# Patient Record
Sex: FEMALE | Race: BLACK OR AFRICAN AMERICAN | Employment: FULL TIME | URBAN - METROPOLITAN AREA
[De-identification: names, ages, dates, MRNs, and addresses within clinical notes are randomized per-mention and may not be internally consistent; named-entity substitution may affect disease eponyms.]

---

## 2017-03-29 ENCOUNTER — TELEPHONE (OUTPATIENT)
Dept: FAMILY MEDICINE CLINIC | Age: 62
End: 2017-03-29

## 2023-03-27 ENCOUNTER — TELEPHONE (OUTPATIENT)
Dept: PRIMARY CARE | Facility: CLINIC | Age: 68
End: 2023-03-27

## 2023-11-28 ENCOUNTER — APPOINTMENT (OUTPATIENT)
Dept: PRIMARY CARE | Facility: CLINIC | Age: 68
End: 2023-11-28
Payer: COMMERCIAL

## 2023-12-30 ENCOUNTER — APPOINTMENT (OUTPATIENT)
Dept: CARDIOLOGY | Facility: HOSPITAL | Age: 68
DRG: 065 | End: 2023-12-30
Payer: MEDICARE

## 2023-12-30 ENCOUNTER — APPOINTMENT (OUTPATIENT)
Dept: RADIOLOGY | Facility: HOSPITAL | Age: 68
DRG: 065 | End: 2023-12-30
Payer: MEDICARE

## 2023-12-30 ENCOUNTER — HOSPITAL ENCOUNTER (INPATIENT)
Facility: HOSPITAL | Age: 68
LOS: 4 days | Discharge: NURSING, REHAB OR LONG-TERM CARE | DRG: 065 | End: 2024-01-03
Attending: STUDENT IN AN ORGANIZED HEALTH CARE EDUCATION/TRAINING PROGRAM | Admitting: INTERNAL MEDICINE
Payer: MEDICARE

## 2023-12-30 DIAGNOSIS — I48.11 LONGSTANDING PERSISTENT ATRIAL FIBRILLATION (MULTI): ICD-10-CM

## 2023-12-30 DIAGNOSIS — I63.9 CEREBROVASCULAR ACCIDENT (CVA), UNSPECIFIED MECHANISM (MULTI): Primary | ICD-10-CM

## 2023-12-30 DIAGNOSIS — I67.89 OTHER CEREBROVASCULAR DISEASE: ICD-10-CM

## 2023-12-30 DIAGNOSIS — G45.8 OTHER SPECIFIED TRANSIENT CEREBRAL ISCHEMIAS: ICD-10-CM

## 2023-12-30 PROBLEM — G62.9 PERIPHERAL NEUROPATHY: Status: ACTIVE | Noted: 2023-12-30

## 2023-12-30 PROBLEM — L89.159 PRESSURE INJURY OF SKIN OF SACRAL REGION: Status: ACTIVE | Noted: 2023-12-30

## 2023-12-30 PROBLEM — E03.9 HYPOTHYROIDISM: Status: ACTIVE | Noted: 2022-12-09

## 2023-12-30 PROBLEM — I10 ESSENTIAL HYPERTENSION: Status: ACTIVE | Noted: 2017-12-21

## 2023-12-30 PROBLEM — F41.9 ANXIETY: Status: ACTIVE | Noted: 2023-12-30

## 2023-12-30 PROBLEM — K21.9 GERD (GASTROESOPHAGEAL REFLUX DISEASE): Status: ACTIVE | Noted: 2023-03-24

## 2023-12-30 LAB
ABO GROUP (TYPE) IN BLOOD: NORMAL
ALBUMIN SERPL BCP-MCNC: 3.6 G/DL (ref 3.4–5)
ALP SERPL-CCNC: 98 U/L (ref 33–136)
ALT SERPL W P-5'-P-CCNC: 28 U/L (ref 7–45)
ANION GAP BLDV CALCULATED.4IONS-SCNC: 9 MMOL/L (ref 10–25)
ANION GAP SERPL CALC-SCNC: 10 MMOL/L (ref 10–20)
ANTIBODY SCREEN: NORMAL
AORTIC VALVE PEAK VELOCITY: 1.57
APTT PPP: 31 SECONDS (ref 27–38)
AST SERPL W P-5'-P-CCNC: 24 U/L (ref 9–39)
AV PEAK GRADIENT: 9.9
BASE EXCESS BLDV CALC-SCNC: 1.7 MMOL/L (ref -2–3)
BASOPHILS # BLD AUTO: 0.05 X10*3/UL (ref 0–0.1)
BASOPHILS NFR BLD AUTO: 0.5 %
BILIRUB DIRECT SERPL-MCNC: 0.2 MG/DL (ref 0–0.3)
BILIRUB SERPL-MCNC: 0.5 MG/DL (ref 0–1.2)
BNP SERPL-MCNC: 302 PG/ML (ref 0–99)
BODY TEMPERATURE: 37 DEGREES CELSIUS
BUN SERPL-MCNC: 26 MG/DL (ref 6–23)
CA-I BLDV-SCNC: 1.17 MMOL/L (ref 1.1–1.33)
CALCIUM SERPL-MCNC: 8.5 MG/DL (ref 8.6–10.3)
CARDIAC TROPONIN I PNL SERPL HS: 23 NG/L (ref 0–13)
CHLORIDE BLDV-SCNC: 103 MMOL/L (ref 98–107)
CHLORIDE SERPL-SCNC: 105 MMOL/L (ref 98–107)
CHOLEST SERPL-MCNC: 153 MG/DL (ref 0–199)
CHOLESTEROL/HDL RATIO: 1.9
CO2 SERPL-SCNC: 26 MMOL/L (ref 21–32)
CREAT SERPL-MCNC: 0.83 MG/DL (ref 0.5–1.05)
EJECTION FRACTION APICAL 4 CHAMBER: 58.2
EJECTION FRACTION: 58
EOSINOPHIL # BLD AUTO: 0.05 X10*3/UL (ref 0–0.7)
EOSINOPHIL NFR BLD AUTO: 0.5 %
ERYTHROCYTE [DISTWIDTH] IN BLOOD BY AUTOMATED COUNT: 16.2 % (ref 11.5–14.5)
EST. AVERAGE GLUCOSE BLD GHB EST-MCNC: 91 MG/DL
FLUAV RNA RESP QL NAA+PROBE: NOT DETECTED
FLUBV RNA RESP QL NAA+PROBE: NOT DETECTED
GFR SERPL CREATININE-BSD FRML MDRD: 76 ML/MIN/1.73M*2
GLUCOSE BLD MANUAL STRIP-MCNC: 139 MG/DL (ref 74–99)
GLUCOSE BLDV-MCNC: 129 MG/DL (ref 74–99)
GLUCOSE SERPL-MCNC: 121 MG/DL (ref 74–99)
HBA1C MFR BLD: 4.8 %
HCO3 BLDV-SCNC: 26.6 MMOL/L (ref 22–26)
HCT VFR BLD AUTO: 34.1 % (ref 36–46)
HCT VFR BLD EST: 36 % (ref 36–46)
HDLC SERPL-MCNC: 79.3 MG/DL
HGB BLD-MCNC: 11.1 G/DL (ref 12–16)
HGB BLDV-MCNC: 12 G/DL (ref 12–16)
IMM GRANULOCYTES # BLD AUTO: 0.03 X10*3/UL (ref 0–0.7)
IMM GRANULOCYTES NFR BLD AUTO: 0.3 % (ref 0–0.9)
INHALED O2 CONCENTRATION: 21 %
INR PPP: 1 (ref 0.9–1.1)
LACTATE BLDV-SCNC: 1.1 MMOL/L (ref 0.4–2)
LACTATE SERPL-SCNC: 1 MMOL/L (ref 0.4–2)
LDLC SERPL CALC-MCNC: 62 MG/DL
LEFT VENTRICLE INTERNAL DIMENSION DIASTOLE: 4.4 (ref 3.5–6)
LYMPHOCYTES # BLD AUTO: 1.38 X10*3/UL (ref 1.2–4.8)
LYMPHOCYTES NFR BLD AUTO: 15.1 %
MCH RBC QN AUTO: 29.1 PG (ref 26–34)
MCHC RBC AUTO-ENTMCNC: 32.6 G/DL (ref 32–36)
MCV RBC AUTO: 89 FL (ref 80–100)
MITRAL VALVE E/A RATIO: 1.25
MITRAL VALVE E/E' RATIO: 6.6
MONOCYTES # BLD AUTO: 0.7 X10*3/UL (ref 0.1–1)
MONOCYTES NFR BLD AUTO: 7.7 %
NEUTROPHILS # BLD AUTO: 6.94 X10*3/UL (ref 1.2–7.7)
NEUTROPHILS NFR BLD AUTO: 75.9 %
NON HDL CHOLESTEROL: 74 MG/DL (ref 0–149)
NRBC BLD-RTO: 0 /100 WBCS (ref 0–0)
OXYHGB MFR BLDV: 36.5 % (ref 45–75)
PCO2 BLDV: 42 MM HG (ref 41–51)
PH BLDV: 7.41 PH (ref 7.33–7.43)
PLATELET # BLD AUTO: 178 X10*3/UL (ref 150–450)
PO2 BLDV: 33 MM HG (ref 35–45)
POTASSIUM BLDV-SCNC: 3.4 MMOL/L (ref 3.5–5.3)
POTASSIUM SERPL-SCNC: 3.3 MMOL/L (ref 3.5–5.3)
PROT SERPL-MCNC: 6 G/DL (ref 6.4–8.2)
PROTHROMBIN TIME: 10.8 SECONDS (ref 9.8–12.8)
RBC # BLD AUTO: 3.82 X10*6/UL (ref 4–5.2)
RH FACTOR (ANTIGEN D): NORMAL
RIGHT VENTRICLE FREE WALL PEAK S': 19.1
RIGHT VENTRICLE PEAK SYSTOLIC PRESSURE: 17.6
SAO2 % BLDV: 38 % (ref 45–75)
SARS-COV-2 RNA RESP QL NAA+PROBE: NOT DETECTED
SODIUM BLDV-SCNC: 135 MMOL/L (ref 136–145)
SODIUM SERPL-SCNC: 138 MMOL/L (ref 136–145)
TRICUSPID ANNULAR PLANE SYSTOLIC EXCURSION: 2.4
TRIGL SERPL-MCNC: 59 MG/DL (ref 0–149)
VLDL: 12 MG/DL (ref 0–40)
WBC # BLD AUTO: 9.2 X10*3/UL (ref 4.4–11.3)

## 2023-12-30 PROCEDURE — 71045 X-RAY EXAM CHEST 1 VIEW: CPT | Mod: FR

## 2023-12-30 PROCEDURE — 84132 ASSAY OF SERUM POTASSIUM: CPT | Performed by: STUDENT IN AN ORGANIZED HEALTH CARE EDUCATION/TRAINING PROGRAM

## 2023-12-30 PROCEDURE — 96374 THER/PROPH/DIAG INJ IV PUSH: CPT | Mod: 59

## 2023-12-30 PROCEDURE — 85025 COMPLETE CBC W/AUTO DIFF WBC: CPT | Performed by: STUDENT IN AN ORGANIZED HEALTH CARE EDUCATION/TRAINING PROGRAM

## 2023-12-30 PROCEDURE — 99223 1ST HOSP IP/OBS HIGH 75: CPT | Performed by: INTERNAL MEDICINE

## 2023-12-30 PROCEDURE — 70450 CT HEAD/BRAIN W/O DYE: CPT

## 2023-12-30 PROCEDURE — 36415 COLL VENOUS BLD VENIPUNCTURE: CPT | Performed by: STUDENT IN AN ORGANIZED HEALTH CARE EDUCATION/TRAINING PROGRAM

## 2023-12-30 PROCEDURE — 93308 TTE F-UP OR LMTD: CPT

## 2023-12-30 PROCEDURE — 2500000004 HC RX 250 GENERAL PHARMACY W/ HCPCS (ALT 636 FOR OP/ED): Performed by: STUDENT IN AN ORGANIZED HEALTH CARE EDUCATION/TRAINING PROGRAM

## 2023-12-30 PROCEDURE — 71045 X-RAY EXAM CHEST 1 VIEW: CPT | Mod: FOREIGN READ | Performed by: RADIOLOGY

## 2023-12-30 PROCEDURE — 83036 HEMOGLOBIN GLYCOSYLATED A1C: CPT | Mod: AHULAB | Performed by: INTERNAL MEDICINE

## 2023-12-30 PROCEDURE — 92523 SPEECH SOUND LANG COMPREHEN: CPT | Mod: GN | Performed by: SPEECH-LANGUAGE PATHOLOGIST

## 2023-12-30 PROCEDURE — 93308 TTE F-UP OR LMTD: CPT | Performed by: INTERNAL MEDICINE

## 2023-12-30 PROCEDURE — 99222 1ST HOSP IP/OBS MODERATE 55: CPT | Performed by: STUDENT IN AN ORGANIZED HEALTH CARE EDUCATION/TRAINING PROGRAM

## 2023-12-30 PROCEDURE — 82248 BILIRUBIN DIRECT: CPT | Performed by: STUDENT IN AN ORGANIZED HEALTH CARE EDUCATION/TRAINING PROGRAM

## 2023-12-30 PROCEDURE — 82947 ASSAY GLUCOSE BLOOD QUANT: CPT

## 2023-12-30 PROCEDURE — 86901 BLOOD TYPING SEROLOGIC RH(D): CPT | Performed by: STUDENT IN AN ORGANIZED HEALTH CARE EDUCATION/TRAINING PROGRAM

## 2023-12-30 PROCEDURE — 85730 THROMBOPLASTIN TIME PARTIAL: CPT | Performed by: STUDENT IN AN ORGANIZED HEALTH CARE EDUCATION/TRAINING PROGRAM

## 2023-12-30 PROCEDURE — 70551 MRI BRAIN STEM W/O DYE: CPT

## 2023-12-30 PROCEDURE — 36415 COLL VENOUS BLD VENIPUNCTURE: CPT | Performed by: INTERNAL MEDICINE

## 2023-12-30 PROCEDURE — 70498 CT ANGIOGRAPHY NECK: CPT

## 2023-12-30 PROCEDURE — 83605 ASSAY OF LACTIC ACID: CPT | Performed by: STUDENT IN AN ORGANIZED HEALTH CARE EDUCATION/TRAINING PROGRAM

## 2023-12-30 PROCEDURE — 92610 EVALUATE SWALLOWING FUNCTION: CPT | Mod: GN | Performed by: SPEECH-LANGUAGE PATHOLOGIST

## 2023-12-30 PROCEDURE — 70551 MRI BRAIN STEM W/O DYE: CPT | Performed by: RADIOLOGY

## 2023-12-30 PROCEDURE — 87636 SARSCOV2 & INF A&B AMP PRB: CPT | Performed by: STUDENT IN AN ORGANIZED HEALTH CARE EDUCATION/TRAINING PROGRAM

## 2023-12-30 PROCEDURE — 96372 THER/PROPH/DIAG INJ SC/IM: CPT | Performed by: INTERNAL MEDICINE

## 2023-12-30 PROCEDURE — 70450 CT HEAD/BRAIN W/O DYE: CPT | Performed by: RADIOLOGY

## 2023-12-30 PROCEDURE — 80061 LIPID PANEL: CPT | Performed by: INTERNAL MEDICINE

## 2023-12-30 PROCEDURE — 2550000001 HC RX 255 CONTRASTS: Performed by: STUDENT IN AN ORGANIZED HEALTH CARE EDUCATION/TRAINING PROGRAM

## 2023-12-30 PROCEDURE — 1200000002 HC GENERAL ROOM WITH TELEMETRY DAILY

## 2023-12-30 PROCEDURE — 2500000004 HC RX 250 GENERAL PHARMACY W/ HCPCS (ALT 636 FOR OP/ED): Performed by: INTERNAL MEDICINE

## 2023-12-30 PROCEDURE — 85610 PROTHROMBIN TIME: CPT | Performed by: STUDENT IN AN ORGANIZED HEALTH CARE EDUCATION/TRAINING PROGRAM

## 2023-12-30 PROCEDURE — 83880 ASSAY OF NATRIURETIC PEPTIDE: CPT | Performed by: INTERNAL MEDICINE

## 2023-12-30 PROCEDURE — 84484 ASSAY OF TROPONIN QUANT: CPT | Performed by: STUDENT IN AN ORGANIZED HEALTH CARE EDUCATION/TRAINING PROGRAM

## 2023-12-30 PROCEDURE — 99291 CRITICAL CARE FIRST HOUR: CPT | Mod: 25 | Performed by: STUDENT IN AN ORGANIZED HEALTH CARE EDUCATION/TRAINING PROGRAM

## 2023-12-30 PROCEDURE — 93005 ELECTROCARDIOGRAM TRACING: CPT

## 2023-12-30 RX ORDER — CARVEDILOL 6.25 MG/1
6.25 TABLET ORAL 2 TIMES DAILY
Status: DISCONTINUED | OUTPATIENT
Start: 2023-12-30 | End: 2024-01-03 | Stop reason: HOSPADM

## 2023-12-30 RX ORDER — HYDRALAZINE HYDROCHLORIDE 20 MG/ML
10 INJECTION INTRAMUSCULAR; INTRAVENOUS ONCE
Status: COMPLETED | OUTPATIENT
Start: 2023-12-30 | End: 2023-12-30

## 2023-12-30 RX ORDER — LABETALOL HYDROCHLORIDE 5 MG/ML
20 INJECTION, SOLUTION INTRAVENOUS ONCE
Status: DISCONTINUED | OUTPATIENT
Start: 2023-12-30 | End: 2023-12-30

## 2023-12-30 RX ORDER — LABETALOL HYDROCHLORIDE 5 MG/ML
10 INJECTION, SOLUTION INTRAVENOUS EVERY 10 MIN PRN
Status: DISCONTINUED | OUTPATIENT
Start: 2023-12-30 | End: 2023-12-30

## 2023-12-30 RX ORDER — HYDRALAZINE HYDROCHLORIDE 25 MG/1
25 TABLET, FILM COATED ORAL EVERY 6 HOURS PRN
Status: DISCONTINUED | OUTPATIENT
Start: 2024-01-01 | End: 2024-01-03 | Stop reason: HOSPADM

## 2023-12-30 RX ORDER — LISINOPRIL 20 MG/1
40 TABLET ORAL DAILY
Status: DISCONTINUED | OUTPATIENT
Start: 2023-12-30 | End: 2024-01-03 | Stop reason: HOSPADM

## 2023-12-30 RX ORDER — PANTOPRAZOLE SODIUM 40 MG/1
40 TABLET, DELAYED RELEASE ORAL
Status: DISCONTINUED | OUTPATIENT
Start: 2023-12-31 | End: 2024-01-03 | Stop reason: HOSPADM

## 2023-12-30 RX ORDER — NAPROXEN SODIUM 220 MG/1
81 TABLET, FILM COATED ORAL DAILY
Status: DISCONTINUED | OUTPATIENT
Start: 2023-12-30 | End: 2024-01-03 | Stop reason: HOSPADM

## 2023-12-30 RX ORDER — ENOXAPARIN SODIUM 100 MG/ML
40 INJECTION SUBCUTANEOUS EVERY 24 HOURS
Status: DISCONTINUED | OUTPATIENT
Start: 2023-12-30 | End: 2024-01-03 | Stop reason: HOSPADM

## 2023-12-30 RX ORDER — POLYETHYLENE GLYCOL 3350 17 G/17G
17 POWDER, FOR SOLUTION ORAL DAILY
Status: DISCONTINUED | OUTPATIENT
Start: 2023-12-30 | End: 2024-01-03 | Stop reason: HOSPADM

## 2023-12-30 RX ORDER — ATORVASTATIN CALCIUM 80 MG/1
80 TABLET, FILM COATED ORAL NIGHTLY
Status: DISCONTINUED | OUTPATIENT
Start: 2023-12-30 | End: 2024-01-03 | Stop reason: HOSPADM

## 2023-12-30 RX ORDER — HYDRALAZINE HYDROCHLORIDE 20 MG/ML
10 INJECTION INTRAMUSCULAR; INTRAVENOUS
Status: DISPENSED | OUTPATIENT
Start: 2023-12-30 | End: 2024-01-01

## 2023-12-30 RX ORDER — LEVOTHYROXINE SODIUM 50 UG/1
25 TABLET ORAL DAILY
Status: DISCONTINUED | OUTPATIENT
Start: 2023-12-31 | End: 2024-01-03 | Stop reason: HOSPADM

## 2023-12-30 RX ORDER — METOPROLOL TARTRATE 1 MG/ML
5 INJECTION, SOLUTION INTRAVENOUS EVERY 6 HOURS PRN
Status: DISCONTINUED | OUTPATIENT
Start: 2023-12-30 | End: 2024-01-03 | Stop reason: HOSPADM

## 2023-12-30 RX ADMIN — HYDRALAZINE HYDROCHLORIDE 10 MG: 20 INJECTION INTRAMUSCULAR; INTRAVENOUS at 14:23

## 2023-12-30 RX ADMIN — HYDRALAZINE HYDROCHLORIDE 10 MG: 20 INJECTION INTRAMUSCULAR; INTRAVENOUS at 11:38

## 2023-12-30 RX ADMIN — ENOXAPARIN SODIUM 40 MG: 40 INJECTION SUBCUTANEOUS at 15:35

## 2023-12-30 RX ADMIN — LABETALOL HYDROCHLORIDE 10 MG: 5 INJECTION, SOLUTION INTRAVENOUS at 12:32

## 2023-12-30 RX ADMIN — IOHEXOL 90 ML: 350 INJECTION, SOLUTION INTRAVENOUS at 10:36

## 2023-12-30 SDOH — SOCIAL STABILITY: SOCIAL INSECURITY: WERE YOU ABLE TO COMPLETE ALL THE BEHAVIORAL HEALTH SCREENINGS?: NO

## 2023-12-30 ASSESSMENT — ACTIVITIES OF DAILY LIVING (ADL)
JUDGMENT_ADEQUATE_SAFELY_COMPLETE_DAILY_ACTIVITIES: UNABLE TO ASSESS
PATIENT'S MEMORY ADEQUATE TO SAFELY COMPLETE DAILY ACTIVITIES?: UNABLE TO ASSESS
HEARING - RIGHT EAR: UNABLE TO ASSESS
ADEQUATE_TO_COMPLETE_ADL: UNABLE TO ASSESS
FEEDING YOURSELF: DEPENDENT
HEARING - LEFT EAR: UNABLE TO ASSESS
BATHING: DEPENDENT
WALKS IN HOME: DEPENDENT
DRESSING YOURSELF: DEPENDENT
GROOMING: DEPENDENT
TOILETING: DEPENDENT

## 2023-12-30 ASSESSMENT — COGNITIVE AND FUNCTIONAL STATUS - GENERAL
MOVING TO AND FROM BED TO CHAIR: TOTAL
CLIMB 3 TO 5 STEPS WITH RAILING: TOTAL
TURNING FROM BACK TO SIDE WHILE IN FLAT BAD: TOTAL
DRESSING REGULAR UPPER BODY CLOTHING: TOTAL
STANDING UP FROM CHAIR USING ARMS: TOTAL
MOVING FROM LYING ON BACK TO SITTING ON SIDE OF FLAT BED WITH BEDRAILS: TOTAL
MOVING TO AND FROM BED TO CHAIR: TOTAL
PERSONAL GROOMING: TOTAL
HELP NEEDED FOR BATHING: TOTAL
TOILETING: TOTAL
EATING MEALS: TOTAL
DAILY ACTIVITIY SCORE: 6
DRESSING REGULAR LOWER BODY CLOTHING: TOTAL
DRESSING REGULAR LOWER BODY CLOTHING: TOTAL
WALKING IN HOSPITAL ROOM: TOTAL
TURNING FROM BACK TO SIDE WHILE IN FLAT BAD: A LOT
HELP NEEDED FOR BATHING: TOTAL
DAILY ACTIVITIY SCORE: 6
MOVING FROM LYING ON BACK TO SITTING ON SIDE OF FLAT BED WITH BEDRAILS: TOTAL
MOBILITY SCORE: 6
STANDING UP FROM CHAIR USING ARMS: TOTAL
TURNING FROM BACK TO SIDE WHILE IN FLAT BAD: TOTAL
DAILY ACTIVITIY SCORE: 6
PATIENT BASELINE BEDBOUND: UNABLE TO ASSESS AT THIS TIME
DRESSING REGULAR UPPER BODY CLOTHING: TOTAL
PERSONAL GROOMING: TOTAL
STANDING UP FROM CHAIR USING ARMS: TOTAL
PERSONAL GROOMING: TOTAL
PERSONAL GROOMING: TOTAL
TOILETING: TOTAL
EATING MEALS: TOTAL
MOBILITY SCORE: 6
DRESSING REGULAR LOWER BODY CLOTHING: TOTAL
STANDING UP FROM CHAIR USING ARMS: TOTAL
EATING MEALS: TOTAL
MOVING FROM LYING ON BACK TO SITTING ON SIDE OF FLAT BED WITH BEDRAILS: TOTAL
TOILETING: TOTAL
WALKING IN HOSPITAL ROOM: TOTAL
CLIMB 3 TO 5 STEPS WITH RAILING: TOTAL
MOVING TO AND FROM BED TO CHAIR: TOTAL
MOBILITY SCORE: 8
DRESSING REGULAR LOWER BODY CLOTHING: TOTAL
CLIMB 3 TO 5 STEPS WITH RAILING: TOTAL
DAILY ACTIVITIY SCORE: 6
MOBILITY SCORE: 6
MOVING FROM LYING ON BACK TO SITTING ON SIDE OF FLAT BED WITH BEDRAILS: A LOT
WALKING IN HOSPITAL ROOM: TOTAL
DRESSING REGULAR UPPER BODY CLOTHING: TOTAL
WALKING IN HOSPITAL ROOM: TOTAL
CLIMB 3 TO 5 STEPS WITH RAILING: TOTAL
HELP NEEDED FOR BATHING: TOTAL
EATING MEALS: TOTAL
TOILETING: TOTAL
HELP NEEDED FOR BATHING: TOTAL
TURNING FROM BACK TO SIDE WHILE IN FLAT BAD: TOTAL
MOVING TO AND FROM BED TO CHAIR: TOTAL
DRESSING REGULAR UPPER BODY CLOTHING: TOTAL

## 2023-12-30 ASSESSMENT — PAIN SCALES - GENERAL: PAINLEVEL_OUTOF10: 0 - NO PAIN

## 2023-12-30 ASSESSMENT — PAIN - FUNCTIONAL ASSESSMENT: PAIN_FUNCTIONAL_ASSESSMENT: 0-10

## 2023-12-30 NOTE — ED PROVIDER NOTES
History/Exam limitations: communication barrier aphasia .   Additional history was obtained from EMS personnel.    HPI:       68-year-old female past medical history of cerebrovascular accident presenting to the emergency department as a stroke alert.  History is provided by emergency medical services as the patient is aphasic and not able to provide any history.  Per patient's family, at 10 PM last night the patient went to bed normal.  She woke up this morning and was unresponsive, prompting EMS to be called.  When EMS arrived, they noted she had a normal blood glucose, but had left-sided weakness and was aphasic therefore brought in as a stroke alert.  Patient aphasic on arrival so unable to provide any history.  Per family, patient has been in her usual state of health prior to today.     Last Known Well Time: 12/29 22:00        ------------------------------------------------------------------------------------------------------------------------------------------     Physical Exam:    ED Triage Vitals   Temp Pulse Resp BP   -- -- -- --      SpO2 Temp src Heart Rate Source Patient Position   -- -- -- --      BP Location FiO2 (%)     -- --          Gen: Sitting in stretcher with mouth open and vomitus on shirt  Head/Neck: NCAT  Eyes: Anicteric sclerae, noninjected conjunctivae  Nose: No rhinorrhea  Mouth:  dry mm. No op lesions  Heart: Regular rate and irregular rhythm w/ no murmurs, rubs, gallops  Lungs: CTAB w/o wheezes, rales, rhonchi, no increased work of breathing  Abdomen: Soft, NT/ND  Musculoskeletal: No deformities  Extremities: No edema.  Neurologic: Please see below for NIHSS  Skin: No rashes noted  Psychological: Calm        Interval: Baseline  Time: 10:25 AM  Person Administering Scale: Boy Mccain MD     1a  Level of consciousness: 1=not alert but arousable by minor stimulation to obey, answer or respond   1b. LOC questions:  2=Performs neither task correctly   1c. LOC commands: 0=Performs both  tasks correctly   2.  Best Gaze: 2=forced deviation, or total gaze paresis not overcome by oculocephalic maneuver   3. Visual: 0=No visual loss   4. Facial Palsy: 1=Minor paralysis (flattened nasolabial fold, asymmetric on smiling)   5a. Motor left arm: 0=No drift, limb holds 90 (or 45) degrees for full 10 seconds   5b.  Motor right arm: 0=No drift, limb holds 90 (or 45) degrees for full 10 seconds   6a. motor left le=No drift, limb holds 90 (or 45) degrees for full 10 seconds   6b  Motor right le=No drift, limb holds 90 (or 45) degrees for full 10 seconds   7. Limb Ataxia: 0=Absent   8.  Sensory: 0=Normal; no sensory loss   9. Best Language:  3=Mute, global aphasia; no usable speech or auditory comprehension   10. Dysarthria: 2=Severe; patient speech is so slurred as to be unintelligible in the absence of or our of proportion to any dysphagia, or is mute/anarthric   11. Extinction and Inattention: 0=No abnormality     Total:   11         CARMELITA: CARMELITA: Positive     ------------------------------------------------------------------------------------------------------------------------------------------     Medical Decision Makin-year-old female with past medical history of CVA presents to the emergency department as a stroke alert.  Patient has been having decreased dexterity in her hands, and slight decline over the past few days.  Son found her today at home unresponsive.  She arrives as a stroke alert.  NIH initially 11 with aphasia, left gaze deviation that cannot be overcome.  However, she is protecting her airway, following commands.  Point-of-care glucose obtained on initial arrival which was normal.  IV access established, and patient taken to CT and CT angiogram due to being VAN positive.  CT head interpreted by me demonstrates no evidence of intracranial hemorrhage, and CT angiogram negative for evidence of large vessel occlusion therefore the patient was not a thrombectomy candidate.  In  discussion with radiologist, patient CT head demonstrates subacute frontoparietal infarct which is likely the cause of her symptoms at this time.  Metabolic workup otherwise unremarkable for infectious, or metabolic derangement precipitating her altered mental status.  MRI will be ordered, and patient will be admitted to the hospitalist service.  Systolic blood pressure did elevate above 220 once, so she received 1 dose of IV hydralazine.    Diagnoses as of 12/30/23 1212   Cerebrovascular accident (CVA), unspecified mechanism (CMS/HCC)        EKG interpreted by myself: Regular ventricular rate, regular rhythm, normal axis. NY, QRS, and QTC intervals within normal limits. No acute ST segment changes or T wave inversions.     Impression: Normal sinus rhythm with no acute ischemia or arrhythmia, no previous EKG seen for comparison.        Independent Interpretation of Studies: I independently interpreted the CT head and see No obvious evidence of intracranial hemorrhage    Chronic Medical Conditions Significantly Affecting Care: CVA hx    Social Determinants of Health Significantly Affecting Care: NA         IV Thrombolysis IV Thrombolysis Checklist        IV Thrombolysis Given: No; Thrombolysis contraindication reason: Time from Last Known Well (or stroke onset) is >4.5 hours         Procedure  Procedures          Boy Mccain MD  12/30/23 1214

## 2023-12-30 NOTE — PROGRESS NOTES
SLP ADULT Inpatient Speech-Language Evaluation    Patient Name: Zo Nunez  MRN: [id number]  : 1955  Today's Date: 23   Time Calculation  Start Time: 1415  Stop Time: 1440  Time Calculation (min): 25 min       Diagnosis/Recommendations: SEVERE mixed aphasia   -use model and max cues for simple 1 step commands, hand over hand assist as needed         General Information:  Patient Admitted: Stroke alert - MRI positive  Reason for Referral: Concern for aspiration, aphasic  Referred By: RN  Past Medical History Relevant to Rehab: AFib, HTN, alcoholism    Oral Peripheral Exam:    -OM: Right sided facial weakness with droop   -Dentition: Functional    -Voice/Phonation/Intensity: unable to assess, absent   -DDK: unable to assess, absent   -Intelligibility: unable to assess, non-verbal    Auditory Comprehension:   Pt  followed simple commands with hand over hand/tactile assist, otherwise unable to follow commands for OME at time of eval.    Verbal Expression:   Seemingly restless and frustated, no attempt at verbalizations or vocalizations at time of eval.  Unresponsive to simple yes/no questions, though appeared to possibly shake head minimally 'no' when asked if her name was Caroal.      Visual:  Pt with eye gaze deviation to left, unable to scan to or past midline at time of evaluation, despite SLP movement in room.  Inattention to right side at this time.  Multiple times, eyes rolled back and up to left.  Able to relax back, but consistently fixated to left.  Occasional some bilateral hand jerking, but unable to assess whether related to frustration/restlessness/neurological etiology.  MD aware    Plan:     SLP Frequency: 3x per week  Duration: Current admission  SLP Discharge Recommendations: Continue skilled speech therapy services post discharge  Discussed POC: Patient, Caregiver/family, Nursing, Physician  Discussed Risks/Benefits: Yes, Patient, Caregiver/Family, Nursing,  Physician  Patient/Caregiver Agreeable: Yes    Goals: 1.Pt will complete basic level expressive language tasks with max/model assist.  2.Pt will complete basic level receptive language tasks with max/total assist      Treatment Provided:        Inpatient Education:   Patient/family educated on role of SLP   Family education results gave verbal understanding

## 2023-12-30 NOTE — ED PROCEDURE NOTE
Procedure  Critical Care    Performed by: Boy Mccain MD  Authorized by: Boy Mccain MD    Critical care provider statement:     Critical care time (minutes):  40    Critical care time was exclusive of:  Separately billable procedures and treating other patients and teaching time    Critical care was necessary to treat or prevent imminent or life-threatening deterioration of the following conditions:  CNS failure or compromise    Critical care was time spent personally by me on the following activities:  Development of treatment plan with patient or surrogate, discussions with consultants, evaluation of patient's response to treatment, examination of patient, obtaining history from patient or surrogate, review of old charts, re-evaluation of patient's condition, ordering and review of radiographic studies, ordering and review of laboratory studies and ordering and performing treatments and interventions               Boy Mccain MD  12/30/23 5704

## 2023-12-30 NOTE — PROGRESS NOTES
Speech-Language Pathology    Inpatient Speech-Language Pathology Clinical Swallow Evaluation    Patient Name: Zo Nunez  MRN: [id number]  : 1955  Today's Date: 23  Time Calculation  Start Time: 1415  Stop Time: 1440  Time Calculation (min): 25 min       Recommendations:  NPO with consideration for alternative means nutrition/hydration.  - Frequent, aggressive oral care is strongly recommended to improve infection control to maintain dental health and reduce bacteria in oral/pharyngeal cavity which could increase the risk of pneumonia.  -continued SLP assessment for PO readiness    General Info:  Reason for Referral: Concern for aspiration, aphasic  Past Medical History Relevant to Rehab: AFib, HTN, alcoholism  Prior Level of Function: WFL  BaseLine Diet: Regular  Dysphagia Diagnosis: Severe oral stage dysphagia, Severe pharyngeal stage dysphagia    MRI:  Multiple areas acute ischemia are scattered through the left and to a  lesser degree right frontal lobe    Assessment:  Clinical swallow evaluation completed.  Significantly limited by pt sustained attention/arousal and mixed aphasia.  Pt able to follow minimal commands, though improved with tactile/max cues.  No verbalizations or vocalizations noted at time of assessment, though family did report some moaning.  rided sided facial weakness noted with droop, resulting in drooling and anterior loss of small bolus of mildly thick liquid with spoon.  DIL and son at bedside report frequent coughing/choking on own saliva in ED.   No further trials provided 2/2 lethargy and concern for high risk aspiration.  Once alert and participating, recommend reassessment of swallowing or need for MBSS.    Diet Recommendations: NPO with consideration for alternative means nutrition/hydration.             Plan:  Treatment/Interventions: Bolus trials, Assess diet tolerance, Diet recommendations, Complete MBSS  SLP Frequency: 3x per week  SLP Discharge Recommendations:  Continue skilled speech therapy services post discharge      GOALS:  Pt. to tolerate least restrictive diet without pulmonary compromise    Education:   Pt. Educated on purpose of assessment, recommended diet and recommended safe swallow strategies

## 2023-12-30 NOTE — CONSULTS
Subjective   History Of Present Illness  Zo Nunez is a 68 y.o. female admitted on 12/30/2023 and past medical history including atrial fibrillation not on anticoagulation admitted for aphasia and right-sided weakness with last known well at approximately 2200 on 12/29.  Prior to admission, she has noted a headache for the last 2 days in the morning.  Even prior to last night, she was having some concern of right-sided facial droop.    On the morning of 12/30, patient was found lying in feces and unable to communicate with left gaze deviation.    Patient's son notes poor medication adherence.    Review of systems as noted above, otherwise negative.    Past Medical History  Past Medical History:   Diagnosis Date    Acute upper respiratory infection, unspecified     Acute upper respiratory infection    Alcohol dependence, in remission (CMS/[facility]) 12/26/2019    History of alcoholism    Alcohol dependence, in remission (CMS/[facility]) 12/26/2019    History of alcohol dependence    Constipation, unspecified 12/26/2019    Constipation in female    Essential (primary) hypertension 12/26/2019    HTN (hypertension), benign    Other malaise 12/26/2019    Physical deconditioning    Personal history of other diseases of the circulatory system 12/26/2019    History of atrial fibrillation    Personal history of other diseases of the musculoskeletal system and connective tissue 12/26/2019    History of rhabdomyolysis    Personal history of other specified conditions 12/26/2019    History of weakness    Pressure ulcer of sacral region, stage 2 (CMS/[facility]) 12/26/2019    Pressure injury of sacral region, stage 2    Trichomonal vulvovaginitis     Trichomonal vulvovaginitis     Surgical History  Past Surgical History:   Procedure Laterality Date    OTHER SURGICAL HISTORY  12/26/2019    Decubitus ulcer debridement     Social History  Social History     Tobacco Use    Smoking status: Unknown   Substance Use Topics    Alcohol use: Defer    Drug  use: Defer     Allergies  Reviewed in EMR       Objective   Last Recorded Vitals  Blood pressure (!) 221/104, pulse 90, temperature 37 °C (98.6 °F), temperature source Temporal, resp. rate 20, weight 76.2 kg (167 lb 15.9 oz), SpO2 97 %.    Scheduled medications  aspirin, 81 mg, oral, Daily  atorvastatin, 80 mg, oral, Nightly  carvedilol, 6.25 mg, oral, BID  enoxaparin, 40 mg, subcutaneous, q24h  levothyroxine, 25 mcg, oral, Daily  lisinopril, 40 mg, oral, Daily  [START ON 12/31/2023] pantoprazole, 40 mg, oral, Daily before breakfast  perflutren lipid microspheres, 0.5-10 mL of dilution, intravenous, Once in imaging  perflutren protein A microsphere, 0.5 mL, intravenous, Once in imaging  polyethylene glycol, 17 g, oral, Daily  sulfur hexafluoride microsphr, 2 mL, intravenous, Once in imaging      Continuous medications     PRN medications  PRN medications: hydrALAZINE **FOLLOWED BY** [START ON 1/1/2024] hydrALAZINE, metoprolol, oxygen, sodium chloride         Exam   Neurological Exam  Mental Status  Awake and alert. Attention and concentration are normal.  Patient is otherwise mute, does not attempt to verbalize.  There is some head-nodding to questions and moderate ability to follow commands; this is felt to represent a mixed aphasia with expressive greater than receptive deficits..    Cranial Nerves  CN II: Patient blinks to threat more from the right than the left.  CN III, IV, VI: Gaze is conjugate with left deviation that does not cross midline.  Patient will briefly track to midline and then turn her head.  Pupils are equal, round, and react.  CN V: Difficult to assess based on level of cooperation, reacts bilaterally.  CN VII:  Right: There is central facial weakness.  Left: There is no facial weakness.  CN VIII: Hearing is normal.  CN IX, X: Does not cooperate.  CN XI: Shoulder shrug strength is normal.  CN XII: Tongue midline without atrophy or fasciculations.    Motor  Normal muscle bulk throughout. Normal  muscle tone. Right pronator drift and left pronator drift.  There is some drift of the bilateral upper extremities, more prominent on the right than the left, though both remain antigravity for duration of NIH testing.  Handgrip shows slight asymmetry, decreased on the right when compared to the left.  Bilateral lower extremities display good resistance..    Sensory  Patient reacts to tactile stimuli bilaterally, felt to be symmetric.    Reflexes                                            Right                      Left  Brachioradialis                    2+                         2+  Biceps                                 2+                         2+  Patellar                                1+                         1+    Coordination    Does not cooperate.    Physical Exam  Constitutional:       General: She is awake.   Neurological:      Mental Status: She is alert.      Deep Tendon Reflexes:      Reflex Scores:       Bicep reflexes are 2+ on the right side and 2+ on the left side.       Brachioradialis reflexes are 2+ on the right side and 2+ on the left side.       Patellar reflexes are 1+ on the right side and 1+ on the left side.        1a. LOC  1b. Month and Age  1c. Blink and Make fist  2 Best Gaze  3. Visual  4. Facial Palsy  5A. Motor - L Arm  5B. Motor - R Arm  6A. Motor - L Leg  6B. Motor - R Leg  7. Limb Ataxia  8. Sensory Loss  9. Best Language  10. Dysarthria  11. Extinction and Inattention   Total NIHSS 0  2  1  2  2  2  1  1  0  0  0  0  3  2  0  16             Relevant Results    Lab Results  Results from last 72 hours   Lab Units 12/30/23  1034   GLUCOSE mg/dL 121*   SODIUM mmol/L 138   POTASSIUM mmol/L 3.3*   CHLORIDE mmol/L 105   CO2 mmol/L 26   ANION GAP mmol/L 10   BUN mg/dL 26*   CREATININE mg/dL 0.83   EGFR mL/min/1.73m*2 76   CALCIUM mg/dL 8.5*   ALBUMIN g/dL 3.6      Results from last 72 hours   Lab Units 12/30/23  1034   WBC AUTO x10*3/uL 9.2   NRBC AUTO /100 WBCs 0.0   RBC AUTO  x10*6/uL 3.82*   HEMOGLOBIN g/dL 11.1*   HEMATOCRIT % 34.1*   MCV fL 89   MCH pg 29.1   MCHC g/dL 32.6   RDW % 16.2*   PLATELETS AUTO x10*3/uL 178        MR brain wo IV contrast   Final Result   Multiple areas acute ischemia are scattered through the left and to a   lesser degree right frontal lobe as noted above.        The head of the right caudate nucleus in the posterior aspect of the   right corona radiata have punctate old hemosiderin/mineral   depositions. No acute hemorrhage is noted.        The parenchymal hyperintensities elsewhere are nonspecific and may be   secondary to chronic microvascular ischemic change.        MACRO:   None        Signed by: Vishnu Husain 12/30/2023 2:04 PM   Dictation workstation:   [id number]      XR chest 1 view   Final Result   No acute cardiopulmonary disease.  No significant interval change from   the prior exam..   Signed by Larry James MD      CT angio brain attack neck w IV contrast and post procedure   Final Result   * Minor atherosclerotic changes in the cavernous and supraclinoid   portion of the internal carotid arteries. Without measurable luminal   narrowing. No evidence of intracranial aneurysm, vascular   malformation or branch occlusion.        MACRO:   none        Signed by: Sudeep Livingston 12/30/2023 10:44 AM   Dictation workstation:   [id number]      CT brain attack head wo IV contrast   Final Result   1. Subacute nonhemorrhagic infarct left posterior frontal parietal   region not seen on 03/22/2023.   2. Remote left thalamic infarct.        MACRO:   Arlin Washington discussed the significance and urgency of this   critical finding by telephone with  Dr. Velasquez on 12/30/2023 at   10:13 am.  (**-RCF-**) Findings:  See findings.        Signed by: Arlin Washington 12/30/2023 10:18 AM   Dictation workstation:   [id number]      Transthoracic Echo (TTE) Limited    (Results Pending)         Assessment/Plan     #Acute ischemic stroke, multifocal, embolic    Type:  Ischemic stroke  Subtype/etiology: Suspected cardioembolic  Vessels involved: Bilateral MCA branches  Neurological manifestations:  NIHSS (worst at presentation): 16   Diagnostic evaluation: CTA, MRI  Antiplatelet/antithrombotic plan for stroke prevention: Patient will need anticoagulation given A-fib.  From stroke perspective, okay to initiate 1/6/2024.  Continue daily aspirin until then, unless indicated for other systemic etiology  VTE prophylaxis: No contraindications from stroke perspective, per primary  Vascular Risk Factor modification goals:  Blood pressure goals: avoid hypotension SBP <100 that could worsen cerebral perfusion, Ischemic stroke- early permissive hypertension SBP < 220 mmHg with cautious inpatient lowering  Lipid Goals: education on healthy diet and statin therapy to maintain or achieve goal LDL-cholesterol < 70mg  Glucose Goals: early treatment of hyperglycemia to goal glucose 140-180 mg/dl with long-term goal A1c < 7%   Smoking Cessation and Education  Assessment for Rehabilitation needs   Patient and family education on signs and symptoms of stroke, calling 911, healthy strategies for stroke prevention.               I spent 60 minutes in the professional and overall care of this patient.      Lexx Lee MD

## 2023-12-30 NOTE — H&P
History Of Present Illness  Zo Nunez is a 68 y.o. female presenting with aphasia and right-sided weakness.  History is obtained from son at the bedside.  According to son Bhavesh patient noted some head hurting for roughly 2 days prior to this morning.  He also noted a right-sided facial droop.  He states when he saw her last night she was still communicating but continued to have right-sided facial droop and headache.  He went to check on her this morning and found her lying in her feces unable to communicate.  At that point he called EMS and had her brought into the hospital.  Here in the hospital she does have a right-sided facial droop as well as right-sided upper and lower extremity weakness.  She also has aphasia and is unable to communicate.  She does follow commands fairly well.  She is also found to be severely hypertensive.  According to patient's son he does not believe she has been taking any of her medications.     Past Medical History  Past Medical History:   Diagnosis Date    Acute upper respiratory infection, unspecified     Acute upper respiratory infection    Alcohol dependence, in remission (CMS/[facility]) 12/26/2019    History of alcoholism    Alcohol dependence, in remission (CMS/[facility]) 12/26/2019    History of alcohol dependence    Constipation, unspecified 12/26/2019    Constipation in female    Essential (primary) hypertension 12/26/2019    HTN (hypertension), benign    Other malaise 12/26/2019    Physical deconditioning    Personal history of other diseases of the circulatory system 12/26/2019    History of atrial fibrillation    Personal history of other diseases of the musculoskeletal system and connective tissue 12/26/2019    History of rhabdomyolysis    Personal history of other specified conditions 12/26/2019    History of weakness    Pressure ulcer of sacral region, stage 2 (CMS/[facility]) 12/26/2019    Pressure injury of sacral region, stage 2    Trichomonal vulvovaginitis     Trichomonal  vulvovaginitis       Surgical History  Past Surgical History:   Procedure Laterality Date    OTHER SURGICAL HISTORY  12/26/2019    Decubitus ulcer debridement        Social History  Social History     Socioeconomic History    Marital status: Single     Spouse name: None    Number of children: None    Years of education: None    Highest education level: None   Occupational History    None   Tobacco Use    Smoking status: Unknown    Smokeless tobacco: None   Substance and Sexual Activity    Alcohol use: Defer    Drug use: Defer    Sexual activity: None   Other Topics Concern    None   Social History Narrative    None     Social Determinants of Health     Financial Resource Strain: Not on file   Food Insecurity: Not on file   Transportation Needs: Not on file   Physical Activity: Not on file   Stress: Not on file   Social Connections: Not on file   Intimate Partner Violence: Not on file   Housing Stability: Not on file       Family History  No family history on file.     Allergies  Patient has no allergy information on record.    Medications prior to admission  No current facility-administered medications on file prior to encounter.     Current Outpatient Medications on File Prior to Encounter   Medication Sig Dispense Refill    albuterol 90 mcg/actuation inhaler Inhale every 6 hours if needed.      amLODIPine (Norvasc) 10 mg tablet Take 1 tablet (10 mg) by mouth once daily.      aspirin 81 mg chewable tablet Chew.      carvedilol (Coreg) 25 mg tablet Take 1 tablet (25 mg) by mouth 2 times a day.      carvedilol (Coreg) 6.25 mg tablet Take 1 tablet (6.25 mg) by mouth 2 times a day.      cyanocobalamin (Vitamin B-12) 1,000 mcg/mL injection Inject 1 mL (1,000 mcg) into the shoulder, thigh, or buttocks every 30 (thirty) days.      cyclobenzaprine (Flexeril) 5 mg tablet TAKE 1 TABLET BY MOUTH ONE TO TWO TIMES DAILY AS NEEDED      diphenhydrAMINE (Benadryl Allergy) 25 mg tablet Take by mouth every 4 hours.      doxazosin  (Cardura) 2 mg tablet Take 1 tablet (2 mg) by mouth once daily.      enalapril (Vasotec) 20 mg tablet Take 1 tablet (20 mg) by mouth 2 times a day.      ergocalciferol (Vitamin D-2) 1.25 MG (22966 UT) capsule Take by mouth.      fluticasone (Flonase) 50 mcg/actuation nasal spray Administer into affected nostril(s).      guaiFENesin (Humibid 3) 400 mg tablet Take by mouth every 4 hours if needed.      levothyroxine (Synthroid, Levoxyl) 25 mcg tablet Take 1 tablet (25 mcg) by mouth once daily.      lisinopril 40 mg tablet Take 1 tablet (40 mg) by mouth once daily.      metoprolol succinate XL (Toprol-XL) 50 mg 24 hr tablet Take 1 tablet (50 mg) by mouth once daily.      montelukast (Singulair) 10 mg tablet Take 1 tablet (10 mg) by mouth once daily at bedtime.      NIFEdipine CC 30 mg 24 hr tablet Take 1 tablet (30 mg) by mouth once daily.      omega-3 fatty acids-fish oil (Fish OiL) 340-1,000 mg capsule Take by mouth.      omeprazole (PriLOSEC) 40 mg DR capsule Take 1 capsule (40 mg) by mouth once daily in the morning. Take before meals.      sodium chloride (Ocean) 0.65 % nasal spray Administer into affected nostril(s) 4 times a day as needed.      spironolacton-hydrochlorothiaz (Aldactazide) 25-25 mg tablet Take 1 tablet (25 mg) by mouth once daily.         Review of Systems   Unable to perform ROS: Patient nonverbal        Vital Signs  BP (!) 179/123   Pulse 92   Resp 18   Wt 76.2 kg (167 lb 15.9 oz)   SpO2 97%     Physical Exam  Vitals and nursing note reviewed.   Constitutional:       General: She is not in acute distress.     Appearance: She is normal weight.   HENT:      Head: Normocephalic and atraumatic.   Eyes:      Extraocular Movements:      Right eye: Abnormal extraocular motion present.   Cardiovascular:      Rate and Rhythm: Tachycardia present. Rhythm irregular.      Heart sounds: Normal heart sounds.   Pulmonary:      Effort: Pulmonary effort is normal. No respiratory distress.      Breath sounds:  Normal breath sounds. No wheezing.   Abdominal:      General: Abdomen is flat. Bowel sounds are normal. There is no distension.      Palpations: Abdomen is soft.   Musculoskeletal:         General: Normal range of motion.   Skin:     General: Skin is warm and dry.      Capillary Refill: Capillary refill takes less than 2 seconds.   Neurological:      Mental Status: She is alert.      Cranial Nerves: Facial asymmetry present.      Motor: Weakness (R sided) present.                Relevant Results  Results for orders placed or performed during the hospital encounter of 12/30/23 (from the past 24 hour(s))   POCT GLUCOSE   Result Value Ref Range    POCT Glucose 139 (H) 74 - 99 mg/dL   CBC and Auto Differential   Result Value Ref Range    WBC 9.2 4.4 - 11.3 x10*3/uL    nRBC 0.0 0.0 - 0.0 /100 WBCs    RBC 3.82 (L) 4.00 - 5.20 x10*6/uL    Hemoglobin 11.1 (L) 12.0 - 16.0 g/dL    Hematocrit 34.1 (L) 36.0 - 46.0 %    MCV 89 80 - 100 fL    MCH 29.1 26.0 - 34.0 pg    MCHC 32.6 32.0 - 36.0 g/dL    RDW 16.2 (H) 11.5 - 14.5 %    Platelets 178 150 - 450 x10*3/uL    Neutrophils % 75.9 40.0 - 80.0 %    Immature Granulocytes %, Automated 0.3 0.0 - 0.9 %    Lymphocytes % 15.1 13.0 - 44.0 %    Monocytes % 7.7 2.0 - 10.0 %    Eosinophils % 0.5 0.0 - 6.0 %    Basophils % 0.5 0.0 - 2.0 %    Neutrophils Absolute 6.94 1.20 - 7.70 x10*3/uL    Immature Granulocytes Absolute, Automated 0.03 0.00 - 0.70 x10*3/uL    Lymphocytes Absolute 1.38 1.20 - 4.80 x10*3/uL    Monocytes Absolute 0.70 0.10 - 1.00 x10*3/uL    Eosinophils Absolute 0.05 0.00 - 0.70 x10*3/uL    Basophils Absolute 0.05 0.00 - 0.10 x10*3/uL   Comprehensive metabolic panel   Result Value Ref Range    Glucose 121 (H) 74 - 99 mg/dL    Sodium 138 136 - 145 mmol/L    Potassium 3.3 (L) 3.5 - 5.3 mmol/L    Chloride 105 98 - 107 mmol/L    Bicarbonate 26 21 - 32 mmol/L    Anion Gap 10 10 - 20 mmol/L    Urea Nitrogen 26 (H) 6 - 23 mg/dL    Creatinine 0.83 0.50 - 1.05 mg/dL    eGFR 76 >60  mL/min/1.73m*2    Calcium 8.5 (L) 8.6 - 10.3 mg/dL    Albumin 3.6 3.4 - 5.0 g/dL    Alkaline Phosphatase 98 33 - 136 U/L    Total Protein 6.0 (L) 6.4 - 8.2 g/dL    AST 24 9 - 39 U/L    Bilirubin, Total 0.5 0.0 - 1.2 mg/dL    ALT 28 7 - 45 U/L   Troponin I, High Sensitivity   Result Value Ref Range    Troponin I, High Sensitivity 23 (H) 0 - 13 ng/L   Protime-INR   Result Value Ref Range    Protime 10.8 9.8 - 12.8 seconds    INR 1.0 0.9 - 1.1   APTT   Result Value Ref Range    aPTT 31 27 - 38 seconds   Lactate   Result Value Ref Range    Lactate 1.0 0.4 - 2.0 mmol/L   Blood Gas Venous Full Panel   Result Value Ref Range    POCT pH, Venous 7.41 7.33 - 7.43 pH    POCT pCO2, Venous 42 41 - 51 mm Hg    POCT pO2, Venous 33 (L) 35 - 45 mm Hg    POCT SO2, Venous 38 (L) 45 - 75 %    POCT Oxy Hemoglobin, Venous 36.5 (L) 45.0 - 75.0 %    POCT Hematocrit Calculated, Venous 36.0 36.0 - 46.0 %    POCT Sodium, Venous 135 (L) 136 - 145 mmol/L    POCT Potassium, Venous 3.4 (L) 3.5 - 5.3 mmol/L    POCT Chloride, Venous 103 98 - 107 mmol/L    POCT Ionized Calicum, Venous 1.17 1.10 - 1.33 mmol/L    POCT Glucose, Venous 129 (H) 74 - 99 mg/dL    POCT Lactate, Venous 1.1 0.4 - 2.0 mmol/L    POCT Base Excess, Venous 1.7 -2.0 - 3.0 mmol/L    POCT HCO3 Calculated, Venous 26.6 (H) 22.0 - 26.0 mmol/L    POCT Hemoglobin, Venous 12.0 12.0 - 16.0 g/dL    POCT Anion Gap, Venous 9.0 (L) 10.0 - 25.0 mmol/L    Patient Temperature 37.0 degrees Celsius    FiO2 21 %   Type And Screen   Result Value Ref Range    ABO TYPE B     Rh TYPE POS     ANTIBODY SCREEN NEG    Bilirubin, Direct   Result Value Ref Range    Bilirubin, Direct 0.2 0.0 - 0.3 mg/dL   Sars-CoV-2 and Influenza A/B PCR   Result Value Ref Range    Flu A Result Not Detected Not Detected    Flu B Result Not Detected Not Detected    Coronavirus 2019, PCR Not Detected Not Detected       XR chest 1 view   Final Result   No acute cardiopulmonary disease.  No significant interval change from   the  prior exam..   Signed by Larry James MD      CT angio brain attack neck w IV contrast and post procedure   Final Result   * Minor atherosclerotic changes in the cavernous and supraclinoid   portion of the internal carotid arteries. Without measurable luminal   narrowing. No evidence of intracranial aneurysm, vascular   malformation or branch occlusion.        MACRO:   none        Signed by: Sudeep Livingston 12/30/2023 10:44 AM   Dictation workstation:   [id number]      CT brain attack head wo IV contrast   Final Result   1. Subacute nonhemorrhagic infarct left posterior frontal parietal   region not seen on 03/22/2023.   2. Remote left thalamic infarct.        MACRO:   Arlin Washington discussed the significance and urgency of this   critical finding by telephone with  Dr. Velasquez on 12/30/2023 at   10:13 am.  (**-RCF-**) Findings:  See findings.        Signed by: Arlin Washington 12/30/2023 10:18 AM   Dictation workstation:   [id number]      MR brain wo IV contrast    (Results Pending)       Scheduled medications  aspirin, 81 mg, oral, Daily  atorvastatin, 80 mg, oral, Nightly  carvedilol, 6.25 mg, oral, BID  enoxaparin, 40 mg, subcutaneous, q24h  levothyroxine, 25 mcg, oral, Daily  lisinopril, 40 mg, oral, Daily  [START ON 12/31/2023] pantoprazole, 40 mg, oral, Daily before breakfast  polyethylene glycol, 17 g, oral, Daily      Continuous medications     PRN medications  PRN medications: hydrALAZINE **FOLLOWED BY** [START ON 1/1/2024] hydrALAZINE, labetaloL, oxygen, sodium chloride      Assessment/Plan   Principal Problem:    Cerebrovascular accident (CVA), unspecified mechanism (CMS/HCC)     L sided CVA with aphasia and Right sided weakness  -CT with subacute L sided infarct   -CTA with atherosclerotic changes   -MRI brain ordered  -Limited echo to eval for clot as she has been non compliant with medications   -Consult neuro  -ASA  -Statin   -Speech eval  -PT/OT  -Neuro checks [id number]   -Permissive HTN allow  for systolic <220 per discussion with neuro Dr. Lee   -Tele   -Check lipids  -Check A1c     2. Hx of HTN  -Per son appears not likely compliant with medications  -Will hold home medications  -As needed hydralazine or Labetolol     3. Atrial Fibrillation   -Continue with IV labetalol for now, unable to swallow oral medications   -Was on Eliquis at some point but suspect has not been taking     4. Hypothyroidism  -Restart thyroid medication once she can tolerate orals     5. Chronic anemia   -No s/s of bleeding   -Will monitor     DVT ppx   Lovenox              Raffaele Ace, [facility]

## 2023-12-31 LAB
GLUCOSE BLD MANUAL STRIP-MCNC: 108 MG/DL (ref 74–99)
GLUCOSE BLD MANUAL STRIP-MCNC: 120 MG/DL (ref 74–99)
GLUCOSE BLD MANUAL STRIP-MCNC: 130 MG/DL (ref 74–99)
GLUCOSE BLD MANUAL STRIP-MCNC: 154 MG/DL (ref 74–99)

## 2023-12-31 PROCEDURE — 2500000004 HC RX 250 GENERAL PHARMACY W/ HCPCS (ALT 636 FOR OP/ED): Performed by: INTERNAL MEDICINE

## 2023-12-31 PROCEDURE — 97166 OT EVAL MOD COMPLEX 45 MIN: CPT | Mod: GO

## 2023-12-31 PROCEDURE — 82947 ASSAY GLUCOSE BLOOD QUANT: CPT

## 2023-12-31 PROCEDURE — 99233 SBSQ HOSP IP/OBS HIGH 50: CPT | Performed by: INTERNAL MEDICINE

## 2023-12-31 PROCEDURE — 99232 SBSQ HOSP IP/OBS MODERATE 35: CPT | Performed by: STUDENT IN AN ORGANIZED HEALTH CARE EDUCATION/TRAINING PROGRAM

## 2023-12-31 PROCEDURE — 96372 THER/PROPH/DIAG INJ SC/IM: CPT | Performed by: INTERNAL MEDICINE

## 2023-12-31 PROCEDURE — 97162 PT EVAL MOD COMPLEX 30 MIN: CPT | Mod: GP

## 2023-12-31 PROCEDURE — 1200000002 HC GENERAL ROOM WITH TELEMETRY DAILY

## 2023-12-31 RX ORDER — DEXTROSE MONOHYDRATE AND SODIUM CHLORIDE 5; .225 G/100ML; G/100ML
75 INJECTION, SOLUTION INTRAVENOUS CONTINUOUS
Status: DISCONTINUED | OUTPATIENT
Start: 2023-12-31 | End: 2024-01-03 | Stop reason: HOSPADM

## 2023-12-31 RX ADMIN — ENOXAPARIN SODIUM 40 MG: 40 INJECTION SUBCUTANEOUS at 12:38

## 2023-12-31 RX ADMIN — DEXTROSE AND SODIUM CHLORIDE 75 ML/HR: 5; 200 INJECTION, SOLUTION INTRAVENOUS at 12:40

## 2023-12-31 ASSESSMENT — COGNITIVE AND FUNCTIONAL STATUS - GENERAL
STANDING UP FROM CHAIR USING ARMS: A LITTLE
DRESSING REGULAR UPPER BODY CLOTHING: A LITTLE
MOBILITY SCORE: 15
PERSONAL GROOMING: A LITTLE
WALKING IN HOSPITAL ROOM: A LOT
TURNING FROM BACK TO SIDE WHILE IN FLAT BAD: A LOT
MOBILITY SCORE: 11
MOVING TO AND FROM BED TO CHAIR: A LITTLE
MOVING FROM LYING ON BACK TO SITTING ON SIDE OF FLAT BED WITH BEDRAILS: A LITTLE
TOILETING: A LITTLE
DAILY ACTIVITIY SCORE: 16
TURNING FROM BACK TO SIDE WHILE IN FLAT BAD: A LITTLE
CLIMB 3 TO 5 STEPS WITH RAILING: TOTAL
WALKING IN HOSPITAL ROOM: A LOT
STANDING UP FROM CHAIR USING ARMS: A LOT
EATING MEALS: TOTAL
CLIMB 3 TO 5 STEPS WITH RAILING: TOTAL
MOVING TO AND FROM BED TO CHAIR: A LOT
DRESSING REGULAR LOWER BODY CLOTHING: A LITTLE
HELP NEEDED FOR BATHING: A LITTLE
MOVING FROM LYING ON BACK TO SITTING ON SIDE OF FLAT BED WITH BEDRAILS: A LOT

## 2023-12-31 ASSESSMENT — PAIN SCALES - GENERAL
PAINLEVEL_OUTOF10: 0 - NO PAIN

## 2023-12-31 ASSESSMENT — PAIN - FUNCTIONAL ASSESSMENT
PAIN_FUNCTIONAL_ASSESSMENT: 0-10
PAIN_FUNCTIONAL_ASSESSMENT: 0-10
PAIN_FUNCTIONAL_ASSESSMENT: WONG-BAKER FACES
PAIN_FUNCTIONAL_ASSESSMENT: 0-10
PAIN_FUNCTIONAL_ASSESSMENT: 0-10

## 2023-12-31 NOTE — PROGRESS NOTES
Subjective   History Of Present Illness  Patient is seen seated, with nursing staff at bedside.  She is densely aphasic and unable to participate with examination purposefully.       Objective   Last Recorded Vitals  Blood pressure (!) 141/94, pulse 82, temperature 36.3 °C (97.3 °F), resp. rate 18, weight 76.2 kg (167 lb 15.9 oz), SpO2 98 %.    Scheduled medications  aspirin, 81 mg, oral, Daily  atorvastatin, 80 mg, oral, Nightly  carvedilol, 6.25 mg, oral, BID  enoxaparin, 40 mg, subcutaneous, q24h  levothyroxine, 25 mcg, oral, Daily  lisinopril, 40 mg, oral, Daily  pantoprazole, 40 mg, oral, Daily before breakfast  perflutren lipid microspheres, 0.5-10 mL of dilution, intravenous, Once in imaging  perflutren protein A microsphere, 0.5 mL, intravenous, Once in imaging  polyethylene glycol, 17 g, oral, Daily  sulfur hexafluoride microsphr, 2 mL, intravenous, Once in imaging      Continuous medications     PRN medications  PRN medications: hydrALAZINE **FOLLOWED BY** [START ON 1/1/2024] hydrALAZINE, metoprolol, oxygen, sodium chloride         Exam   Neurological Exam  Mental Status  Awake and alert. Severe dysarthria present. Global aphasia present. Attention and concentration are normal.    Cranial Nerves  CN II: Responds to visual stimuli seemingly appropriately, there is some question of a left hemisphere at least partial hemianopsia.  CN III, IV, VI: Pupils equal and reactive, left gaze preference is appreciated though does briefly cross midline.  CN V: Difficult to assess, respond seemingly symmetrically.  CN VII:  Right: There is central facial weakness.  CN VIII: Hearing is normal.  CN IX, X: Does not cooperate, tolerating secretions and protecting airway.  CN XI: Supports the head, turns bilaterally, symmetric.  CN XII: Tongue deviates to the left.    Motor  Normal muscle bulk throughout.  Hold all 4 extremities antigravity with some resistance, though requires pantomiming.  There is subtle asymmetry with  right weaker than left.    Sensory  Light touch is normal in upper and lower extremities.     Reflexes  Deep tendon reflexes are 2+ and symmetric in all four extremities.    Coordination    Does not readily cooperate with finger-nose testing.    Gait    Deferred for safety.    Physical Exam  Constitutional:       General: She is awake.   Neurological:      Mental Status: She is alert.      Cranial Nerves: Dysarthria present.      Deep Tendon Reflexes: Reflexes are normal and symmetric.         Relevant Results    Lab Results  Results from last 72 hours   Lab Units 12/30/23  1034   GLUCOSE mg/dL 121*   SODIUM mmol/L 138   POTASSIUM mmol/L 3.3*   CHLORIDE mmol/L 105   CO2 mmol/L 26   ANION GAP mmol/L 10   BUN mg/dL 26*   CREATININE mg/dL 0.83   EGFR mL/min/1.73m*2 76   CALCIUM mg/dL 8.5*   ALBUMIN g/dL 3.6      Results from last 72 hours   Lab Units 12/30/23  1034   WBC AUTO x10*3/uL 9.2   NRBC AUTO /100 WBCs 0.0   RBC AUTO x10*6/uL 3.82*   HEMOGLOBIN g/dL 11.1*   HEMATOCRIT % 34.1*   MCV fL 89   MCH pg 29.1   MCHC g/dL 32.6   RDW % 16.2*   PLATELETS AUTO x10*3/uL 178        Transthoracic Echo (TTE) Limited   Final Result      MR brain wo IV contrast   Final Result   Multiple areas acute ischemia are scattered through the left and to a   lesser degree right frontal lobe as noted above.        The head of the right caudate nucleus in the posterior aspect of the   right corona radiata have punctate old hemosiderin/mineral   depositions. No acute hemorrhage is noted.        The parenchymal hyperintensities elsewhere are nonspecific and may be   secondary to chronic microvascular ischemic change.        MACRO:   None        Signed by: Vishnu Husain 12/30/2023 2:04 PM   Dictation workstation:   [id number]      XR chest 1 view   Final Result   No acute cardiopulmonary disease.  No significant interval change from   the prior exam..   Signed by Larry James MD      CT angio brain attack neck w IV contrast and post procedure    Final Result   * Minor atherosclerotic changes in the cavernous and supraclinoid   portion of the internal carotid arteries. Without measurable luminal   narrowing. No evidence of intracranial aneurysm, vascular   malformation or branch occlusion.        MACRO:   none        Signed by: Sudeep Livingston 12/30/2023 10:44 AM   Dictation workstation:   [id number]      CT brain attack head wo IV contrast   Final Result   1. Subacute nonhemorrhagic infarct left posterior frontal parietal   region not seen on 03/22/2023.   2. Remote left thalamic infarct.        MACRO:   Arlin Washington discussed the significance and urgency of this   critical finding by telephone with  Dr. Velasquez on 12/30/2023 at   10:13 am.  (**-RCF-**) Findings:  See findings.        Signed by: Arlin Washington 12/30/2023 10:18 AM   Dictation workstation:   [id number]            Assessment/Plan     #Acute ischemic stroke, multifocal, embolic  #Afib not on AC  Type: Ischemic stroke  Subtype/etiology: Suspected cardioembolic  Vessels involved: Bilateral MCA branches  Neurological manifestations:  NIHSS (worst at presentation): 16   Diagnostic evaluation: CTA, MRI  Antiplatelet/antithrombotic plan for stroke prevention: Patient will need anticoagulation given A-fib.  From stroke perspective, okay to initiate 1/6/2024.  Continue daily aspirin until then, unless indicated for other systemic etiology  Prior encouter with [facility] 3/2022 showed afib with RVR. Chadsvasc 3 at the time, currently high risk with score of at least 5 for age, sex, HTN, and stroke.    Consider eval for watchman device if AC is again declined  VTE prophylaxis: No contraindications from stroke perspective, per primary  Vascular Risk Factor modification goals:  Blood pressure goals: avoid hypotension SBP <100 that could worsen cerebral perfusion, Ischemic stroke- early permissive hypertension SBP < 220 mmHg with cautious inpatient lowering  Lipid Goals: education on healthy diet and  statin therapy to maintain or achieve goal LDL-cholesterol < 70mg  Glucose Goals: early treatment of hyperglycemia to goal glucose 140-180 mg/dl with long-term goal A1c < 7%   Smoking Cessation and Education  Assessment for Rehabilitation needs   Patient and family education on signs and symptoms of stroke, calling 911, healthy strategies for stroke prevention.    Please call with concerns or questions             I spent 35 minutes in the professional and overall care of this patient.      Lexx Lee MD

## 2023-12-31 NOTE — PROGRESS NOTES
Zo Nunez is a 68 y.o. female on day 1 of admission presenting with Cerebrovascular accident (CVA), unspecified mechanism (CMS/HCC).    Subjective   No acute events overnight. Still with aphasia but R side appears to be moving a little bit better. Tried to communicate with writing but she is not able to do this either, just draws circles.        Objective     VITALS  Blood pressure (!) 189/108, pulse 110, temperature 36.7 °C (98.1 °F), temperature source Temporal, resp. rate 18, weight 76.2 kg (167 lb 15.9 oz), SpO2 97 %.  Physical Exam  Vitals and nursing note reviewed.   Constitutional:       General: She is not in acute distress.     Appearance: Normal appearance. She is not toxic-appearing.   HENT:      Head: Normocephalic and atraumatic.   Eyes:      Extraocular Movements: Extraocular movements intact.      Conjunctiva/sclera: Conjunctivae normal.   Cardiovascular:      Rate and Rhythm: Normal rate and regular rhythm.      Heart sounds: Normal heart sounds.   Pulmonary:      Effort: Pulmonary effort is normal. No respiratory distress.      Breath sounds: Normal breath sounds. No wheezing.   Abdominal:      General: Abdomen is flat. Bowel sounds are normal.      Palpations: Abdomen is soft.   Skin:     Capillary Refill: Capillary refill takes less than 2 seconds.      Coloration: Skin is not jaundiced or pale.   Neurological:      Mental Status: She is alert.      Motor: Weakness (on the right side, both upper and lower, but improved) present.      Comments: Still with aphasia            Intake/Output last 3 Shifts:  I/O last 3 completed shifts:  In: - (0 mL/kg)   Out: 400 (5.2 mL/kg) [Urine:400 (0.1 mL/kg/hr)]  Weight: 76.2 kg     Relevant Results  Results for orders placed or performed during the hospital encounter of 12/30/23 (from the past 24 hour(s))   Transthoracic Echo (TTE) Limited   Result Value Ref Range    AV pk roxana 1.57     LV biplane EF 58     MV avg E/e' ratio 6.60     MV E/A ratio 1.25      Tricuspid annular plane systolic excursion 2.4     RV free wall pk S' 19.10     LVIDd 4.40     RVSP 17.6     AV pk grad 9.9     LV A4C EF 58.2    B-Type Natriuretic Peptide   Result Value Ref Range     (H) 0 - 99 pg/mL   POCT GLUCOSE   Result Value Ref Range    POCT Glucose 120 (H) 74 - 99 mg/dL   POCT GLUCOSE   Result Value Ref Range    POCT Glucose 154 (H) 74 - 99 mg/dL       Imaging Results  Transthoracic Echo (TTE) Limited   Final Result      MR brain wo IV contrast   Final Result   Multiple areas acute ischemia are scattered through the left and to a   lesser degree right frontal lobe as noted above.        The head of the right caudate nucleus in the posterior aspect of the   right corona radiata have punctate old hemosiderin/mineral   depositions. No acute hemorrhage is noted.        The parenchymal hyperintensities elsewhere are nonspecific and may be   secondary to chronic microvascular ischemic change.        MACRO:   None        Signed by: Vishnu Husain 12/30/2023 2:04 PM   Dictation workstation:   [id number]      XR chest 1 view   Final Result   No acute cardiopulmonary disease.  No significant interval change from   the prior exam..   Signed by Larry James MD      CT angio brain attack neck w IV contrast and post procedure   Final Result   * Minor atherosclerotic changes in the cavernous and supraclinoid   portion of the internal carotid arteries. Without measurable luminal   narrowing. No evidence of intracranial aneurysm, vascular   malformation or branch occlusion.        MACRO:   none        Signed by: Sudeep Livingston 12/30/2023 10:44 AM   Dictation workstation:   [id number]      CT brain attack head wo IV contrast   Final Result   1. Subacute nonhemorrhagic infarct left posterior frontal parietal   region not seen on 03/22/2023.   2. Remote left thalamic infarct.        MACRO:   Arlin Washington discussed the significance and urgency of this   critical finding by telephone with    Eva on 12/30/2023 at   10:13 am.  (**-RCF-**) Findings:  See findings.        Signed by: Arlin Washington 12/30/2023 10:18 AM   Dictation workstation:   [id number]          Medications:  aspirin, 81 mg, oral, Daily  atorvastatin, 80 mg, oral, Nightly  carvedilol, 6.25 mg, oral, BID  enoxaparin, 40 mg, subcutaneous, q24h  levothyroxine, 25 mcg, oral, Daily  lisinopril, 40 mg, oral, Daily  pantoprazole, 40 mg, oral, Daily before breakfast  perflutren lipid microspheres, 0.5-10 mL of dilution, intravenous, Once in imaging  perflutren protein A microsphere, 0.5 mL, intravenous, Once in imaging  polyethylene glycol, 17 g, oral, Daily  sulfur hexafluoride microsphr, 2 mL, intravenous, Once in imaging       PRN medications: hydrALAZINE **FOLLOWED BY** [START ON 1/1/2024] hydrALAZINE, metoprolol, oxygen, sodium chloride        Assessment/Plan   Principal Problem:    Cerebrovascular accident (CVA), unspecified mechanism (CMS/HCC)    L sided CVA with aphasia and Right sided weakness  -CT with subacute L sided infarct   -CTA with atherosclerotic changes   -MRI with multiple areas of ischemia on the L side   -Limited echo to eval for clot as she has been non compliant with medications   -Consult neuro  -ASA  -Statin   -Speech eval  -PT/OT  -Neuro checks [id number]   -Permissive HTN allow for systolic <220 per discussion with neuro Dr. Lee   -Tele   -Check lipids  -Check A1c      2. Hx of HTN  -Per son appears not likely compliant with medications  -Will hold home medications  -As needed hydralazine or Labetolol      3. Atrial Fibrillation   -Continue with IV labetalol for now, unable to swallow oral medications   -Can restart in about a week per Neuro      4. Hypothyroidism  -Restart thyroid medication once she can tolerate orals      5. Chronic anemia   -No s/s of bleeding   -Will monitor     DVT Prophylaxis:  Lovenox subq    Disposition:  Suspect will need placement for rehab.     Raffaele Ace, [facility]  Medicine

## 2023-12-31 NOTE — NURSING NOTE
Terry Paulino notified pt b/p 196/86-73 npo d/t aspiration precautions parameter for prn IV Hydralazine >220 sbp or IV metoprolol >120 HR nno at this time Pt with global aphasia unable to determine if s/x present remains afib on monitor   
Jozef Duque MD

## 2023-12-31 NOTE — PROGRESS NOTES
Physical Therapy    Physical Therapy Evaluation    Patient Name: Zo Nunez  MRN: [id number]  Today's Date: 12/31/2023   Time Calculation  Start Time: 0944  Stop Time: 1005  Time Calculation (min): 21 min    Assessment/Plan   PT Assessment  PT Assessment Results: Decreased strength, Decreased endurance, Impaired balance, Decreased mobility, Decreased coordination, Decreased cognition, Impaired judgement  Rehab Prognosis: Excellent  Evaluation/Treatment Tolerance: Patient tolerated treatment well  Medical Staff Made Aware: Yes  Strengths: Premorbid level of function, Ability to acquire knowledge  Barriers to Participation: Comorbidities, Housing layout  End of Session Communication: Bedside nurse  Assessment Comment: Patient admitted for acute stroke; currently min A x2/mod A x1 and unsafe to ambulate alone. Will benefit from ongoing PT in house and following DC to maximize fucntional I.  End of Session Patient Position: Up in chair, Alarm on  IP OR SWING BED PT PLAN  Inpatient or Swing Bed: Inpatient  PT Plan  Treatment/Interventions: Bed mobility, Transfer training, Gait training, Stair training, Balance training, Neuromuscular re-education, Strengthening, Endurance training, Range of motion, Therapeutic exercise, Therapeutic activity, Home exercise program, Positioning, Postural re-education  PT Plan: Skilled PT  PT Frequency: 4 times per week  PT Discharge Recommendations: High intensity level of continued care  Equipment Recommended upon Discharge: Wheeled walker  PT Recommended Transfer Status: Assistive device, Assist x1  PT - OK to Discharge: Yes (per POC)      Subjective   General Visit Information:  General  Reason for Referral: impaired moblity, admission for stroke  Past Medical History Relevant to Rehab: AFib, HTN, alcoholism  Family/Caregiver Present: No  Co-Treatment: OT  Co-Treatment Reason: to maximize mobility and safety  Prior to Session Communication: Bedside nurse  Patient Position Received:  Bed, 3 rail up, Alarm on  Preferred Learning Style: kinesthetic, verbal, visual  General Comment: patient with R facial droop/drooling. purewick and brief. tele, PIV.  Home Living:  Home Living  [facility]  Home Living Comments: per EMR, pt lives in [facility], up/down unit with 1 bathroom and 2 bedrooms. level entry.  Prior Level of Function:  Prior Function Per Pt/Caregiver Report  Level of [place]: Independent with ADLs and functional transfers, Independent with homemaking with ambulation  Prior Function Comments: pt nodded yes to being independent prior to arrival  Precautions:  Precautions  Medical Precautions: Fall precautions  Vital Signs:  Vital Signs  BP: (!) 189/108  BP Location: Left arm  Patient Position: Lying    Objective   Pain:  Pain Assessment  Pain Assessment: 0-10  Pain Score: 0 - No pain  Cognition:  Cognition  Overall Cognitive Status:  (patient pleasant, expressive aphasia demonstrated; followed ~75-90% simple commands with visual cues. patient appropraitely tearful regarding mobility.)  Orientation Level: Unable to assess    General Assessments:  General Observation  General Observation: patient completed mobility with moderate overall cues; challenged with mild instability with gait; functioning below baseline.               Activity Tolerance  Endurance: Tolerates 10 - 20 min exercise with multiple rests    Sensation  Light Touch: No apparent deficits    Strength  Strength Comments: globally equal (4-/5 strength) but impaired motor control on R hemibody  Strength  Strength Comments: globally equal (4-/5 strength) but impaired motor control on R hemibody    Perception  Inattention/Neglect: Appears intact  Initiation: Cues to initiate tasks  Motor Planning: Cues to use objects appropriately  Perseveration: Not present      Coordination  Movements are Fluid and Coordinated:  (mild decreased coordination on R FMC)    Postural Control  Posture Comment: patient with (+) postural  correction/righting reactions with min cues    Static Sitting Balance  Static Sitting-Comment/Number of Minutes: CGA to SBA  Dynamic Sitting Balance  Dynamic Sitting-Comments: min A to CGA    Static Standing Balance  Static Standing-Comment/Number of Minutes: min A x2  Dynamic Standing Balance  Dynamic Standing-Comments: min A x2  Functional Assessments:  Bed Mobility  Bed Mobility: Yes  Bed Mobility 1  Bed Mobility 1: Supine to sitting  Level of Assistance 1: Moderate assistance, Moderate verbal cues, Moderate tactile cues  Bed Mobility Comments 1: HOB elevated    Transfers  Transfer: Yes  Transfer 1  Technique 1: Sit to stand  Transfer Device 1: Gait belt  Transfer Level of Assistance 1: Hand held assistance, Minimum assistance, +2, Moderate tactile cues, Moderate verbal cues  Trials/Comments 1: cues for sequencing  Transfers 2  Technique 2: Stand to sit  Transfer Device 2: Gait belt  Transfer Level of Assistance 2: Minimum assistance, +2, Moderate tactile cues, Moderate verbal cues  Trials/Comments 2: cues for safety, reaching back to chair armrest  Transfers 3  Transfer From 3: Bed to  Transfer to 3: Chair with drop arm  Technique 3: Sit to stand, Stand to sit  Transfer Device 3: Gait belt  Transfer Level of Assistance 3: Minimum assistance, +2, Moderate tactile cues, Moderate verbal cues  Trials/Comments 3: cues for sequencing/safety    Ambulation/Gait Training  Ambulation/Gait Training Performed: Yes  Ambulation/Gait Training 1  Surface 1: Level tile  Gait Support Devices: Gait belt  Assistance 1: Hand held assistance, Minimum assistance, Moderate tactile cues, Moderate verbal cues (x2 person A)  Quality of Gait 1: Diminished heel strike, Decreased step length, Inconsistent stride length (decreased gait velocity, increased time to turn and retro step)  Comments/Distance (ft) 1: 15 feet completed with cues  Extremity/Trunk Assessments:  RUE   RUE : Within Functional Limits  LUE   LUE: Within Functional  Limits  RLE   RLE : Within Functional Limits  LLE   LLE : Within Functional Limits  Outcome Measures:  [facility] Basic Mobility  Turning from your back to your side while in a flat bed without using bedrails: A lot  Moving from lying on your back to sitting on the side of a flat bed without using bedrails: A lot  Moving to and from bed to chair (including a wheelchair): A lot  Standing up from a chair using your arms (e.g. wheelchair or bedside chair): A lot  To walk in hospital room: A lot  Climbing 3-5 steps with railing: Total  Basic Mobility - Total Score: 11    Tinetti  Sitting Balance: Steady, safe  Arises: Unable without help  Attempts to Arise: Unable without help  Immediate Standing Balance (First 5 Seconds): Unsteady (Swaggers, moves feet, trunk sway)  Standing Balance: Steady but wide stance, uses cane or other support  Nudged: Staggers, grabs, catches self  Eyes Closed: Steady  Turned 360 Degrees: Steadiness: Unsteady (Grabs, staggers)  Turned 360 Degrees: Continuity of Steps: Discontinuous steps  Sitting Down: Unsafe (Misjudges distance, falls into chair)  Balance Score: 4  Initiation of Gait: No hesitancy  Step Height: R Swing Foot: Right foot complete clears floor  Step Length: R Swing Foot: Passes left stance foot  Step Height: L Swing Foot: Left foot complete clears floor  Step Length: L Swing Foot: Passes right stance foot  Step Symmetry: Right and left step appear equal  Step Continuity: Steps appear continuous  Path: Mild/moderate deviation or uses walking aid  Trunk: Marked sway or uses walking aid  Walking Time: Heels apart  Gait Score: 8  Total Score: 12    Encounter Problems       Encounter Problems (Active)       Balance       STG - Maintains dynamic standing balance with upper extremity support       Start:  12/31/23    Expected End:  01/14/24       INTERVENTIONS:  1. Practice standing with minimal Ax1 and LRAD support.  2. Educate patient about standing tolerance.  3. Educate patient about  independence with gait, transfers, and ADL's.  4. Educate patient about use of assistive device.  5. Educate patient about self-directed care.         Goal 1 - Tinetti       Start:  12/31/23    Expected End:  01/14/24       Increase Tinetti DARIANA score to >24/28 to indicate low risk of falling.             Mobility       STG - Patient will ambulate       Start:  12/31/23    Expected End:  01/14/24       >70 feet x2 with LRAD and CGA support, demonstrating safe and steady pattern         STG - Patient will ascend and descend four to six stairs       Start:  12/31/23    Expected End:  01/14/24       With min A x1 and safe technique.            Pain - Adult          Transfers       STG - Patient will perform bed mobility       Start:  12/31/23    Expected End:  01/14/24       With CGA support and HOB flat to L and R         STG - Patient will transfer sit to and from stand       Start:  12/31/23    Expected End:  01/14/24       With CGA support and LRAD, demonstrating safe technique                Education Documentation  Precautions, taught by Jonelle Snider PT at 12/31/2023 11:22 AM.  Learner: Patient  Readiness: Acceptance  Method: Demonstration, Explanation  Response: Needs Reinforcement    Body Mechanics, taught by Jonelle Snider PT at 12/31/2023 11:22 AM.  Learner: Patient  Readiness: Acceptance  Method: Demonstration, Explanation  Response: Needs Reinforcement    Home Exercise Program, taught by Jonelle Snider, PT at 12/31/2023 11:22 AM.  Learner: Patient  Readiness: Acceptance  Method: Demonstration, Explanation  Response: Needs Reinforcement    Mobility Training, taught by Jonelle Snider PT at 12/31/2023 11:22 AM.  Learner: Patient  Readiness: Acceptance  Method: Demonstration, Explanation  Response: Needs Reinforcement    Education Comments  No comments found.

## 2023-12-31 NOTE — CARE PLAN
The patient's goals for the shift include      The clinical goals for the shift include maintain safety/hemodynanic stability

## 2024-01-01 LAB
ANION GAP SERPL CALC-SCNC: 14 MMOL/L (ref 10–20)
BUN SERPL-MCNC: 17 MG/DL (ref 6–23)
CALCIUM SERPL-MCNC: 8.9 MG/DL (ref 8.6–10.3)
CHLORIDE SERPL-SCNC: 106 MMOL/L (ref 98–107)
CO2 SERPL-SCNC: 23 MMOL/L (ref 21–32)
CREAT SERPL-MCNC: 0.84 MG/DL (ref 0.5–1.05)
ERYTHROCYTE [DISTWIDTH] IN BLOOD BY AUTOMATED COUNT: 16 % (ref 11.5–14.5)
GFR SERPL CREATININE-BSD FRML MDRD: 75 ML/MIN/1.73M*2
GLUCOSE BLD MANUAL STRIP-MCNC: 125 MG/DL (ref 74–99)
GLUCOSE BLD MANUAL STRIP-MCNC: 139 MG/DL (ref 74–99)
GLUCOSE BLD MANUAL STRIP-MCNC: 140 MG/DL (ref 74–99)
GLUCOSE BLD MANUAL STRIP-MCNC: 143 MG/DL (ref 74–99)
GLUCOSE SERPL-MCNC: 119 MG/DL (ref 74–99)
HCT VFR BLD AUTO: 43.7 % (ref 36–46)
HGB BLD-MCNC: 13.8 G/DL (ref 12–16)
MCH RBC QN AUTO: 28.9 PG (ref 26–34)
MCHC RBC AUTO-ENTMCNC: 31.6 G/DL (ref 32–36)
MCV RBC AUTO: 92 FL (ref 80–100)
NRBC BLD-RTO: 0 /100 WBCS (ref 0–0)
PLATELET # BLD AUTO: 188 X10*3/UL (ref 150–450)
POTASSIUM SERPL-SCNC: 3.4 MMOL/L (ref 3.5–5.3)
RBC # BLD AUTO: 4.77 X10*6/UL (ref 4–5.2)
SODIUM SERPL-SCNC: 140 MMOL/L (ref 136–145)
WBC # BLD AUTO: 7 X10*3/UL (ref 4.4–11.3)

## 2024-01-01 PROCEDURE — 80048 BASIC METABOLIC PNL TOTAL CA: CPT | Performed by: INTERNAL MEDICINE

## 2024-01-01 PROCEDURE — 36415 COLL VENOUS BLD VENIPUNCTURE: CPT | Performed by: INTERNAL MEDICINE

## 2024-01-01 PROCEDURE — 1200000002 HC GENERAL ROOM WITH TELEMETRY DAILY

## 2024-01-01 PROCEDURE — 99233 SBSQ HOSP IP/OBS HIGH 50: CPT | Performed by: INTERNAL MEDICINE

## 2024-01-01 PROCEDURE — 2500000005 HC RX 250 GENERAL PHARMACY W/O HCPCS: Performed by: INTERNAL MEDICINE

## 2024-01-01 PROCEDURE — 85027 COMPLETE CBC AUTOMATED: CPT | Performed by: INTERNAL MEDICINE

## 2024-01-01 PROCEDURE — 82947 ASSAY GLUCOSE BLOOD QUANT: CPT

## 2024-01-01 PROCEDURE — 96372 THER/PROPH/DIAG INJ SC/IM: CPT | Performed by: INTERNAL MEDICINE

## 2024-01-01 PROCEDURE — 2500000004 HC RX 250 GENERAL PHARMACY W/ HCPCS (ALT 636 FOR OP/ED): Performed by: INTERNAL MEDICINE

## 2024-01-01 PROCEDURE — 97116 GAIT TRAINING THERAPY: CPT | Mod: GP,CQ

## 2024-01-01 RX ORDER — POTASSIUM CHLORIDE 14.9 MG/ML
20 INJECTION INTRAVENOUS
Status: COMPLETED | OUTPATIENT
Start: 2024-01-01 | End: 2024-01-01

## 2024-01-01 RX ADMIN — ENOXAPARIN SODIUM 40 MG: 40 INJECTION SUBCUTANEOUS at 12:44

## 2024-01-01 RX ADMIN — POTASSIUM CHLORIDE 20 MEQ: 14.9 INJECTION, SOLUTION INTRAVENOUS at 12:45

## 2024-01-01 RX ADMIN — DEXTROSE AND SODIUM CHLORIDE 75 ML/HR: 5; 200 INJECTION, SOLUTION INTRAVENOUS at 18:36

## 2024-01-01 RX ADMIN — POTASSIUM CHLORIDE 20 MEQ: 14.9 INJECTION, SOLUTION INTRAVENOUS at 15:12

## 2024-01-01 RX ADMIN — DEXTROSE AND SODIUM CHLORIDE 75 ML/HR: 5; 200 INJECTION, SOLUTION INTRAVENOUS at 02:42

## 2024-01-01 RX ADMIN — METOPROLOL TARTRATE 5 MG: 5 INJECTION INTRAVENOUS at 11:16

## 2024-01-01 ASSESSMENT — COGNITIVE AND FUNCTIONAL STATUS - GENERAL
CLIMB 3 TO 5 STEPS WITH RAILING: A LOT
MOBILITY SCORE: 16
STANDING UP FROM CHAIR USING ARMS: A LITTLE
WALKING IN HOSPITAL ROOM: A LITTLE
MOBILITY SCORE: 19
CLIMB 3 TO 5 STEPS WITH RAILING: A LOT
TURNING FROM BACK TO SIDE WHILE IN FLAT BAD: A LITTLE
DRESSING REGULAR LOWER BODY CLOTHING: A LOT
MOVING TO AND FROM BED TO CHAIR: A LITTLE
PERSONAL GROOMING: A LOT
MOVING TO AND FROM BED TO CHAIR: A LITTLE
DAILY ACTIVITIY SCORE: 12
EATING MEALS: A LOT
DRESSING REGULAR UPPER BODY CLOTHING: A LOT
MOVING FROM LYING ON BACK TO SITTING ON SIDE OF FLAT BED WITH BEDRAILS: A LITTLE
TOILETING: A LOT
WALKING IN HOSPITAL ROOM: A LOT
STANDING UP FROM CHAIR USING ARMS: A LITTLE
HELP NEEDED FOR BATHING: A LOT

## 2024-01-01 ASSESSMENT — PAIN SCALES - GENERAL
PAINLEVEL_OUTOF10: 0 - NO PAIN

## 2024-01-01 ASSESSMENT — PAIN - FUNCTIONAL ASSESSMENT
PAIN_FUNCTIONAL_ASSESSMENT: 0-10

## 2024-01-01 NOTE — PROGRESS NOTES
Physical Therapy    Physical Therapy Treatment    Patient Name: Zo Nunez  MRN: [id number]  Today's Date: 1/1/2024  Time Calculation  Start Time: 1529  Stop Time: 1546  Time Calculation (min): 17 min       Assessment/Plan   PT Assessment  PT Assessment Results: Decreased strength, Decreased endurance, Impaired balance, Decreased mobility, Decreased coordination, Decreased cognition, Impaired judgement  Rehab Prognosis: Excellent  Evaluation/Treatment Tolerance: Patient tolerated treatment well  Medical Staff Made Aware: Yes  Barriers to Participation: Comorbidities, Housing layout  End of Session Communication: Bedside nurse  Assessment Comment: Pt tolerated session fair. Pt is CGA/MINAx1 for all aspects of fucntional mobility. Pt is mildly impulsive requiring constant verbal/tactile cues for overall safety and direction.  End of Session Patient Position: Bed, 3 rail up, Alarm on (call light in reach)     PT Plan  Treatment/Interventions: Bed mobility, Transfer training, Gait training, Stair training, Balance training, Neuromuscular re-education, Strengthening, Endurance training, Range of motion, Therapeutic exercise, Therapeutic activity, Home exercise program, Positioning, Postural re-education  PT Plan: Skilled PT  PT Frequency: 4 times per week  PT Discharge Recommendations: High intensity level of continued care  Equipment Recommended upon Discharge: Wheeled walker  PT Recommended Transfer Status: Assist x1, Assistive device  PT - OK to Discharge: Yes      General Visit Information:   PT  Visit  PT Received On: 01/01/24  General  Reason for Referral: impaired moblity, admission for stroke  Referred By: Db DAVID  Past Medical History Relevant to Rehab: AFib, HTN, alcoholism  Family/Caregiver Present: No  Prior to Session Communication: Bedside nurse  Patient Position Received: Bed, 3 rail up, Alarm on  Preferred Learning Style: kinesthetic, verbal, visual  General Comment: patient with R facial  droop/drooling. purewick and brief. tele, PIV.    Subjective   Precautions:  Precautions  Medical Precautions: Fall precautions  Vital Signs:       Objective   Pain:     Cognition:  Cognition  Impulsive: Mildly  Processing Speed: Delayed  Postural Control:       Activity Tolerance:  Activity Tolerance  Endurance: Tolerates 10 - 20 min exercise with multiple rests  Treatments:  Ambulation/Gait Training  Ambulation/Gait Training Performed: Yes  Ambulation/Gait Training 1  Surface 1: Level tile  Device 1: Rolling walker  Gait Support Devices: Gait belt  Assistance 1: Contact guard, Minimum assistance  Quality of Gait 1: Diminished heel strike, Decreased step length, Inconsistent stride length  Comments/Distance (ft) 1: 70ftx2    Outcome Measures:  [facility] Basic Mobility  Turning from your back to your side while in a flat bed without using bedrails: A little  Moving from lying on your back to sitting on the side of a flat bed without using bedrails: A little  Moving to and from bed to chair (including a wheelchair): A little  Standing up from a chair using your arms (e.g. wheelchair or bedside chair): A little  To walk in hospital room: A lot  Climbing 3-5 steps with railing: A lot  Basic Mobility - Total Score: 16    Education Documentation  No documentation found.  Education Comments  No comments found.        OP EDUCATION:       Encounter Problems       Encounter Problems (Active)       Balance       STG - Maintains dynamic standing balance with upper extremity support (Progressing)       Start:  12/31/23    Expected End:  01/14/24       INTERVENTIONS:  1. Practice standing with minimal Ax1 and LRAD support.  2. Educate patient about standing tolerance.  3. Educate patient about independence with gait, transfers, and ADL's.  4. Educate patient about use of assistive device.  5. Educate patient about self-directed care.         Goal 1 - Tinetti (Progressing)       Start:  12/31/23    Expected End:  01/14/24        Increase Tinetti DARIANA score to >24/28 to indicate low risk of falling.             Mobility       STG - Patient will ambulate (Progressing)       Start:  12/31/23    Expected End:  01/14/24       >70 feet x2 with LRAD and CGA support, demonstrating safe and steady pattern         STG - Patient will ascend and descend four to six stairs (Progressing)       Start:  12/31/23    Expected End:  01/14/24       With min A x1 and safe technique.            Pain - Adult          Transfers       STG - Patient will perform bed mobility (Progressing)       Start:  12/31/23    Expected End:  01/14/24       With CGA support and HOB flat to L and R         STG - Patient will transfer sit to and from stand (Progressing)       Start:  12/31/23    Expected End:  01/14/24       With CGA support and LRAD, demonstrating safe technique

## 2024-01-01 NOTE — PROGRESS NOTES
Zo Nunez is a 68 y.o. female on day 2 of admission presenting with Cerebrovascular accident (CVA), unspecified mechanism (CMS/HCC).    Subjective   No acute events overnight. Mild improvement with aphasia able to get out one or two words and shakes head yes or no appropriately.        Objective     VITALS  Blood pressure (!) 164/109, pulse 83, temperature 35.9 °C (96.7 °F), temperature source Temporal, resp. rate 18, weight 76.2 kg (167 lb 15.9 oz), SpO2 98 %.  Physical Exam  Vitals and nursing note reviewed.   Constitutional:       General: She is not in acute distress.     Appearance: Normal appearance. She is not toxic-appearing.   HENT:      Head: Normocephalic and atraumatic.   Eyes:      Extraocular Movements: Extraocular movements intact.      Conjunctiva/sclera: Conjunctivae normal.   Cardiovascular:      Rate and Rhythm: Normal rate and regular rhythm.      Heart sounds: Normal heart sounds.   Pulmonary:      Effort: Pulmonary effort is normal. No respiratory distress.      Breath sounds: Normal breath sounds. No wheezing.   Abdominal:      General: Abdomen is flat. Bowel sounds are normal.      Palpations: Abdomen is soft.   Skin:     Capillary Refill: Capillary refill takes less than 2 seconds.      Coloration: Skin is not jaundiced or pale.   Neurological:      Mental Status: She is alert.      Motor: Weakness (on the right side, both upper and lower, but improved) present.      Comments: Still with aphasia            Intake/Output last 3 Shifts:  I/O last 3 completed shifts:  In: 750 (9.8 mL/kg) [I.V.:750 (9.8 mL/kg)]  Out: 1351 (17.7 mL/kg) [Urine:1351 (0.5 mL/kg/hr)]  Weight: 76.2 kg     Relevant Results  Results for orders placed or performed during the hospital encounter of 12/30/23 (from the past 24 hour(s))   POCT GLUCOSE   Result Value Ref Range    POCT Glucose 108 (H) 74 - 99 mg/dL   POCT GLUCOSE   Result Value Ref Range    POCT Glucose 130 (H) 74 - 99 mg/dL   Basic Metabolic Panel   Result  Value Ref Range    Glucose 119 (H) 74 - 99 mg/dL    Sodium 140 136 - 145 mmol/L    Potassium 3.4 (L) 3.5 - 5.3 mmol/L    Chloride 106 98 - 107 mmol/L    Bicarbonate 23 21 - 32 mmol/L    Anion Gap 14 10 - 20 mmol/L    Urea Nitrogen 17 6 - 23 mg/dL    Creatinine 0.84 0.50 - 1.05 mg/dL    eGFR 75 >60 mL/min/1.73m*2    Calcium 8.9 8.6 - 10.3 mg/dL   CBC   Result Value Ref Range    WBC 7.0 4.4 - 11.3 x10*3/uL    nRBC 0.0 0.0 - 0.0 /100 WBCs    RBC 4.77 4.00 - 5.20 x10*6/uL    Hemoglobin 13.8 12.0 - 16.0 g/dL    Hematocrit 43.7 36.0 - 46.0 %    MCV 92 80 - 100 fL    MCH 28.9 26.0 - 34.0 pg    MCHC 31.6 (L) 32.0 - 36.0 g/dL    RDW 16.0 (H) 11.5 - 14.5 %    Platelets 188 150 - 450 x10*3/uL   POCT GLUCOSE   Result Value Ref Range    POCT Glucose 143 (H) 74 - 99 mg/dL       Imaging Results  Transthoracic Echo (TTE) Limited   Final Result      MR brain wo IV contrast   Final Result   Multiple areas acute ischemia are scattered through the left and to a   lesser degree right frontal lobe as noted above.        The head of the right caudate nucleus in the posterior aspect of the   right corona radiata have punctate old hemosiderin/mineral   depositions. No acute hemorrhage is noted.        The parenchymal hyperintensities elsewhere are nonspecific and may be   secondary to chronic microvascular ischemic change.        MACRO:   None        Signed by: Vishnu Husain 12/30/2023 2:04 PM   Dictation workstation:   [id number]      XR chest 1 view   Final Result   No acute cardiopulmonary disease.  No significant interval change from   the prior exam..   Signed by Larry James MD      CT angio brain attack neck w IV contrast and post procedure   Final Result   * Minor atherosclerotic changes in the cavernous and supraclinoid   portion of the internal carotid arteries. Without measurable luminal   narrowing. No evidence of intracranial aneurysm, vascular   malformation or branch occlusion.        MACRO:   none        Signed by: Sudeep  Yara 12/30/2023 10:44 AM   Dictation workstation:   [id number]      CT brain attack head wo IV contrast   Final Result   1. Subacute nonhemorrhagic infarct left posterior frontal parietal   region not seen on 03/22/2023.   2. Remote left thalamic infarct.        MACRO:   Arlin Washington discussed the significance and urgency of this   critical finding by telephone with  Dr. Velasquez on 12/30/2023 at   10:13 am.  (**-RCF-**) Findings:  See findings.        Signed by: Arlin Washington 12/30/2023 10:18 AM   Dictation workstation:   [id number]          Medications:  aspirin, 81 mg, oral, Daily  atorvastatin, 80 mg, oral, Nightly  carvedilol, 6.25 mg, oral, BID  enoxaparin, 40 mg, subcutaneous, q24h  levothyroxine, 25 mcg, oral, Daily  lisinopril, 40 mg, oral, Daily  pantoprazole, 40 mg, oral, Daily before breakfast  perflutren lipid microspheres, 0.5-10 mL of dilution, intravenous, Once in imaging  perflutren protein A microsphere, 0.5 mL, intravenous, Once in imaging  polyethylene glycol, 17 g, oral, Daily  sulfur hexafluoride microsphr, 2 mL, intravenous, Once in imaging       PRN medications: hydrALAZINE **FOLLOWED BY** hydrALAZINE, metoprolol, oxygen, sodium chloride        Assessment/Plan   Principal Problem:    Cerebrovascular accident (CVA), unspecified mechanism (CMS/HCC)    L sided CVA with aphasia and Right sided weakness, likely in the setting of medication non compliance   -CT with subacute L sided infarct   -CTA with atherosclerotic changes   -MRI with multiple areas of ischemia on the L side   -Limited echo to eval for clot as she has been non compliant with medications   -Consult neuro  -ASA  -Statin   -Speech eval, will ask them to come back and re eval so we can try to get some of her oral medications restarted   -PT/OT  -Neuro checks [id number]   -Permissive HTN allow for systolic <220 will try to start to bring her BP down to normal today if she can swallow pills.   -Tele      2. Hx of HTN  -Per  son appears not likely compliant with medications  -Will try to restart some of her blood pressure medications today if she can swallow   -As needed hydralazine      3. Atrial Fibrillation   -As needed IV metoprolol, unable to swallow oral medications   -Can restart Eliquis on 1/6/24     4. Hypothyroidism  -Restart thyroid medication once she can tolerate orals      5. Chronic anemia   -No s/s of bleeding   -Will monitor     DVT Prophylaxis:  Lovenox subq    Disposition:  Likely to [facility] once arrangements made and swallowing has been addressed    Raffaele Ace, [facility]

## 2024-01-01 NOTE — CARE PLAN
The patient's goals for the shift include      The clinical goals for the shift include Patient safety will be maintained throughout shift.      Problem: Fall/Injury  Goal: Not fall by end of shift  Outcome: Progressing  Goal: Be free from injury by end of the shift  Outcome: Progressing  Goal: Verbalize understanding of personal risk factors for fall in the hospital  Outcome: Progressing  Goal: Verbalize understanding of risk factor reduction measures to prevent injury from fall in the home  Outcome: Progressing  Goal: Use assistive devices by end of the shift  Outcome: Progressing  Goal: Pace activities to prevent fatigue by end of the shift  Outcome: Progressing     Problem: Skin  Goal: Decreased wound size/increased tissue granulation at next dressing change  Outcome: Progressing  Flowsheets (Taken 12/31/2023 1934)  Decreased wound size/increased tissue granulation at next dressing change: Protective dressings over bony prominences

## 2024-01-01 NOTE — PROGRESS NOTES
1/1/24 0936  Therapy recommending high intensity.  Spoke with patient regarding [facility].  She is in agreement.  With her permission, spoke to her son Bhavesh.  He is also in agreement with patient going to [facility].  [facility] is [facility].  Referral sent.  ADOD 1-2 days.  Madina Locke RN [facility]

## 2024-01-02 ENCOUNTER — APPOINTMENT (OUTPATIENT)
Dept: CARDIOLOGY | Facility: HOSPITAL | Age: 69
DRG: 065 | End: 2024-01-02
Payer: MEDICARE

## 2024-01-02 LAB
ANION GAP SERPL CALC-SCNC: 14 MMOL/L (ref 10–20)
BUN SERPL-MCNC: 13 MG/DL (ref 6–23)
CALCIUM SERPL-MCNC: 8.7 MG/DL (ref 8.6–10.3)
CHLORIDE SERPL-SCNC: 109 MMOL/L (ref 98–107)
CO2 SERPL-SCNC: 20 MMOL/L (ref 21–32)
CREAT SERPL-MCNC: 0.79 MG/DL (ref 0.5–1.05)
GFR SERPL CREATININE-BSD FRML MDRD: 81 ML/MIN/1.73M*2
GLUCOSE BLD MANUAL STRIP-MCNC: 120 MG/DL (ref 74–99)
GLUCOSE BLD MANUAL STRIP-MCNC: 129 MG/DL (ref 74–99)
GLUCOSE BLD MANUAL STRIP-MCNC: 133 MG/DL (ref 74–99)
GLUCOSE SERPL-MCNC: 119 MG/DL (ref 74–99)
POTASSIUM SERPL-SCNC: 3.5 MMOL/L (ref 3.5–5.3)
SODIUM SERPL-SCNC: 139 MMOL/L (ref 136–145)

## 2024-01-02 PROCEDURE — 97535 SELF CARE MNGMENT TRAINING: CPT | Mod: GO

## 2024-01-02 PROCEDURE — 97112 NEUROMUSCULAR REEDUCATION: CPT | Mod: GP,CQ

## 2024-01-02 PROCEDURE — 82374 ASSAY BLOOD CARBON DIOXIDE: CPT | Performed by: INTERNAL MEDICINE

## 2024-01-02 PROCEDURE — 92610 EVALUATE SWALLOWING FUNCTION: CPT | Mod: GN

## 2024-01-02 PROCEDURE — 96372 THER/PROPH/DIAG INJ SC/IM: CPT | Performed by: INTERNAL MEDICINE

## 2024-01-02 PROCEDURE — 99239 HOSP IP/OBS DSCHRG MGMT >30: CPT | Performed by: INTERNAL MEDICINE

## 2024-01-02 PROCEDURE — 2500000001 HC RX 250 WO HCPCS SELF ADMINISTERED DRUGS (ALT 637 FOR MEDICARE OP): Performed by: INTERNAL MEDICINE

## 2024-01-02 PROCEDURE — 93005 ELECTROCARDIOGRAM TRACING: CPT

## 2024-01-02 PROCEDURE — 97116 GAIT TRAINING THERAPY: CPT | Mod: GP,CQ

## 2024-01-02 PROCEDURE — 2500000004 HC RX 250 GENERAL PHARMACY W/ HCPCS (ALT 636 FOR OP/ED): Performed by: INTERNAL MEDICINE

## 2024-01-02 PROCEDURE — 82947 ASSAY GLUCOSE BLOOD QUANT: CPT

## 2024-01-02 PROCEDURE — 94760 N-INVAS EAR/PLS OXIMETRY 1: CPT

## 2024-01-02 PROCEDURE — 36415 COLL VENOUS BLD VENIPUNCTURE: CPT | Performed by: INTERNAL MEDICINE

## 2024-01-02 PROCEDURE — 97112 NEUROMUSCULAR REEDUCATION: CPT | Mod: GO

## 2024-01-02 PROCEDURE — 1200000002 HC GENERAL ROOM WITH TELEMETRY DAILY

## 2024-01-02 PROCEDURE — 2500000005 HC RX 250 GENERAL PHARMACY W/O HCPCS: Performed by: INTERNAL MEDICINE

## 2024-01-02 RX ORDER — NIFEDIPINE 30 MG/1
30 TABLET, FILM COATED, EXTENDED RELEASE ORAL
Status: DISCONTINUED | OUTPATIENT
Start: 2024-01-03 | End: 2024-01-02

## 2024-01-02 RX ORDER — AMLODIPINE BESYLATE 10 MG/1
10 TABLET ORAL DAILY
Status: DISCONTINUED | OUTPATIENT
Start: 2024-01-02 | End: 2024-01-03 | Stop reason: HOSPADM

## 2024-01-02 RX ADMIN — DEXTROSE AND SODIUM CHLORIDE 75 ML/HR: 5; 200 INJECTION, SOLUTION INTRAVENOUS at 12:01

## 2024-01-02 RX ADMIN — ATORVASTATIN CALCIUM 80 MG: 80 TABLET, FILM COATED ORAL at 21:19

## 2024-01-02 RX ADMIN — METOPROLOL TARTRATE 5 MG: 5 INJECTION INTRAVENOUS at 14:04

## 2024-01-02 RX ADMIN — ENOXAPARIN SODIUM 40 MG: 40 INJECTION SUBCUTANEOUS at 11:58

## 2024-01-02 RX ADMIN — CARVEDILOL 6.25 MG: 6.25 TABLET, FILM COATED ORAL at 21:19

## 2024-01-02 ASSESSMENT — COGNITIVE AND FUNCTIONAL STATUS - GENERAL
TOILETING: A LOT
CLIMB 3 TO 5 STEPS WITH RAILING: A LOT
WALKING IN HOSPITAL ROOM: A LITTLE
HELP NEEDED FOR BATHING: A LOT
PERSONAL GROOMING: A LITTLE
TOILETING: A LOT
DAILY ACTIVITIY SCORE: 12
MOBILITY SCORE: 18
MOVING TO AND FROM BED TO CHAIR: TOTAL
MOBILITY SCORE: 19
TURNING FROM BACK TO SIDE WHILE IN FLAT BAD: A LITTLE
TURNING FROM BACK TO SIDE WHILE IN FLAT BAD: TOTAL
HELP NEEDED FOR BATHING: A LOT
EATING MEALS: A LOT
DRESSING REGULAR UPPER BODY CLOTHING: A LITTLE
HELP NEEDED FOR BATHING: A LOT
MOVING TO AND FROM BED TO CHAIR: A LITTLE
MOBILITY SCORE: 9
STANDING UP FROM CHAIR USING ARMS: A LITTLE
MOBILITY SCORE: 9
EATING MEALS: A LOT
DRESSING REGULAR UPPER BODY CLOTHING: A LOT
STANDING UP FROM CHAIR USING ARMS: TOTAL
TOILETING: A LITTLE
CLIMB 3 TO 5 STEPS WITH RAILING: TOTAL
DRESSING REGULAR LOWER BODY CLOTHING: A LITTLE
WALKING IN HOSPITAL ROOM: A LITTLE
DRESSING REGULAR LOWER BODY CLOTHING: A LITTLE
MOVING TO AND FROM BED TO CHAIR: A LITTLE
DRESSING REGULAR UPPER BODY CLOTHING: A LOT
HELP NEEDED FOR BATHING: A LOT
PERSONAL GROOMING: A LITTLE
MOVING TO AND FROM BED TO CHAIR: A LITTLE
DRESSING REGULAR LOWER BODY CLOTHING: A LOT
HELP NEEDED FOR BATHING: A LOT
TOILETING: A LOT
TOILETING: A LOT
DAILY ACTIVITIY SCORE: 12
MOBILITY SCORE: 18
PERSONAL GROOMING: A LOT
MOBILITY SCORE: 18
PERSONAL GROOMING: A LOT
HELP NEEDED FOR BATHING: A LOT
DRESSING REGULAR LOWER BODY CLOTHING: A LOT
DAILY ACTIVITIY SCORE: 12
EATING MEALS: A LOT
DRESSING REGULAR LOWER BODY CLOTHING: A LOT
TURNING FROM BACK TO SIDE WHILE IN FLAT BAD: TOTAL
STANDING UP FROM CHAIR USING ARMS: A LITTLE
CLIMB 3 TO 5 STEPS WITH RAILING: A LOT
PERSONAL GROOMING: A LOT
DRESSING REGULAR LOWER BODY CLOTHING: A LOT
DAILY ACTIVITIY SCORE: 16
WALKING IN HOSPITAL ROOM: TOTAL
DRESSING REGULAR UPPER BODY CLOTHING: A LOT
DRESSING REGULAR UPPER BODY CLOTHING: A LOT
WALKING IN HOSPITAL ROOM: A LITTLE
TURNING FROM BACK TO SIDE WHILE IN FLAT BAD: A LITTLE
DAILY ACTIVITIY SCORE: 16
EATING MEALS: A LOT
STANDING UP FROM CHAIR USING ARMS: TOTAL
MOVING TO AND FROM BED TO CHAIR: A LITTLE
TOILETING: A LITTLE
DAILY ACTIVITIY SCORE: 12
TOILETING: A LOT
CLIMB 3 TO 5 STEPS WITH RAILING: A LOT
CLIMB 3 TO 5 STEPS WITH RAILING: TOTAL
STANDING UP FROM CHAIR USING ARMS: A LITTLE
EATING MEALS: A LOT
MOVING TO AND FROM BED TO CHAIR: TOTAL
MOBILITY SCORE: 19
WALKING IN HOSPITAL ROOM: TOTAL
DRESSING REGULAR UPPER BODY CLOTHING: A LOT
EATING MEALS: A LOT
CLIMB 3 TO 5 STEPS WITH RAILING: A LOT
CLIMB 3 TO 5 STEPS WITH RAILING: A LOT
WALKING IN HOSPITAL ROOM: A LITTLE
WALKING IN HOSPITAL ROOM: A LITTLE
EATING MEALS: A LOT
STANDING UP FROM CHAIR USING ARMS: A LITTLE
PERSONAL GROOMING: A LOT
DRESSING REGULAR LOWER BODY CLOTHING: A LOT
TURNING FROM BACK TO SIDE WHILE IN FLAT BAD: A LITTLE
PERSONAL GROOMING: A LOT
STANDING UP FROM CHAIR USING ARMS: A LITTLE
MOVING TO AND FROM BED TO CHAIR: A LITTLE
DAILY ACTIVITIY SCORE: 12
DRESSING REGULAR UPPER BODY CLOTHING: A LITTLE
HELP NEEDED FOR BATHING: A LOT

## 2024-01-02 ASSESSMENT — PAIN - FUNCTIONAL ASSESSMENT
PAIN_FUNCTIONAL_ASSESSMENT: 0-10

## 2024-01-02 ASSESSMENT — ACTIVITIES OF DAILY LIVING (ADL): HOME_MANAGEMENT_TIME_ENTRY: 30

## 2024-01-02 ASSESSMENT — PAIN SCALES - GENERAL
PAINLEVEL_OUTOF10: 0 - NO PAIN

## 2024-01-02 NOTE — PROGRESS NOTES
Occupational Therapy    Occupational Therapy Treatment    Name: Zo Nunez  MRN: [id number]  : 1955  Date: 24  Time Calculation  Start Time: 1522  Stop Time: 1600  Time Calculation (min): 38 min    Assessment:  End of Session Communication: Bedside nurse  End of Session Patient Position: Up in chair, Alarm off, not on at start of session (dtr present)  Plan:  Treatment Interventions: ADL retraining, Visual perceptual retraining, Functional transfer training, Endurance training, Cognitive reorientation, Patient/family training, Equipment evaluation/education, Neuromuscular reeducation, Fine motor coordination activities, Compensatory technique education  OT Frequency: 4 times per week  OT Discharge Recommendations: High intensity level of continued care  OT Recommended Transfer Status: Minimal assist, Assist of 1  OT - OK to Discharge: Yes    Subjective   Previous Visit Info:  OT Last Visit  OT Received On: 24  General:  General  Family/Caregiver Present: Yes  Caregiver Feedback: dtr  Precautions:  Medical Precautions: Fall precautions (expressive aphasia; NPO)  Vitals:  Vital Signs  Heart Rate: (!) 127 (after bathroom ADL activity)  Pain Assessment:  Pain Assessment  Pain Assessment: 0-10     Objective   Activities of Daily Living: Feeding  Feeding Comments: NPO-- pt is constantly drooling throughout tx session, requires occasional reminderes to wipe face    Grooming  Grooming Level of Assistance: Close supervision, Contact guard, Moderate verbal cues  Grooming Where Assessed: Standing sinkside (pt completed oral hygien while standing at sink. Cues to use R hand to remove cap from toothpaste, able to complete with increased time. Pt brushes teeth with R hand. Unable to purse lips to expel contents of mouth. able to wipe with wash cloth using RUE)    LE Dressing  LE Dressing: Yes  Adult Briefs Level of Assistance: Minimum assistance, Moderate verbal cues, Tactile cues (while seated on toilet;  Able to doff brief while seated on toliet; min assist to thread brief over R LE, cues for threading technique. Able to hike over hips once in standing with cga)    Toileting  Toileting Level of Assistance: Setup, Moderate verbal cues, Close supervision, Tactile cues, Contact guard  Where Assessed: Toilet  Toileting Comments: pt requires cues for sequecing toileting management. incontinent of urine through brief on arrival, seemingly unaware.able to perform blossom care while seated and in static standing with cga    Bed Mobility/Transfers: Bed Mobility  Bed Mobility: Yes  Bed Mobility 1  Bed Mobility 1: Supine to sitting  Level of Assistance 1: Close supervision, Minimal verbal cues    Transfers  Transfer: Yes  Transfer 1  Technique 1: Sit to stand, Stand to sit  Transfer Device 1: Walker  Transfer Level of Assistance 1: Contact guard  Trials/Comments 1: verbal cues for hand placement and sequencing.  Transfers 2  Transfer to 2: Toilet  Technique 2: Stand to sit, Sit to stand  Transfer Device 2: Walker (grab bar)  Transfer Level of Assistance 2: Minimal tactile cues, Moderate verbal cues, Contact guard    Ambulation/Gait Training:  Ambulation/Gait Training  Ambulation/Gait Training Performed: Yes  Ambulation/Gait Training 1  Device 1: Rolling walker  Assistance 1: Contact guard, Minimum assistance, Moderate verbal cues (cues for proximity within ww and hand placement location. ambulated to/from restroom with ww and stood at sink x10 min for ADLs/grooming)  Sitting Balance:  Static Sitting Balance  Static Sitting-Level of Assistance: Close supervision  Dynamic Sitting Balance  Dynamic Sitting-Balance:  (close supervision)  Standing Balance:  Static Standing Balance  Static Standing-Level of Assistance: Contact guard  Dynamic Standing Balance  Dynamic Standing-Balance:  (cga vs min assist)    Outcome Measures:  [facility] Daily Activity  Putting on and taking off regular lower body clothing: A little  Bathing (including  washing, rinsing, drying): A lot  Putting on and taking off regular upper body clothing: A little  Toileting, which includes using toilet, bedpan or urinal: A little  Taking care of personal grooming such as brushing teeth: A little  Eating Meals: A lot  Daily Activity - Total Score: 16      Education Documentation  ADL Training, taught by Chandler Fried OT at 1/2/2024  4:53 PM.  Learner: Family, Patient  Readiness: Acceptance  Method: Explanation  Response: Needs Reinforcement    Education Comments  No comments found.      Goals:  Encounter Problems       Encounter Problems (Active)       ADLs       Patient will perform UB and LB bathing with supervision level of assistance. (Progressing)       Start:  12/31/23    Expected End:  01/14/24            Patient with complete lower body dressing with supervision level of assistance donning and doffing all LE clothes (Progressing)       Start:  12/31/23    Expected End:  01/14/24            Patient will complete toileting including hygiene clothing management/hygiene with supervision level of assistance. (Progressing)       Start:  12/31/23    Expected End:  01/14/24               BALANCE       Pt will maintain dynamic standing balance during ADL task with supervision level of assistance in order to demonstrate decreased risk of falling and improved postural control. (Progressing)       Start:  12/31/23    Expected End:  01/14/24               TRANSFERS       Patient will complete functional transfers with least restrictive device with supervision level of assistance. (Progressing)       Start:  12/31/23    Expected End:  01/14/24

## 2024-01-02 NOTE — CARE PLAN
The patient's goals for the shift include      The clinical goals for the shift include Patient will remain safe throughout the shift.    Over the shift, the patient did not make progress toward the following goals. Barriers to progression include cognitive decline. Recommendations to address these barriers include intermittent reeducation.

## 2024-01-02 NOTE — CARE PLAN
Problem: Fall/Injury  Goal: Not fall by end of shift  Outcome: Progressing  Goal: Be free from injury by end of the shift  Outcome: Progressing  Goal: Verbalize understanding of personal risk factors for fall in the hospital  Outcome: Progressing  Goal: Verbalize understanding of risk factor reduction measures to prevent injury from fall in the home  Outcome: Progressing  Goal: Use assistive devices by end of the shift  Outcome: Progressing  Goal: Pace activities to prevent fatigue by end of the shift  Outcome: Progressing     Problem: Skin  Goal: Decreased wound size/increased tissue granulation at next dressing change  Outcome: Progressing  Goal: Participates in plan/prevention/treatment measures  Outcome: Progressing  Goal: Prevent/manage excess moisture  Outcome: Progressing  Goal: Prevent/minimize sheer/friction injuries  Outcome: Progressing  Flowsheets (Taken 1/2/2024 0320)  Prevent/minimize sheer/friction injuries:   HOB 30 degrees or less   Use pull sheet  Goal: Promote/optimize nutrition  Outcome: Progressing  Goal: Promote skin healing  Outcome: Progressing  Flowsheets (Taken 1/2/2024 0320)  Promote skin healing: Assess skin/pad under line(s)/device(s)     Problem: Pain - Adult  Goal: Verbalizes/displays adequate comfort level or baseline comfort level  Outcome: Progressing     Problem: Safety - Adult  Goal: Free from fall injury  Outcome: Progressing     Problem: Discharge Planning  Goal: Discharge to home or other facility with appropriate resources  Outcome: Progressing     Problem: Chronic Conditions and Co-morbidities  Goal: Patient's chronic conditions and co-morbidity symptoms are monitored and maintained or improved  Outcome: Progressing     Problem: General Stroke  Goal: Establish a mutual long term goal with patient by discharge  Outcome: Progressing  Goal: Demonstrate improvement in neurological exam throughout the shift  Outcome: Progressing  Goal: Maintain BP within ordered limits  throughout shift  Outcome: Progressing  Goal: Participate in treatment (ie., meds, therapy) throughout shift  Outcome: Progressing  Goal: No symptoms of aspiration throughout shift  Outcome: Progressing  Goal: No symptoms of hemorrhage throughout shift  Outcome: Progressing  Goal: Tolerate enteral feeding throughout shift  Outcome: Progressing  Goal: Decreased nausea/vomiting throughout shift  Outcome: Progressing  Goal: Controlled blood glucose throughout shift  Outcome: Progressing  Goal: Out of bed three times today  Outcome: Progressing     Problem: ICU Stroke  Goal: Maintain ICP within ordered limits throughout shift  Outcome: Progressing  Goal: Tolerate EVD clamping trial throughout shift  Outcome: Progressing  Goal: Tolerate ventilator weaning trial during shift  Outcome: Progressing  Goal: Maintain patent airway throughout shift  Outcome: Progressing  Goal: Achieve/maintain targeted sodium level throughout shift  Outcome: Progressing

## 2024-01-02 NOTE — PROGRESS NOTES
Speech-Language Pathology    Inpatient Speech-Language Pathology Clinical Swallow Evaluation    Patient Name: Zo Nunez  MRN: [id number]  Today's Date: 1/2/2024   Time Calculation  Start Time: 1708  Stop Time: 1741  Time Calculation (min): 33 min         Current Problem:   1. Cerebrovascular accident (CVA), unspecified mechanism (CMS/HCC)  Transthoracic Echo (TTE) Limited    Transthoracic Echo (TTE) Limited      2. Other specified transient cerebral ischemias  Transthoracic Echo (TTE) Limited    Transthoracic Echo (TTE) Limited      3. Other cerebrovascular disease  Transthoracic Echo (TTE) Limited            General info:  Risk for Aspiration: Yes  Additional Recommendations: Dysphagia treatment  Solid Diet Recommendations : Pureed/extremely thick  (IDDSI Level 4)  Liquid Diet Recommendations: Thin (IDDSI Level 0)  Compensatory Swallowing Strategies: Decrease distractions during eating/feeding, Upright 90 degrees as possible for all oral intake, Remain upright for 20-30 minutes after meals, Alternate solids and liquids, Swallow 2 times per bite/sip, Single sips, Small bites/sips, Eat/feed slowly, Check for pocketing of food  Oral Sensory Strategies: Change Texture  Medication Administration Recommendations: Crushed, With Pureed  Follow up treatments: Diet tolerance monitoring, Patient/family education  Dysphagia Goals: Patient will tolerate recommended diet without observed clinical signs of aspiration, Patient will demonstrate appropriate strategies for swallowing safety, Family will demonstrate appropriate strategies for swallowing safety, Patient will progress to advanced diet      Assessment:  Clinical swallow evaluation completed .  Pt alert, motivated to participate with swallow assessment. Noted expressive aphasia, using gestures with some success.  Several family members present for evaluation.   Pt consumed trials of thin liquids via straw, and pureed solids.  Pt self-administered PO items, was able to  follow directives throughout eval. No overt s/s aspiration noted on single spoon, and straw drinks of thin - single sips and successive swallows.  Vocal quality remained clear after the swallow with all trials. Adequate bolus formation, mildly prolonged oral phase/oral holding with PO items, but seemingly functional.      Recommendations:  Pureed solids, thin liquids  -upright posture for PO intake  -slow rate  -single bites/sips  -PO trials with other solid consistencies as appropriate  - If pt demonstrates s/s aspiration or develops worsening respiratory functioning, make NPO and contact SLP      General Visit Information:  Patient Class: Inpatient  Living Environment: Home  Arrival: Family/caregiver present  Caregiver Feedback: Family members present  Ordering Physician: Db  Reason for Referral: Concern for aspiration, aphasic  Referred By: RN  Past Medical History Relevant to Rehab: AFib, HTN, alcoholism  Prior Level of Function: WFL  Developmental Status: Age Appropriate  Patient Seen During This Visit: Yes  Prior to Session Communication: Bedside nurse  Reviewed Procedures and Risks: Yes  Date of Onset: 12/30/23  Date of Order: 12/30/23  [employer] Diet: Regular  Current Diet : NPO  Dysphagia Diagnosis: Mild oral stage dysphagia, Mild pharyngeal stage dysphagia         Pain:  Pain Assessment: 0-10  Pain Score: 0 - No pain       Oral/Motor Assessment:  Dentition: Adequate/Natural  Oral Motor: Within Functional Limits  Labial Agility: Within Functional Limits  Labial Symmetry: Within Functional Limits  Vocal Quality: Within Functional Limits  Breath Support: Adequate for speech  Hearing: Within Functional Limits      Consistencies Trialed:  Consistencies Trialed: Yes  Consistencies Trialed: Thin (IDDSI Level 0) - Straw, Pureed/extremely thick (IDDSI Level 4)      Clinical Observations:  Patient Positioning: Upright in Bed  Management of Oral Secretions: Adequate  Was The 3 oz Swallow Protocol Completed:  Yes  Prolonged Oral Manipulation: Thin (IDDSI Level 0) - Straw  Clinical Observation Comment: PO trial completed with thin via straw (water), and pureed solids (applesauce). Pt was able to follow simple commands, was able to self-administer PO items. No overt s/s of aspiration, clear vocal quality after the swallow with all trials.      Plan:  Dysphagia therapy frequency: 3x/week  Prognosis: Good  Medical Staff Made Aware: Yes  Strengths: Motivation, Family/Caregiver Suppport  Barriers: Comorbidities      Inpatient Education:  Patient/family educated on role of SLP  Family education results gave verbal understanding      Goals:   Patient will tolerate recommended diet without observed clinical signs of aspiration  Patient/family will demonstrate appropriate strategies for swallowing safety   Patient will progress to advanced diet as tolerated

## 2024-01-02 NOTE — CARE PLAN
Problem: Fall/Injury  Goal: Not fall by end of shift  1/2/2024 1258 by Dae Kauffman RN  Outcome: Progressing  1/2/2024 1257 by Dae Kauffman RN  Outcome: Progressing  Goal: Be free from injury by end of the shift  1/2/2024 1258 by Dae Kauffman RN  Outcome: Progressing  1/2/2024 1257 by Dae Kauffman RN  Outcome: Progressing  Goal: Verbalize understanding of personal risk factors for fall in the hospital  1/2/2024 1258 by Dae Kauffman RN  Outcome: Progressing  1/2/2024 1257 by Dae Kauffman RN  Outcome: Progressing  Goal: Verbalize understanding of risk factor reduction measures to prevent injury from fall in the home  1/2/2024 1258 by Dae Kauffman RN  Outcome: Progressing  1/2/2024 1257 by Dae Kauffman RN  Outcome: Progressing  Goal: Use assistive devices by end of the shift  1/2/2024 1258 by Dae Kauffman RN  Outcome: Progressing  1/2/2024 1257 by Dae Kauffman RN  Outcome: Progressing  Goal: Pace activities to prevent fatigue by end of the shift  1/2/2024 1258 by Dae Kauffman RN  Outcome: Progressing  1/2/2024 1257 by Dae Kauffman RN  Outcome: Progressing     Problem: Skin  Goal: Decreased wound size/increased tissue granulation at next dressing change  1/2/2024 1258 by Dae Kauffman RN  Outcome: Progressing  1/2/2024 1257 by Dae Kauffman RN  Outcome: Progressing  Goal: Participates in plan/prevention/treatment measures  1/2/2024 1258 by Dae Kauffman RN  Outcome: Progressing  1/2/2024 1257 by Dae Kauffman RN  Outcome: Progressing  Goal: Prevent/manage excess moisture  1/2/2024 1258 by Dae Kauffman RN  Outcome: Progressing  1/2/2024 1257 by Dae Kauffman RN  Outcome: Progressing  Goal: Prevent/minimize sheer/friction injuries  1/2/2024 1258 by Dae Kauffman RN  Outcome: Progressing  1/2/2024 1257 by Dae Kauffman RN  Outcome: Progressing  Goal: Promote/optimize nutrition  1/2/2024 1258 by Dae Kauffman RN  Outcome: Progressing  1/2/2024 1257 by Dae Kauffman RN  Outcome:  Progressing  Goal: Promote skin healing  1/2/2024 1258 by Dae Kauffman RN  Outcome: Progressing  1/2/2024 1257 by Dae Kauffman RN  Outcome: Progressing     Problem: Pain - Adult  Goal: Verbalizes/displays adequate comfort level or baseline comfort level  1/2/2024 1258 by Dae Kauffman RN  Outcome: Progressing  1/2/2024 1257 by Dae Kauffman RN  Outcome: Progressing     Problem: Safety - Adult  Goal: Free from fall injury  1/2/2024 1258 by Dae Kauffman RN  Outcome: Progressing  1/2/2024 1257 by Dae Kauffman RN  Outcome: Progressing     Problem: Discharge Planning  Goal: Discharge to home or other facility with appropriate resources  1/2/2024 1258 by Dae Kauffman RN  Outcome: Progressing  1/2/2024 1257 by Dae Kauffman RN  Outcome: Progressing     Problem: Chronic Conditions and Co-morbidities  Goal: Patient's chronic conditions and co-morbidity symptoms are monitored and maintained or improved  1/2/2024 1258 by Dae Kauffman RN  Outcome: Progressing  1/2/2024 1257 by Dae Kauffman RN  Outcome: Progressing     Problem: General Stroke  Goal: Establish a mutual long term goal with patient by discharge  1/2/2024 1258 by Dae Kauffman RN  Outcome: Progressing  1/2/2024 1257 by Dae Kauffman RN  Outcome: Progressing  Goal: Demonstrate improvement in neurological exam throughout the shift  1/2/2024 1258 by Dae Kauffman RN  Outcome: Progressing  1/2/2024 1257 by Dae Kauffman RN  Outcome: Progressing  Goal: Maintain BP within ordered limits throughout shift  1/2/2024 1258 by Dae Kauffman RN  Outcome: Progressing  1/2/2024 1257 by Dae Kauffman RN  Outcome: Progressing  Goal: Participate in treatment (ie., meds, therapy) throughout shift  1/2/2024 1258 by Dae Kauffman RN  Outcome: Progressing  1/2/2024 1257 by Dae Kauffman RN  Outcome: Progressing  Goal: No symptoms of aspiration throughout shift  1/2/2024 1258 by Dae Kauffman RN  Outcome: Progressing  1/2/2024 1257 by Dae Kauffman RN  Outcome:  Progressing  Goal: No symptoms of hemorrhage throughout shift  1/2/2024 1258 by Dae Kauffman RN  Outcome: Progressing  1/2/2024 1257 by Dae Kauffman RN  Outcome: Progressing  Goal: Tolerate enteral feeding throughout shift  1/2/2024 1258 by Dae Kauffman RN  Outcome: Progressing  1/2/2024 1257 by Dae Kauffman RN  Outcome: Progressing  Goal: Decreased nausea/vomiting throughout shift  1/2/2024 1258 by Dae Kauffman RN  Outcome: Progressing  1/2/2024 1257 by Dae Kauffman RN  Outcome: Progressing  Goal: Controlled blood glucose throughout shift  1/2/2024 1258 by Dae Kauffman RN  Outcome: Progressing  1/2/2024 1257 by Dae Kauffman RN  Outcome: Progressing  Goal: Out of bed three times today  1/2/2024 1258 by Dae Kauffman RN  Outcome: Progressing  1/2/2024 1257 by Dae Kauffman RN  Outcome: Progressing     Problem: ICU Stroke  Goal: Maintain ICP within ordered limits throughout shift  1/2/2024 1258 by Dae Kauffman RN  Outcome: Progressing  1/2/2024 1257 by Dae Kauffman RN  Outcome: Progressing  Goal: Tolerate EVD clamping trial throughout shift  1/2/2024 1258 by Dae Kauffman RN  Outcome: Progressing  1/2/2024 1257 by Dae Kauffman RN  Outcome: Progressing  Goal: Tolerate ventilator weaning trial during shift  1/2/2024 1258 by Dae Kauffman RN  Outcome: Progressing  1/2/2024 1257 by Dae Kauffman RN  Outcome: Progressing  Goal: Maintain patent airway throughout shift  1/2/2024 1258 by Dae Kauffman RN  Outcome: Progressing  1/2/2024 1257 by Dae Kauffman RN  Outcome: Progressing  Goal: Achieve/maintain targeted sodium level throughout shift  1/2/2024 1258 by Dae Kauffman RN  Outcome: Progressing  1/2/2024 1257 by Dae Kauffman RN  Outcome: Progressing   The patient's goals for the shift include      The clinical goals for the shift include Patient will remain safe throughout the shift.

## 2024-01-02 NOTE — PROGRESS NOTES
Physical Therapy    Physical Therapy Treatment    Patient Name: Zo Nunez  MRN: [id number]  Today's Date: 1/2/2024  Time Calculation  Start Time: 1328  Stop Time: 1400  Time Calculation (min): 32 min       Assessment/Plan   PT Assessment  End of Session Communication: Bedside nurse (Discussed pt's progress and current mobility as described in mobility section)  End of Session Patient Position: Up in chair, Alarm on (Daughter present)  PT Plan  Inpatient/Swing Bed or Outpatient: Inpatient  PT Plan  Treatment/Interventions: Bed mobility, Transfer training, Gait training, Stair training, Balance training, Neuromuscular re-education, Strengthening, Endurance training, Range of motion, Therapeutic exercise, Therapeutic activity, Home exercise program, Positioning, Postural re-education  PT Plan: Skilled PT  PT Frequency: 4 times per week  PT Discharge Recommendations: High intensity level of continued care  Equipment Recommended upon Discharge: Wheeled walker  PT Recommended Transfer Status: Assist x1, Assistive device  PT - OK to Discharge: Yes (per POC)      General Visit Information:   PT  Visit  PT Received On: 01/02/24  General  Reason for Referral: impaired moblity, admission for stroke  Past Medical History Relevant to Rehab: AFib, HTN, alcoholism  Prior to Session Communication: Bedside nurse (RN cleared pt. for PT Tx. All charts reviewed. Per handoff with nursing prior to therapy visit, patient’s pain and vitals are stable. Additional concern for this patient related to therapy session: none.)  Patient Position Received: Bed, 3 rail up, Alarm on  General Comment: Pt supine in bed on arrival, daughter present. Tele in place and IV line running. Pt able to understand all commands but cannot effectively communicate needs/wants.    Subjective   Precautions:     Vital Signs:  Vital Signs  Heart Rate: (!) 133 (with activity)    Objective   Pain:  Pain Assessment  Pain Assessment: 0-10  Pain Score: 0 - No  pain    Treatments:  Therapeutic Exercise  Therapeutic Exercise Performed: Yes  Therapeutic Exercise Activity 1: Pt instructed in unsupported sitting B LE ther-ex: x10 reps AP, LAQ, Hip flexion, Hip ABD. Rest as needed to manage fatigue. Done to improve muscular strength and endurance to facilitate increased independence with balance, transfers, ambulation, and participation in ADLs. Verbal/nonverbal (demo/gestures) cuing for execution of exercises and for proper form to obtain maximal benefits.    Therapeutic Activity  Therapeutic Activity Performed: Yes  Therapeutic Activity 1: For increased sitting balance, core strength and functional endurance needed for increased independence with mobility and transfers, pt. performs unsupported sitting at EOB x10 min. duration with CGA x1 for balance, max safety and maintenance of midline orientation. Pt. demo's fair+ balance throughout.    Balance/Neuromuscular Re-Education  Balance/Neuromuscular Re-Education Activity Performed: Yes  Balance/Neuromuscular Re-Education Activity 1: For challenges to balance and increased standing tolerance needed for increased independence with functional mobility, pt. performs static/dynamic standing x 4 min. duration with CGA x1 for balance and max safety x2 trials with seated rest between to manage fatigue. Pt. instructed in B UE reaching in all planes and across midline for self-imposed challenges to balance. Verbal cuing throughout for technique and progressed the task by removing FWW for UE support. Pt has increased difficutly with balance when UE support was removed.    Bed Mobility  Bed Mobility: Yes  Bed Mobility 1  Bed Mobility 1: Supine to sitting  Level of Assistance 1: Contact guard  Bed Mobility Comments 1: bed flat    Ambulation/Gait Training  Ambulation/Gait Training Performed: Yes  Ambulation/Gait Training 1  Surface 1: Level tile  Device 1: Rolling walker  Gait Support Devices: Gait belt  Assistance 1: Contact guard  Quality  of Gait 1:  (Step-thru pattern with decreased foot clearance in swing and decreased step length. Fairly steady throughout with slowed pacing.)  Comments/Distance (ft) 1: 40'  Transfers  Transfer: Yes  Transfer 1  Transfer From 1: Sit to, Stand to  Transfer to 1: Stand, Sit  Technique 1: Sit to stand, Stand to sit  Transfer Device 1: Walker, Gait belt  Transfer Level of Assistance 1: Contact guard, Moderate verbal cues  Trials/Comments 1: Verbal cuing for strategic hand placement for sit<>stand.    Outcome Measures:  [facility] Basic Mobility  Turning from your back to your side while in a flat bed without using bedrails: None  Moving from lying on your back to sitting on the side of a flat bed without using bedrails: A little  Moving to and from bed to chair (including a wheelchair): A little  Standing up from a chair using your arms (e.g. wheelchair or bedside chair): A little  To walk in hospital room: A little  Climbing 3-5 steps with railing: A lot  Basic Mobility - Total Score: 18    Education Documentation  Precautions, taught by Janelle Rodriguez PTA at 1/2/2024  2:20 PM.  Learner: Patient  Readiness: Acceptance  Method: Explanation  Response: Needs Reinforcement    Body Mechanics, taught by Janelle Rodriguez PTA at 1/2/2024  2:20 PM.  Learner: Patient  Readiness: Acceptance  Method: Explanation  Response: Needs Reinforcement    Home Exercise Program, taught by Janelle Rodriguez PTA at 1/2/2024  2:20 PM.  Learner: Patient  Readiness: Acceptance  Method: Explanation  Response: Needs Reinforcement    Mobility Training, taught by Janelle Rodriguez PTA at 1/2/2024  2:20 PM.  Learner: Patient  Readiness: Acceptance  Method: Explanation  Response: Needs Reinforcement    Education Comments  No comments found.        OP EDUCATION:       Encounter Problems       Encounter Problems (Active)       Balance       STG - Maintains dynamic standing balance with upper extremity support (Progressing)       Start:  12/31/23    Expected End:  01/14/24        INTERVENTIONS:  1. Practice standing with minimal Ax1 and LRAD support.  2. Educate patient about standing tolerance.  3. Educate patient about independence with gait, transfers, and ADL's.  4. Educate patient about use of assistive device.  5. Educate patient about self-directed care.         Goal 1 - Tinetti (Progressing)       Start:  12/31/23    Expected End:  01/14/24       Increase Tinetti DARIANA score to >24/28 to indicate low risk of falling.             Mobility       STG - Patient will ambulate (Progressing)       Start:  12/31/23    Expected End:  01/14/24       >70 feet x2 with LRAD and CGA support, demonstrating safe and steady pattern         STG - Patient will ascend and descend four to six stairs (Progressing)       Start:  12/31/23    Expected End:  01/14/24       With min A x1 and safe technique.            Pain - Adult          Transfers       STG - Patient will perform bed mobility (Progressing)       Start:  12/31/23    Expected End:  01/14/24       With CGA support and HOB flat to L and R         STG - Patient will transfer sit to and from stand (Progressing)       Start:  12/31/23    Expected End:  01/14/24       With CGA support and LRAD, demonstrating safe technique

## 2024-01-02 NOTE — CARE PLAN
"SW was asked by  nurse to meet with  dtbianca Lambert at  Petra's request to discuss guardianship.     Pt  will beg   getting  dc to  [facility].     SW went up to  room - Petra not present. Her  phone number is not  in chart.     Pt   attempted to communicate with this  writer although aphasia made it challenging. Pt  declined  to use  letter board to communicate. She was handed a  pen but would not  write. She did point to the word \"intellectual\".     SW  explained to her  as well as the pt's sisters that came into the  room that there are different  options  for healthcare decision making proxy  - HPOA v guardianship. Pt  would need to be further  assessed tho for her ability to communicate her  wishes  by rehab  as pt is not  able to express them  currently but appears A+Ox3  to this  writer.     Paperwork on guardianship v HPOA  left at bedside  for Petra. Sisters  expressed appreciation for information, says that they will discuss  with Petra and pt  too.     Inga SALDIVARW      "

## 2024-01-02 NOTE — DISCHARGE SUMMARY
Discharge Diagnosis  1.  Acute ischemic stroke multifocal embolic, likely secondary to A-fib which she is not anticoagulated for.,  Aspirin until the start of Eliquis on January 6  Visual deficits 4 out of 5 right upper extremity weakness and right lower extremity weakness and severe dysarthria    Issues Requiring Follow-Up  neuro    Discharge Meds     Your medication list        ASK your doctor about these medications        Instructions Last Dose Given Next Dose Due   albuterol 90 mcg/actuation inhaler           amLODIPine 10 mg tablet  Commonly known as: Norvasc           aspirin 81 mg chewable tablet           Benadryl Allergy 25 mg tablet  Generic drug: diphenhydrAMINE           carvedilol 25 mg tablet  Commonly known as: Coreg           carvedilol 6.25 mg tablet  Commonly known as: Coreg           cyanocobalamin 1,000 mcg/mL injection  Commonly known as: Vitamin B-12           cyclobenzaprine 5 mg tablet  Commonly known as: Flexeril           ergocalciferol 1.25 MG (69070 UT) capsule  Commonly known as: Vitamin D-2           Fish OiL 340-1,000 mg capsule  Generic drug: omega-3 fatty acids-fish oil           fluticasone 50 mcg/actuation nasal spray  Commonly known as: Flonase           levothyroxine 25 mcg tablet  Commonly known as: Synthroid, Levoxyl           lisinopril 40 mg tablet           metoprolol succinate XL 50 mg 24 hr tablet  Commonly known as: Toprol-XL           montelukast 10 mg tablet  Commonly known as: Singulair           NIFEdipine ER 30 mg 24 hr tablet  Commonly known as: Adalat CC           omeprazole 40 mg DR capsule  Commonly known as: PriLOSEC           sodium chloride 0.65 % nasal spray  Commonly known as: Ocean           spironolacton-hydrochlorothiaz 25-25 mg tablet  Commonly known as: Aldactazide                    Test Results Pending At Discharge  Pending Labs       No current pending labs.            Hospital Course   Is a very pleasant 68-year-old female presented with aphasia  right-sided weakness.  Multiple embolic strokes were noted likely source cardioembolic from A-fib.  Patient previously has refused anticoagulation.  For now neurology has recommended continue aspirin through January 5 January 6 can be started on Eliquis or Xarelto.  Patient's deficits at the time of discharge are right upper extremity right lower extremity weakness along with a severe expressive aphasia. Patient was seen by speech had mild dysphagia they recommended Pureed solids, thin liquids.    Patient was set to be discharged on January 2 but The patient due to elevated blood pressures resumed home medications.  Blood pressures improved can be discharged today    Pertinent Physical Exam At Time of Discharge  Physical Exam  Constitutional:       Appearance: Normal appearance.   HENT:      Head: Normocephalic.      Nose: Nose normal.   Cardiovascular:      Rate and Rhythm: Normal rate.      Pulses: Normal pulses.   Abdominal:      General: Abdomen is flat. Bowel sounds are normal.      Palpations: Abdomen is soft.   Musculoskeletal:         General: Normal range of motion.      Cervical back: Normal range of motion.   Skin:     General: Skin is warm and dry.      Capillary Refill: Capillary refill takes less than 2 seconds.   Neurological:      Mental Status: She is alert.      Comments: Rue and rle weakness 4/5, exressive ahasia           Outpatient Follow-Up  No future appointments.      Charlee Wolfe MD

## 2024-01-03 ENCOUNTER — PHARMACY VISIT (OUTPATIENT)
Dept: PHARMACY | Facility: CLINIC | Age: 69
End: 2024-01-03
Payer: MEDICARE

## 2024-01-03 VITALS
BODY MASS INDEX: 27.99 KG/M2 | SYSTOLIC BLOOD PRESSURE: 146 MMHG | TEMPERATURE: 98.2 F | WEIGHT: 167.99 LBS | HEART RATE: 69 BPM | RESPIRATION RATE: 16 BRPM | OXYGEN SATURATION: 99 % | HEIGHT: 65 IN | DIASTOLIC BLOOD PRESSURE: 92 MMHG

## 2024-01-03 LAB
GLUCOSE BLD MANUAL STRIP-MCNC: 140 MG/DL (ref 74–99)
GLUCOSE BLD MANUAL STRIP-MCNC: 148 MG/DL (ref 74–99)

## 2024-01-03 PROCEDURE — RXMED WILLOW AMBULATORY MEDICATION CHARGE

## 2024-01-03 PROCEDURE — 82947 ASSAY GLUCOSE BLOOD QUANT: CPT

## 2024-01-03 PROCEDURE — 2500000004 HC RX 250 GENERAL PHARMACY W/ HCPCS (ALT 636 FOR OP/ED): Performed by: INTERNAL MEDICINE

## 2024-01-03 PROCEDURE — 96372 THER/PROPH/DIAG INJ SC/IM: CPT | Performed by: INTERNAL MEDICINE

## 2024-01-03 PROCEDURE — 2500000001 HC RX 250 WO HCPCS SELF ADMINISTERED DRUGS (ALT 637 FOR MEDICARE OP): Performed by: INTERNAL MEDICINE

## 2024-01-03 RX ORDER — NAPROXEN SODIUM 220 MG/1
81 TABLET, FILM COATED ORAL DAILY
Qty: 2 TABLET | Refills: 0 | Status: ON HOLD
Start: 2024-01-03 | End: 2024-01-10

## 2024-01-03 RX ORDER — ATORVASTATIN CALCIUM 80 MG/1
80 TABLET, FILM COATED ORAL NIGHTLY
Qty: 30 TABLET | Refills: 0 | Status: SHIPPED | OUTPATIENT
Start: 2024-01-03 | End: 2024-02-27 | Stop reason: SDUPTHER

## 2024-01-03 RX ADMIN — DEXTROSE AND SODIUM CHLORIDE 75 ML/HR: 5; 200 INJECTION, SOLUTION INTRAVENOUS at 02:01

## 2024-01-03 RX ADMIN — PANTOPRAZOLE SODIUM 40 MG: 40 TABLET, DELAYED RELEASE ORAL at 06:01

## 2024-01-03 RX ADMIN — ASPIRIN 81 MG CHEWABLE TABLET 81 MG: 81 TABLET CHEWABLE at 08:32

## 2024-01-03 RX ADMIN — ENOXAPARIN SODIUM 40 MG: 40 INJECTION SUBCUTANEOUS at 11:22

## 2024-01-03 RX ADMIN — CARVEDILOL 6.25 MG: 6.25 TABLET, FILM COATED ORAL at 08:32

## 2024-01-03 RX ADMIN — LEVOTHYROXINE SODIUM 25 MCG: 50 TABLET ORAL at 06:01

## 2024-01-03 RX ADMIN — LISINOPRIL 40 MG: 20 TABLET ORAL at 08:32

## 2024-01-03 RX ADMIN — AMLODIPINE BESYLATE 10 MG: 10 TABLET ORAL at 08:32

## 2024-01-03 RX ADMIN — POLYETHYLENE GLYCOL 3350 17 G: 17 POWDER, FOR SOLUTION ORAL at 08:32

## 2024-01-03 ASSESSMENT — COGNITIVE AND FUNCTIONAL STATUS - GENERAL
CLIMB 3 TO 5 STEPS WITH RAILING: A LOT
WALKING IN HOSPITAL ROOM: A LITTLE
TOILETING: A LITTLE
TOILETING: A LITTLE
DRESSING REGULAR UPPER BODY CLOTHING: A LITTLE
DRESSING REGULAR LOWER BODY CLOTHING: A LITTLE
EATING MEALS: A LOT
EATING MEALS: A LOT
STANDING UP FROM CHAIR USING ARMS: A LITTLE
TURNING FROM BACK TO SIDE WHILE IN FLAT BAD: A LITTLE
EATING MEALS: A LOT
MOBILITY SCORE: 18
WALKING IN HOSPITAL ROOM: A LITTLE
MOBILITY SCORE: 18
DAILY ACTIVITIY SCORE: 16
MOVING TO AND FROM BED TO CHAIR: A LITTLE
DRESSING REGULAR LOWER BODY CLOTHING: A LITTLE
MOBILITY SCORE: 18
DRESSING REGULAR UPPER BODY CLOTHING: A LITTLE
HELP NEEDED FOR BATHING: A LOT
MOVING TO AND FROM BED TO CHAIR: A LITTLE
EATING MEALS: A LOT
PERSONAL GROOMING: A LITTLE
DAILY ACTIVITIY SCORE: 16
TOILETING: A LITTLE
MOVING TO AND FROM BED TO CHAIR: A LITTLE
DRESSING REGULAR LOWER BODY CLOTHING: A LITTLE
TOILETING: A LITTLE
DRESSING REGULAR UPPER BODY CLOTHING: A LITTLE
TURNING FROM BACK TO SIDE WHILE IN FLAT BAD: A LITTLE
CLIMB 3 TO 5 STEPS WITH RAILING: A LOT
HELP NEEDED FOR BATHING: A LOT
PERSONAL GROOMING: A LITTLE
PERSONAL GROOMING: A LITTLE
HELP NEEDED FOR BATHING: A LOT
MOBILITY SCORE: 18
MOVING TO AND FROM BED TO CHAIR: A LITTLE
DAILY ACTIVITIY SCORE: 16
DRESSING REGULAR LOWER BODY CLOTHING: A LITTLE
CLIMB 3 TO 5 STEPS WITH RAILING: A LOT
WALKING IN HOSPITAL ROOM: A LITTLE
TURNING FROM BACK TO SIDE WHILE IN FLAT BAD: A LITTLE
CLIMB 3 TO 5 STEPS WITH RAILING: A LOT
HELP NEEDED FOR BATHING: A LOT
TURNING FROM BACK TO SIDE WHILE IN FLAT BAD: A LITTLE
STANDING UP FROM CHAIR USING ARMS: A LITTLE
DAILY ACTIVITIY SCORE: 16
WALKING IN HOSPITAL ROOM: A LITTLE
STANDING UP FROM CHAIR USING ARMS: A LITTLE
PERSONAL GROOMING: A LITTLE
DRESSING REGULAR UPPER BODY CLOTHING: A LITTLE
STANDING UP FROM CHAIR USING ARMS: A LITTLE

## 2024-01-03 ASSESSMENT — COLUMBIA-SUICIDE SEVERITY RATING SCALE - C-SSRS
1. IN THE PAST MONTH, HAVE YOU WISHED YOU WERE DEAD OR WISHED YOU COULD GO TO SLEEP AND NOT WAKE UP?: NO
2. HAVE YOU ACTUALLY HAD ANY THOUGHTS OF KILLING YOURSELF?: NO
6. HAVE YOU EVER DONE ANYTHING, STARTED TO DO ANYTHING, OR PREPARED TO DO ANYTHING TO END YOUR LIFE?: NO

## 2024-01-03 ASSESSMENT — ACTIVITIES OF DAILY LIVING (ADL): LACK_OF_TRANSPORTATION: NO

## 2024-01-03 ASSESSMENT — PAIN SCALES - GENERAL
PAINLEVEL_OUTOF10: 0 - NO PAIN
PAINLEVEL_OUTOF10: 0 - NO PAIN

## 2024-01-03 ASSESSMENT — PAIN - FUNCTIONAL ASSESSMENT
PAIN_FUNCTIONAL_ASSESSMENT: 0-10
PAIN_FUNCTIONAL_ASSESSMENT: 0-10

## 2024-01-03 NOTE — CARE PLAN
Problem: Fall/Injury  Goal: Not fall by end of shift  1/3/2024 1356 by Dae Kauffman RN  Outcome: Progressing  1/3/2024 1130 by Dae Kauffman RN  Outcome: Progressing  1/3/2024 1128 by Dae Kauffman RN  Outcome: Progressing  Goal: Be free from injury by end of the shift  1/3/2024 1356 by Dae Kauffman RN  Outcome: Progressing  1/3/2024 1130 by Dae Kauffman RN  Outcome: Progressing  1/3/2024 1128 by Dae Kauffman RN  Outcome: Progressing  Goal: Verbalize understanding of personal risk factors for fall in the hospital  1/3/2024 1356 by Dae Kauffman RN  Outcome: Progressing  1/3/2024 1130 by Dae Kauffman RN  Outcome: Progressing  1/3/2024 1128 by Dae Kauffman RN  Outcome: Progressing  Goal: Verbalize understanding of risk factor reduction measures to prevent injury from fall in the home  1/3/2024 1356 by Dae Kauffman RN  Outcome: Progressing  1/3/2024 1130 by Dae Kauffman RN  Outcome: Progressing  1/3/2024 1128 by Dae Kauffman RN  Outcome: Progressing  Goal: Use assistive devices by end of the shift  1/3/2024 1356 by Dae Kauffman RN  Outcome: Progressing  1/3/2024 1130 by Dae Kauffman RN  Outcome: Progressing  1/3/2024 1128 by Dae Kauffman RN  Outcome: Progressing  Goal: Pace activities to prevent fatigue by end of the shift  1/3/2024 1356 by Dae Kauffman RN  Outcome: Progressing  1/3/2024 1130 by Dae Kauffman RN  Outcome: Progressing  1/3/2024 1128 by Dae Kauffman RN  Outcome: Progressing     Problem: Skin  Goal: Decreased wound size/increased tissue granulation at next dressing change  1/3/2024 1356 by Dae Kauffman RN  Outcome: Progressing  1/3/2024 1130 by Dae Kauffman RN  Outcome: Progressing  1/3/2024 1128 by Dae Kauffman RN  Outcome: Progressing  Goal: Participates in plan/prevention/treatment measures  1/3/2024 1356 by Dae Kauffman RN  Outcome: Progressing  1/3/2024 1130 by Dae Kauffman RN  Outcome: Progressing  1/3/2024 1128 by Dae Kauffman RN  Outcome:  Progressing  Goal: Prevent/manage excess moisture  1/3/2024 1356 by Dae Kauffman RN  Outcome: Progressing  1/3/2024 1130 by Dae Kauffman RN  Outcome: Progressing  1/3/2024 1128 by Dae Kauffman RN  Outcome: Progressing  Goal: Prevent/minimize sheer/friction injuries  1/3/2024 1356 by Dae Kauffman RN  Outcome: Progressing  1/3/2024 1130 by Dae Kauffman RN  Outcome: Progressing  1/3/2024 1128 by Dae Kauffman RN  Outcome: Progressing  Goal: Promote/optimize nutrition  1/3/2024 1356 by Dae Kauffman RN  Outcome: Progressing  1/3/2024 1130 by Dae Kauffman RN  Outcome: Progressing  1/3/2024 1128 by Dae Kauffman RN  Outcome: Progressing  Goal: Promote skin healing  1/3/2024 1356 by Dae Kauffman RN  Outcome: Progressing  1/3/2024 1130 by Dae Kauffman RN  Outcome: Progressing  1/3/2024 1128 by Dae Kauffman RN  Outcome: Progressing     Problem: Pain - Adult  Goal: Verbalizes/displays adequate comfort level or baseline comfort level  1/3/2024 1356 by Dae Kauffman RN  Outcome: Progressing  1/3/2024 1130 by Dae Kauffman RN  Outcome: Progressing  1/3/2024 1128 by Dae Kauffman RN  Outcome: Progressing     Problem: Safety - Adult  Goal: Free from fall injury  1/3/2024 1356 by Dae Kauffman RN  Outcome: Progressing  1/3/2024 1130 by Dae Kauffman RN  Outcome: Progressing  1/3/2024 1128 by Dae Kauffman RN  Outcome: Progressing     Problem: Discharge Planning  Goal: Discharge to home or other facility with appropriate resources  1/3/2024 1356 by Dae Kauffman RN  Outcome: Progressing  1/3/2024 1130 by Dae Kauffman RN  Outcome: Progressing  1/3/2024 1128 by Dae Kauffman RN  Outcome: Progressing     Problem: Chronic Conditions and Co-morbidities  Goal: Patient's chronic conditions and co-morbidity symptoms are monitored and maintained or improved  1/3/2024 1356 by Dae Kauffman RN  Outcome: Progressing  1/3/2024 1130 by Dae Kauffman RN  Outcome: Progressing  1/3/2024 1128 by Dae Kauffman  RN  Outcome: Progressing     Problem: General Stroke  Goal: Establish a mutual long term goal with patient by discharge  1/3/2024 1356 by Dae Kauffman RN  Outcome: Progressing  1/3/2024 1130 by Dae Kauffman RN  Outcome: Progressing  1/3/2024 1128 by Dae Kauffman RN  Outcome: Progressing  Goal: Demonstrate improvement in neurological exam throughout the shift  1/3/2024 1356 by Dae Kauffman RN  Outcome: Progressing  1/3/2024 1130 by Dae Kauffman RN  Outcome: Progressing  1/3/2024 1128 by Dae Kauffman RN  Outcome: Progressing  Goal: Maintain BP within ordered limits throughout shift  1/3/2024 1356 by Dae Kauffman RN  Outcome: Progressing  1/3/2024 1130 by Dae Kauffman RN  Outcome: Progressing  1/3/2024 1128 by Dae Kauffman RN  Outcome: Progressing  Goal: Participate in treatment (ie., meds, therapy) throughout shift  1/3/2024 1356 by Dae Kauffman RN  Outcome: Progressing  1/3/2024 1130 by Dae Kauffman RN  Outcome: Progressing  1/3/2024 1128 by Dae Kauffman RN  Outcome: Progressing  Goal: No symptoms of aspiration throughout shift  1/3/2024 1356 by Dae Kauffman RN  Outcome: Progressing  1/3/2024 1130 by Dae Kauffman RN  Outcome: Progressing  1/3/2024 1128 by Dae Kauffman RN  Outcome: Progressing  Goal: No symptoms of hemorrhage throughout shift  1/3/2024 1356 by Dae Kauffman RN  Outcome: Progressing  1/3/2024 1130 by Dae Kauffman RN  Outcome: Progressing  1/3/2024 1128 by Dae Kauffman RN  Outcome: Progressing  Goal: Tolerate enteral feeding throughout shift  1/3/2024 1356 by Dae Kauffman RN  Outcome: Progressing  1/3/2024 1130 by Dae Kauffman RN  Outcome: Progressing  1/3/2024 1128 by Dae Kauffman RN  Outcome: Progressing  Goal: Decreased nausea/vomiting throughout shift  1/3/2024 1356 by Dae Kauffman RN  Outcome: Progressing  1/3/2024 1130 by Dae Kauffman RN  Outcome: Progressing  1/3/2024 1128 by Dae Kauffman RN  Outcome: Progressing  Goal: Controlled blood glucose  throughout shift  1/3/2024 1356 by Dae Kauffman RN  Outcome: Progressing  1/3/2024 1130 by Dae Kauffman RN  Outcome: Progressing  1/3/2024 1128 by Dae Kauffman RN  Outcome: Progressing  Goal: Out of bed three times today  1/3/2024 1356 by Dae Kauffman RN  Outcome: Progressing  1/3/2024 1130 by Dae Kauffman RN  Outcome: Progressing  1/3/2024 1128 by Dae Kauffman RN  Outcome: Progressing     Problem: ICU Stroke  Goal: Maintain ICP within ordered limits throughout shift  1/3/2024 1356 by Dae Kauffman RN  Outcome: Progressing  1/3/2024 1130 by Dae Kauffman RN  Outcome: Progressing  1/3/2024 1128 by Dae Kauffman RN  Outcome: Progressing  Goal: Tolerate EVD clamping trial throughout shift  1/3/2024 1356 by Dae Kauffman RN  Outcome: Progressing  1/3/2024 1130 by Dae Kauffman RN  Outcome: Progressing  1/3/2024 1128 by Dae Kauffman RN  Outcome: Progressing  Goal: Tolerate ventilator weaning trial during shift  1/3/2024 1356 by Dae Kauffman RN  Outcome: Progressing  1/3/2024 1130 by Dae Kauffman RN  Outcome: Progressing  1/3/2024 1128 by Dae Kauffman RN  Outcome: Progressing  Goal: Maintain patent airway throughout shift  1/3/2024 1356 by Dae Kauffman RN  Outcome: Progressing  1/3/2024 1130 by Dae Kauffman RN  Outcome: Progressing  1/3/2024 1128 by Dae Kauffman RN  Outcome: Progressing  Goal: Achieve/maintain targeted sodium level throughout shift  1/3/2024 1356 by Dae Kauffman RN  Outcome: Progressing  1/3/2024 1130 by Dae Kauffman RN  Outcome: Progressing  1/3/2024 1128 by Dae Kauffman RN  Outcome: Progressing   The patient's goals for the shift include      The clinical goals for the shift include Patient will remain comfortable throughout the shift.

## 2024-01-03 NOTE — CARE PLAN
The patient's goals for the shift include      The clinical goals for the shift include Patient will remain safe throughout the shift.

## 2024-01-03 NOTE — PROGRESS NOTES
Physical Therapy                 Therapy Communication Note    Patient Name: Zo Nunez  MRN: [id number]  Today's Date: 1/3/2024     Discipline: Physical Therapy    Missed Visit Reason: Missed Visit Reason:  (per RN pt set to DC at 1345)    Missed Time: Attempt    Comment:

## 2024-01-03 NOTE — CARE PLAN
The patient's goals for the shift include      The clinical goals for the shift include Patient will remain safe throughout the shift.    Over the shift, the patient did not make progress toward the following goals. Barriers to progression include stroke symptoms. Recommendations to address these barriers include encouraging verbalization and movement.

## 2024-01-03 NOTE — CARE PLAN
Problem: Fall/Injury  Goal: Not fall by end of shift  1/3/2024 1130 by Dae Kauffman RN  Outcome: Progressing  1/3/2024 1128 by Dae Kauffman RN  Outcome: Progressing  Goal: Be free from injury by end of the shift  1/3/2024 1130 by Dae Kauffman RN  Outcome: Progressing  1/3/2024 1128 by Dae Kauffman RN  Outcome: Progressing  Goal: Verbalize understanding of personal risk factors for fall in the hospital  1/3/2024 1130 by Dae Kauffman RN  Outcome: Progressing  1/3/2024 1128 by Dae Kauffman RN  Outcome: Progressing  Goal: Verbalize understanding of risk factor reduction measures to prevent injury from fall in the home  1/3/2024 1130 by Dae Kauffman RN  Outcome: Progressing  1/3/2024 1128 by Dae Kauffman RN  Outcome: Progressing  Goal: Use assistive devices by end of the shift  1/3/2024 1130 by Dae Kauffman RN  Outcome: Progressing  1/3/2024 1128 by Dae Kauffman RN  Outcome: Progressing  Goal: Pace activities to prevent fatigue by end of the shift  1/3/2024 1130 by Dae Kauffman RN  Outcome: Progressing  1/3/2024 1128 by Dae Kauffman RN  Outcome: Progressing     Problem: Skin  Goal: Decreased wound size/increased tissue granulation at next dressing change  1/3/2024 1130 by Dae Kauffman RN  Outcome: Progressing  1/3/2024 1128 by Dae Kauffman RN  Outcome: Progressing  Goal: Participates in plan/prevention/treatment measures  1/3/2024 1130 by Dae Kauffman RN  Outcome: Progressing  1/3/2024 1128 by Dae Kauffman RN  Outcome: Progressing  Goal: Prevent/manage excess moisture  1/3/2024 1130 by Dae Kauffman RN  Outcome: Progressing  1/3/2024 1128 by Dae Kauffman RN  Outcome: Progressing  Goal: Prevent/minimize sheer/friction injuries  1/3/2024 1130 by Dae Kauffman RN  Outcome: Progressing  1/3/2024 1128 by Dae Kauffman RN  Outcome: Progressing  Goal: Promote/optimize nutrition  1/3/2024 1130 by Dae Kauffman RN  Outcome: Progressing  1/3/2024 1128 by Dae Kauffman RN  Outcome:  Progressing  Goal: Promote skin healing  1/3/2024 1130 by Dae Kauffman RN  Outcome: Progressing  1/3/2024 1128 by Dae Kauffman RN  Outcome: Progressing     Problem: Pain - Adult  Goal: Verbalizes/displays adequate comfort level or baseline comfort level  1/3/2024 1130 by Dae Kauffman RN  Outcome: Progressing  1/3/2024 1128 by Dae Kauffman RN  Outcome: Progressing     Problem: Safety - Adult  Goal: Free from fall injury  1/3/2024 1130 by Dae Kauffman RN  Outcome: Progressing  1/3/2024 1128 by Dae Kauffman RN  Outcome: Progressing     Problem: Discharge Planning  Goal: Discharge to home or other facility with appropriate resources  1/3/2024 1130 by Dae Kauffman RN  Outcome: Progressing  1/3/2024 1128 by Dae Kauffman RN  Outcome: Progressing     Problem: Chronic Conditions and Co-morbidities  Goal: Patient's chronic conditions and co-morbidity symptoms are monitored and maintained or improved  1/3/2024 1130 by Dae Kauffman RN  Outcome: Progressing  1/3/2024 1128 by Dae Kauffman RN  Outcome: Progressing     Problem: General Stroke  Goal: Establish a mutual long term goal with patient by discharge  1/3/2024 1130 by Dae Kauffman RN  Outcome: Progressing  1/3/2024 1128 by Dae Kauffman RN  Outcome: Progressing  Goal: Demonstrate improvement in neurological exam throughout the shift  1/3/2024 1130 by Dae Kauffman RN  Outcome: Progressing  1/3/2024 1128 by Dae Kauffman RN  Outcome: Progressing  Goal: Maintain BP within ordered limits throughout shift  1/3/2024 1130 by Dae Kauffman RN  Outcome: Progressing  1/3/2024 1128 by Dae Kauffman RN  Outcome: Progressing  Goal: Participate in treatment (ie., meds, therapy) throughout shift  1/3/2024 1130 by Dae Kauffman RN  Outcome: Progressing  1/3/2024 1128 by Dae Kauffman RN  Outcome: Progressing  Goal: No symptoms of aspiration throughout shift  1/3/2024 1130 by Dae Kauffman RN  Outcome: Progressing  1/3/2024 1128 by Dae Kauffman RN  Outcome:  Progressing  Goal: No symptoms of hemorrhage throughout shift  1/3/2024 1130 by Dae Kauffman RN  Outcome: Progressing  1/3/2024 1128 by Dae Kauffman RN  Outcome: Progressing  Goal: Tolerate enteral feeding throughout shift  1/3/2024 1130 by Dae Kauffman RN  Outcome: Progressing  1/3/2024 1128 by Dae Kauffman RN  Outcome: Progressing  Goal: Decreased nausea/vomiting throughout shift  1/3/2024 1130 by Dae Kauffman RN  Outcome: Progressing  1/3/2024 1128 by Dae Kauffman RN  Outcome: Progressing  Goal: Controlled blood glucose throughout shift  1/3/2024 1130 by Dae Kauffman RN  Outcome: Progressing  1/3/2024 1128 by Dae Kauffman RN  Outcome: Progressing  Goal: Out of bed three times today  1/3/2024 1130 by Dae Kauffman RN  Outcome: Progressing  1/3/2024 1128 by Dae Kauffman RN  Outcome: Progressing     Problem: ICU Stroke  Goal: Maintain ICP within ordered limits throughout shift  1/3/2024 1130 by Dae Kauffman RN  Outcome: Progressing  1/3/2024 1128 by Dae Kauffman RN  Outcome: Progressing  Goal: Tolerate EVD clamping trial throughout shift  1/3/2024 1130 by Dae Kauffman RN  Outcome: Progressing  1/3/2024 1128 by Dae Kauffman RN  Outcome: Progressing  Goal: Tolerate ventilator weaning trial during shift  1/3/2024 1130 by Dae Kauffman RN  Outcome: Progressing  1/3/2024 1128 by Dae Kauffman RN  Outcome: Progressing  Goal: Maintain patent airway throughout shift  1/3/2024 1130 by Dae Kauffman RN  Outcome: Progressing  1/3/2024 1128 by Dae Kauffman RN  Outcome: Progressing  Goal: Achieve/maintain targeted sodium level throughout shift  1/3/2024 1130 by Dae Kauffman RN  Outcome: Progressing  1/3/2024 1128 by Dae Kauffman RN  Outcome: Progressing   The patient's goals for the shift include      The clinical goals for the shift include Patient will remain comfortable throughout the shift.

## 2024-01-03 NOTE — PROGRESS NOTES
Patient is medically ready for discharge.  Updated [facility] via careport.  Will request transportation be set up by [facility].  Nurse report can be called to [phone] or [phone].  Informed son, Bhavesh about discharge.  Eva Keating RN     Transitional Care Coordinator Note @ 1170  [facility] Vehicle you requested for Zo LEONARDO in unit/room [id number] on 01/03/2024 is scheduled to arrive at 12:30pm EST! [facility] is handling this ride and you can contact them at [phone]   Patient and son informed of discharge time.  Eva Keating RN

## 2024-01-04 ENCOUNTER — LAB REQUISITION (OUTPATIENT)
Dept: LAB | Facility: LAB | Age: 69
End: 2024-01-04
Payer: COMMERCIAL

## 2024-01-04 LAB
ALBUMIN SERPL BCP-MCNC: 3.2 G/DL (ref 3.4–5)
ALP SERPL-CCNC: 95 U/L (ref 33–136)
ALT SERPL W P-5'-P-CCNC: 11 U/L (ref 7–45)
ANION GAP SERPL CALC-SCNC: 15 MMOL/L (ref 10–20)
APPEARANCE UR: ABNORMAL
AST SERPL W P-5'-P-CCNC: 16 U/L (ref 9–39)
BACTERIA #/AREA URNS AUTO: ABNORMAL /HPF
BILIRUB SERPL-MCNC: 0.7 MG/DL (ref 0–1.2)
BILIRUB UR STRIP.AUTO-MCNC: NEGATIVE MG/DL
BUN SERPL-MCNC: 16 MG/DL (ref 6–23)
CALCIUM SERPL-MCNC: 8.5 MG/DL (ref 8.6–10.3)
CHLORIDE SERPL-SCNC: 107 MMOL/L (ref 98–107)
CO2 SERPL-SCNC: 22 MMOL/L (ref 21–32)
COLOR UR: ABNORMAL
CREAT SERPL-MCNC: 0.88 MG/DL (ref 0.5–1.05)
ERYTHROCYTE [DISTWIDTH] IN BLOOD BY AUTOMATED COUNT: 14.9 % (ref 11.5–14.5)
GFR SERPL CREATININE-BSD FRML MDRD: 71 ML/MIN/1.73M*2
GLUCOSE SERPL-MCNC: 94 MG/DL (ref 74–99)
GLUCOSE UR STRIP.AUTO-MCNC: NEGATIVE MG/DL
HCT VFR BLD AUTO: 38.8 % (ref 36–46)
HGB BLD-MCNC: 12.2 G/DL (ref 12–16)
HYALINE CASTS #/AREA URNS AUTO: ABNORMAL /LPF
KETONES UR STRIP.AUTO-MCNC: NEGATIVE MG/DL
LEUKOCYTE ESTERASE UR QL STRIP.AUTO: ABNORMAL
MCH RBC QN AUTO: 28.7 PG (ref 26–34)
MCHC RBC AUTO-ENTMCNC: 31.4 G/DL (ref 32–36)
MCV RBC AUTO: 91 FL (ref 80–100)
MUCOUS THREADS #/AREA URNS AUTO: ABNORMAL /LPF
NITRITE UR QL STRIP.AUTO: NEGATIVE
NRBC BLD-RTO: 0 /100 WBCS (ref 0–0)
PH UR STRIP.AUTO: 5 [PH]
PLATELET # BLD AUTO: 198 X10*3/UL (ref 150–450)
POTASSIUM SERPL-SCNC: 3.5 MMOL/L (ref 3.5–5.3)
PROT SERPL-MCNC: 5.6 G/DL (ref 6.4–8.2)
PROT UR STRIP.AUTO-MCNC: NEGATIVE MG/DL
RBC # BLD AUTO: 4.25 X10*6/UL (ref 4–5.2)
RBC # UR STRIP.AUTO: NEGATIVE /UL
RBC #/AREA URNS AUTO: ABNORMAL /HPF
SODIUM SERPL-SCNC: 140 MMOL/L (ref 136–145)
SP GR UR STRIP.AUTO: 1.02
SQUAMOUS #/AREA URNS AUTO: ABNORMAL /HPF
UROBILINOGEN UR STRIP.AUTO-MCNC: <2 MG/DL
WBC # BLD AUTO: 5.8 X10*3/UL (ref 4.4–11.3)
WBC #/AREA URNS AUTO: ABNORMAL /HPF

## 2024-01-04 PROCEDURE — 87086 URINE CULTURE/COLONY COUNT: CPT

## 2024-01-04 PROCEDURE — 80053 COMPREHEN METABOLIC PANEL: CPT

## 2024-01-04 PROCEDURE — 81001 URINALYSIS AUTO W/SCOPE: CPT

## 2024-01-04 PROCEDURE — 85027 COMPLETE CBC AUTOMATED: CPT

## 2024-01-05 ENCOUNTER — APPOINTMENT (OUTPATIENT)
Dept: RADIOLOGY | Facility: HOSPITAL | Age: 69
End: 2024-01-05
Payer: COMMERCIAL

## 2024-01-05 ENCOUNTER — APPOINTMENT (OUTPATIENT)
Dept: RADIOLOGY | Facility: HOSPITAL | Age: 69
End: 2024-01-05
Payer: MEDICARE

## 2024-01-05 ENCOUNTER — HOSPITAL ENCOUNTER (EMERGENCY)
Facility: HOSPITAL | Age: 69
Discharge: HOME | End: 2024-01-06
Attending: EMERGENCY MEDICINE
Payer: MEDICARE

## 2024-01-05 ENCOUNTER — HOSPITAL ENCOUNTER (EMERGENCY)
Facility: HOSPITAL | Age: 69
Discharge: NURSING, REHAB OR LONG-TERM CARE | End: 2024-01-05
Attending: EMERGENCY MEDICINE
Payer: COMMERCIAL

## 2024-01-05 VITALS
SYSTOLIC BLOOD PRESSURE: 197 MMHG | OXYGEN SATURATION: 100 % | DIASTOLIC BLOOD PRESSURE: 96 MMHG | TEMPERATURE: 98.5 F | HEART RATE: 87 BPM | RESPIRATION RATE: 16 BRPM

## 2024-01-05 VITALS
OXYGEN SATURATION: 100 % | SYSTOLIC BLOOD PRESSURE: 131 MMHG | TEMPERATURE: 98.6 F | HEART RATE: 99 BPM | RESPIRATION RATE: 19 BRPM | DIASTOLIC BLOOD PRESSURE: 84 MMHG

## 2024-01-05 DIAGNOSIS — Z46.59 ENCOUNTER FOR FEEDING TUBE PLACEMENT: Primary | ICD-10-CM

## 2024-01-05 DIAGNOSIS — Z46.59 ENCOUNTER FOR NASOGASTRIC (NG) TUBE PLACEMENT: Primary | ICD-10-CM

## 2024-01-05 PROBLEM — Z74.09 IMPAIRED MOBILITY AND ACTIVITIES OF DAILY LIVING: Status: ACTIVE | Noted: 2024-01-05

## 2024-01-05 PROBLEM — I69.891 DYSPHAGIA AS LATE EFFECT OF CEREBRAL ANEURYSM: Status: ACTIVE | Noted: 2024-01-05

## 2024-01-05 PROBLEM — Z78.9 IMPAIRED MOBILITY AND ACTIVITIES OF DAILY LIVING: Status: ACTIVE | Noted: 2024-01-05

## 2024-01-05 PROBLEM — R47.01 EXPRESSIVE APHASIA: Status: ACTIVE | Noted: 2024-01-05

## 2024-01-05 LAB — BACTERIA UR CULT: ABNORMAL

## 2024-01-05 PROCEDURE — 2500000001 HC RX 250 WO HCPCS SELF ADMINISTERED DRUGS (ALT 637 FOR MEDICARE OP)

## 2024-01-05 PROCEDURE — 99283 EMERGENCY DEPT VISIT LOW MDM: CPT | Mod: 25

## 2024-01-05 PROCEDURE — 43753 TX GASTRO INTUB W/ASP: CPT

## 2024-01-05 PROCEDURE — 74018 RADEX ABDOMEN 1 VIEW: CPT

## 2024-01-05 PROCEDURE — 99284 EMERGENCY DEPT VISIT MOD MDM: CPT | Performed by: EMERGENCY MEDICINE

## 2024-01-05 PROCEDURE — 99232 SBSQ HOSP IP/OBS MODERATE 35: CPT | Performed by: PHYSICAL MEDICINE & REHABILITATION

## 2024-01-05 PROCEDURE — 71045 X-RAY EXAM CHEST 1 VIEW: CPT | Performed by: STUDENT IN AN ORGANIZED HEALTH CARE EDUCATION/TRAINING PROGRAM

## 2024-01-05 PROCEDURE — 74018 RADEX ABDOMEN 1 VIEW: CPT | Mod: FOREIGN READ | Performed by: RADIOLOGY

## 2024-01-05 PROCEDURE — 74018 RADEX ABDOMEN 1 VIEW: CPT | Performed by: STUDENT IN AN ORGANIZED HEALTH CARE EDUCATION/TRAINING PROGRAM

## 2024-01-05 RX ORDER — OXYMETAZOLINE HCL 0.05 %
SPRAY, NON-AEROSOL (ML) NASAL
Status: DISCONTINUED
Start: 2024-01-05 | End: 2024-01-05 | Stop reason: WASHOUT

## 2024-01-05 RX ADMIN — BENZOCAINE, BUTAMBEN, AND TETRACAINE HYDROCHLORIDE 1 SPRAY: .028; .004; .004 AEROSOL, SPRAY TOPICAL at 18:35

## 2024-01-05 ASSESSMENT — PAIN SCALES - GENERAL
PAINLEVEL_OUTOF10: 0 - NO PAIN

## 2024-01-05 ASSESSMENT — COLUMBIA-SUICIDE SEVERITY RATING SCALE - C-SSRS
1. IN THE PAST MONTH, HAVE YOU WISHED YOU WERE DEAD OR WISHED YOU COULD GO TO SLEEP AND NOT WAKE UP?: NO
1. IN THE PAST MONTH, HAVE YOU WISHED YOU WERE DEAD OR WISHED YOU COULD GO TO SLEEP AND NOT WAKE UP?: NO
6. HAVE YOU EVER DONE ANYTHING, STARTED TO DO ANYTHING, OR PREPARED TO DO ANYTHING TO END YOUR LIFE?: NO
2. HAVE YOU ACTUALLY HAD ANY THOUGHTS OF KILLING YOURSELF?: NO
2. HAVE YOU ACTUALLY HAD ANY THOUGHTS OF KILLING YOURSELF?: NO
6. HAVE YOU EVER DONE ANYTHING, STARTED TO DO ANYTHING, OR PREPARED TO DO ANYTHING TO END YOUR LIFE?: NO

## 2024-01-05 ASSESSMENT — PAIN - FUNCTIONAL ASSESSMENT
PAIN_FUNCTIONAL_ASSESSMENT: 0-10
PAIN_FUNCTIONAL_ASSESSMENT: 0-10

## 2024-01-05 NOTE — PROGRESS NOTES
acute rehab     01/05/24 1157   Current Planned Discharge Disposition   Current Planned Discharge Disposition Rehab

## 2024-01-05 NOTE — ED PROVIDER NOTES
HPI   Chief Complaint   Patient presents with    Feeding Tube       HPI: 68-year-old female I had seen earlier today and placed a Dobbhoff and patient was returned to the rehab center they apparently were placing pill fragments and they may have gotten the tube obstructed.  We tried to flush the tube and we are unsuccessful.  Because this is her only option for medication I chose to place an NG #12 that they can crush up the pills and at least utilize.  I remove the Dobbhoff interestingly the Dobbhoff was functioning well so maybe it got kinked in the stomach.  At any rate it did have to be removed and it was removed without difficulty.  My procedure note is as follows with the patient understanding the need for this NG to I placed a #12 after spraying with Cetacaine spray to 65 cm with positive borborygmi a chest x-ray was ordered to confirm placement and the patient will be returned at this time her blood pressure is 131/84, temp 37 heart rate 69 respirate is 19 pulse ox 100% she is resting comfortably she is upset and is making tears which at least confirms that she is not dehydrated.      PMH: Recovery from alcohol dependence, constipation hypertension recent stroke    SH negative tobacco recovery from alcohol  FH positive for both heart disease Diabetes  ROS  General Appears in distress  HEENT: No sore throat, No Visual Loss, No Headache, No Ear Pain  Neck: Denies neck pain  Chest: No chest pain, no pleuritic pain, no chest wall injury  Pulmonary: No SOB, No Cough, No Sputum production, No Wheezing  GI: No abdominal pain, no nausea or vomiting, no diarrhea.  : No dysuria, no frequency, no hematuria.  Extremities: No musculoskeletal pain, normal ambulation, no paresthesia.  Psych: Normal interaction, no anxiety, no depression, no suicidal ideation  Skin: No rashes    ROS is otherwise negative    PE: General: Appears in distress        HEENT: Throat is moist without exudate, midline uvula dentate intact, Tms  clear with normal anatomy.        Neck: Supple non tender        Chest CTA, good AE, no wheezing, rales, or rhonci        CVA: RRR S1S2 no S3S4 or murmur        ABD: W/S/NT no HSM, no pulsatile masses, good bowel sounds        Extremities: Excellent distal pulses, brisk capillary refill. Full ROM        Psych: Normal interactions with no signs of depression  or suicidal ideation.        Neuro: Alert and oriented, moves all and feels all.    MDM:68-year-old female I had seen earlier today and placed a Dobbhoff and patient was returned to the rehab center they apparently were placing pill fragments and they may have gotten the tube obstructed.  We tried to flush the tube and we are unsuccessful.  Because this is her only option for medication I chose to place an NG #12 that they can crush up the pills and at least utilize.  I remove the Dobbhoff interestingly the Dobbhoff was functioning well so maybe it got kinked in the stomach.  At any rate it did have to be removed and it was removed without difficulty.  My procedure note is as follows with the patient understanding the need for this NG to I placed a #12 after spraying with Cetacaine spray to 65 cm with positive borborygmi a chest x-ray was ordered to confirm placement and the patient will be returned at this time her blood pressure is 131/84, temp 37 heart rate 69 respirate is 19 pulse ox 100% she is resting comfortably she is upset and is making tears which at least confirms that she is not dehydrated    I repositioned the NG tube after seeing the report from radiology reporting cramping of the NG tube I removed it approximately 8 cm reimaging now shows a straightened tube in the gastric fundal area this should be very good for functioning NG tube medications and nutrition.    Repositioning has confirmed proper placement by radiology.                            Lisa Coma Scale Score: 15                  Patient History   Past Medical History:   Diagnosis Date     Acute upper respiratory infection, unspecified     Acute upper respiratory infection    Alcohol dependence, in remission (CMS/[facility]) 12/26/2019    History of alcoholism    Alcohol dependence, in remission (CMS/[facility]) 12/26/2019    History of alcohol dependence    Constipation, unspecified 12/26/2019    Constipation in female    Essential (primary) hypertension 12/26/2019    HTN (hypertension), benign    Other malaise 12/26/2019    Physical deconditioning    Personal history of other diseases of the circulatory system 12/26/2019    History of atrial fibrillation    Personal history of other diseases of the musculoskeletal system and connective tissue 12/26/2019    History of rhabdomyolysis    Personal history of other specified conditions 12/26/2019    History of weakness    Pressure ulcer of sacral region, stage 2 (CMS/[facility]) 12/26/2019    Pressure injury of sacral region, stage 2    Trichomonal vulvovaginitis     Trichomonal vulvovaginitis     Past Surgical History:   Procedure Laterality Date    OTHER SURGICAL HISTORY  12/26/2019    Decubitus ulcer debridement     No family history on file.  Social History     Tobacco Use    Smoking status: Never    Smokeless tobacco: Never   Substance Use Topics    Alcohol use: Defer    Drug use: Defer       Physical Exam   ED Triage Vitals [01/05/24 1735]   Temp Heart Rate Resp BP   37 °C (98.6 °F) 99 19 131/84      SpO2 Temp src Heart Rate Source Patient Position   100 % -- -- --      BP Location FiO2 (%)     -- --       Physical Exam    ED Course & MDM   Diagnoses as of 01/05/24 1907   Encounter for nasogastric (NG) tube placement       Medical Decision Making      Procedure  Procedures     Yusuf Berger MD  01/05/24 1821       Yusuf Berger MD  01/05/24 1907       Yusuf Berger MD  01/05/24 1923

## 2024-01-05 NOTE — ED PROVIDER NOTES
HPI   Chief Complaint   Patient presents with   • Feeding Tube       HPI: 68-year-old female sent in for feeding tube placement difficulty having this placed at the nursing center.  Patient has signs of dehydration they placed a PICC line and have been hydrating her.  But they have sent her in specifically for placement patient is agreeable for placement.  My procedure note is contained within this paragraph.  With patient's consent we placed a Dobbhoff NG tube without difficulty and an x-ray confirmed at 65 cm we were within the gastric area.  However it seems proximal also I advanced to 70 cm and then a bridle magnetic holding device was placed without complication patient will be returned to the nursing center without complication feeding tube placed.      PMH: History of hypertension history of atrial fibrillation alcohol dependence in remission stroke    SH never used tobacco positive abuse alcohol in remission  FH positive heart disease Diabetes  ROS  General Appears in distress  HEENT: No sore throat, No Visual Loss, No Headache, No Ear Pain  Neck: Denies neck pain  Chest: No chest pain, no pleuritic pain, no chest wall injury  Pulmonary: No SOB, No Cough, No Sputum production, No Wheezing  GI: No abdominal pain, no nausea or vomiting, no diarrhea.  : No dysuria, no frequency, no hematuria.  Extremities: No musculoskeletal pain, normal ambulation, no paresthesia.  Psych: Normal interaction, no anxiety, no depression, no suicidal ideation  Skin: No rashes    ROS is otherwise negative    PE: General: Appears in distress        HEENT: Throat is moist without exudate, midline uvula dentate intact, Tms clear with normal anatomy.        Neck: Supple non tender        Chest CTA, good AE, no wheezing, rales, or rhonci        CVA: RRR S1S2 no S3S4 or murmur        ABD: W/S/NT no HSM, no pulsatile masses, good bowel sounds        Extremities: Excellent distal pulses, brisk capillary refill. Full ROM        Psych:  Normal interactions with no signs of depression  or suicidal ideation.        Neuro: Alert and oriented, moves all and feels all.    MDM: Dobbhoff feeding tube placed without complication nasal bridle completed x-ray confirming placement patient returned to nursing center.        History of alcohol abuse history of rhabdomyolysis never smoked                    Lisa Coma Scale Score: 15                  Patient History   Past Medical History:   Diagnosis Date   • Acute upper respiratory infection, unspecified     Acute upper respiratory infection   • Alcohol dependence, in remission (CMS/[facility]) 12/26/2019    History of alcoholism   • Alcohol dependence, in remission (CMS/[facility]) 12/26/2019    History of alcohol dependence   • Constipation, unspecified 12/26/2019    Constipation in female   • Essential (primary) hypertension 12/26/2019    HTN (hypertension), benign   • Other malaise 12/26/2019    Physical deconditioning   • Personal history of other diseases of the circulatory system 12/26/2019    History of atrial fibrillation   • Personal history of other diseases of the musculoskeletal system and connective tissue 12/26/2019    History of rhabdomyolysis   • Personal history of other specified conditions 12/26/2019    History of weakness   • Pressure ulcer of sacral region, stage 2 (CMS/[facility]) 12/26/2019    Pressure injury of sacral region, stage 2   • Trichomonal vulvovaginitis     Trichomonal vulvovaginitis     Past Surgical History:   Procedure Laterality Date   • OTHER SURGICAL HISTORY  12/26/2019    Decubitus ulcer debridement     No family history on file.  Social History     Tobacco Use   • Smoking status: Never   • Smokeless tobacco: Never   Substance Use Topics   • Alcohol use: Defer   • Drug use: Defer       Physical Exam   ED Triage Vitals [01/05/24 1123]   Temp Heart Rate Resp BP   36.9 °C (98.5 °F) (!) 113 16 162/89      SpO2 Temp Source Heart Rate Source Patient Position   100 % Oral -- --      BP  Location FiO2 (%)     -- --       Physical Exam    ED Course & MDM   Diagnoses as of 01/05/24 1146   Encounter for feeding tube placement       Medical Decision Making      Procedure  Procedures     Yusuf Berger MD  01/05/24 1149

## 2024-01-06 ENCOUNTER — HOSPITAL ENCOUNTER (INPATIENT)
Facility: HOSPITAL | Age: 69
LOS: 3 days | Discharge: HOME | DRG: 057 | End: 2024-01-10
Attending: EMERGENCY MEDICINE | Admitting: INTERNAL MEDICINE
Payer: MEDICARE

## 2024-01-06 DIAGNOSIS — I10 ESSENTIAL HYPERTENSION: ICD-10-CM

## 2024-01-06 DIAGNOSIS — R13.12 OROPHARYNGEAL DYSPHAGIA: ICD-10-CM

## 2024-01-06 DIAGNOSIS — R47.01 EXPRESSIVE APHASIA: ICD-10-CM

## 2024-01-06 DIAGNOSIS — I63.9 CEREBROVASCULAR ACCIDENT (CVA), UNSPECIFIED MECHANISM (MULTI): ICD-10-CM

## 2024-01-06 DIAGNOSIS — R13.10 DYSPHAGIA: Primary | ICD-10-CM

## 2024-01-06 DIAGNOSIS — I48.11 LONGSTANDING PERSISTENT ATRIAL FIBRILLATION (MULTI): ICD-10-CM

## 2024-01-06 LAB
ALBUMIN SERPL BCP-MCNC: 3.6 G/DL (ref 3.4–5)
ALP SERPL-CCNC: 97 U/L (ref 33–136)
ALT SERPL W P-5'-P-CCNC: 11 U/L (ref 7–45)
ANION GAP SERPL CALC-SCNC: 17 MMOL/L (ref 10–20)
AST SERPL W P-5'-P-CCNC: 18 U/L (ref 9–39)
BASOPHILS # BLD AUTO: 0.05 X10*3/UL (ref 0–0.1)
BASOPHILS NFR BLD AUTO: 0.5 %
BILIRUB DIRECT SERPL-MCNC: 0.1 MG/DL (ref 0–0.3)
BILIRUB SERPL-MCNC: 0.5 MG/DL (ref 0–1.2)
BUN SERPL-MCNC: 16 MG/DL (ref 6–23)
CALCIUM SERPL-MCNC: 8.9 MG/DL (ref 8.6–10.3)
CHLORIDE SERPL-SCNC: 107 MMOL/L (ref 98–107)
CO2 SERPL-SCNC: 19 MMOL/L (ref 21–32)
CREAT SERPL-MCNC: 0.84 MG/DL (ref 0.5–1.05)
EOSINOPHIL # BLD AUTO: 0.1 X10*3/UL (ref 0–0.7)
EOSINOPHIL NFR BLD AUTO: 1 %
ERYTHROCYTE [DISTWIDTH] IN BLOOD BY AUTOMATED COUNT: 14.2 % (ref 11.5–14.5)
GFR SERPL CREATININE-BSD FRML MDRD: 75 ML/MIN/1.73M*2
GLUCOSE SERPL-MCNC: 85 MG/DL (ref 74–99)
HCT VFR BLD AUTO: 41.2 % (ref 36–46)
HGB BLD-MCNC: 13.2 G/DL (ref 12–16)
IMM GRANULOCYTES # BLD AUTO: 0.02 X10*3/UL (ref 0–0.7)
IMM GRANULOCYTES NFR BLD AUTO: 0.2 % (ref 0–0.9)
INR PPP: 1.1 (ref 0.9–1.1)
LYMPHOCYTES # BLD AUTO: 1.42 X10*3/UL (ref 1.2–4.8)
LYMPHOCYTES NFR BLD AUTO: 14.7 %
MCH RBC QN AUTO: 29 PG (ref 26–34)
MCHC RBC AUTO-ENTMCNC: 32 G/DL (ref 32–36)
MCV RBC AUTO: 91 FL (ref 80–100)
MONOCYTES # BLD AUTO: 0.9 X10*3/UL (ref 0.1–1)
MONOCYTES NFR BLD AUTO: 9.3 %
NEUTROPHILS # BLD AUTO: 7.2 X10*3/UL (ref 1.2–7.7)
NEUTROPHILS NFR BLD AUTO: 74.3 %
NRBC BLD-RTO: 0 /100 WBCS (ref 0–0)
PLATELET # BLD AUTO: 229 X10*3/UL (ref 150–450)
POTASSIUM SERPL-SCNC: 3.3 MMOL/L (ref 3.5–5.3)
PROT SERPL-MCNC: 6.7 G/DL (ref 6.4–8.2)
PROTHROMBIN TIME: 12.8 SECONDS (ref 9.8–12.8)
RBC # BLD AUTO: 4.55 X10*6/UL (ref 4–5.2)
SODIUM SERPL-SCNC: 140 MMOL/L (ref 136–145)
WBC # BLD AUTO: 9.7 X10*3/UL (ref 4.4–11.3)

## 2024-01-06 PROCEDURE — 96360 HYDRATION IV INFUSION INIT: CPT

## 2024-01-06 PROCEDURE — 36415 COLL VENOUS BLD VENIPUNCTURE: CPT | Performed by: EMERGENCY MEDICINE

## 2024-01-06 PROCEDURE — G0378 HOSPITAL OBSERVATION PER HR: HCPCS

## 2024-01-06 PROCEDURE — 2500000001 HC RX 250 WO HCPCS SELF ADMINISTERED DRUGS (ALT 637 FOR MEDICARE OP): Performed by: PHARMACIST

## 2024-01-06 PROCEDURE — 2500000004 HC RX 250 GENERAL PHARMACY W/ HCPCS (ALT 636 FOR OP/ED): Performed by: PHARMACIST

## 2024-01-06 PROCEDURE — 96361 HYDRATE IV INFUSION ADD-ON: CPT

## 2024-01-06 PROCEDURE — 80048 BASIC METABOLIC PNL TOTAL CA: CPT | Performed by: EMERGENCY MEDICINE

## 2024-01-06 PROCEDURE — 2500000004 HC RX 250 GENERAL PHARMACY W/ HCPCS (ALT 636 FOR OP/ED): Performed by: NURSE PRACTITIONER

## 2024-01-06 PROCEDURE — 82248 BILIRUBIN DIRECT: CPT | Performed by: EMERGENCY MEDICINE

## 2024-01-06 PROCEDURE — 2500000001 HC RX 250 WO HCPCS SELF ADMINISTERED DRUGS (ALT 637 FOR MEDICARE OP): Performed by: NURSE PRACTITIONER

## 2024-01-06 PROCEDURE — 99222 1ST HOSP IP/OBS MODERATE 55: CPT | Performed by: NURSE PRACTITIONER

## 2024-01-06 PROCEDURE — 99285 EMERGENCY DEPT VISIT HI MDM: CPT | Performed by: EMERGENCY MEDICINE

## 2024-01-06 PROCEDURE — 2500000004 HC RX 250 GENERAL PHARMACY W/ HCPCS (ALT 636 FOR OP/ED): Performed by: EMERGENCY MEDICINE

## 2024-01-06 PROCEDURE — 85025 COMPLETE CBC W/AUTO DIFF WBC: CPT | Performed by: EMERGENCY MEDICINE

## 2024-01-06 PROCEDURE — 85610 PROTHROMBIN TIME: CPT | Performed by: EMERGENCY MEDICINE

## 2024-01-06 RX ORDER — METOPROLOL SUCCINATE 50 MG/1
50 TABLET, EXTENDED RELEASE ORAL DAILY
Status: DISCONTINUED | OUTPATIENT
Start: 2024-01-06 | End: 2024-01-10 | Stop reason: HOSPADM

## 2024-01-06 RX ORDER — ALBUTEROL SULFATE 0.83 MG/ML
2.5 SOLUTION RESPIRATORY (INHALATION) EVERY 6 HOURS PRN
Status: DISCONTINUED | OUTPATIENT
Start: 2024-01-06 | End: 2024-01-10 | Stop reason: HOSPADM

## 2024-01-06 RX ORDER — HEPARIN SODIUM 5000 [USP'U]/ML
5000 INJECTION, SOLUTION INTRAVENOUS; SUBCUTANEOUS EVERY 8 HOURS
Status: DISCONTINUED | OUTPATIENT
Start: 2024-01-06 | End: 2024-01-10 | Stop reason: HOSPADM

## 2024-01-06 RX ORDER — LISINOPRIL 20 MG/1
40 TABLET ORAL DAILY
Status: DISCONTINUED | OUTPATIENT
Start: 2024-01-06 | End: 2024-01-10 | Stop reason: HOSPADM

## 2024-01-06 RX ORDER — NIFEDIPINE 30 MG/1
30 TABLET, FILM COATED, EXTENDED RELEASE ORAL DAILY
Status: DISCONTINUED | OUTPATIENT
Start: 2024-01-06 | End: 2024-01-10 | Stop reason: HOSPADM

## 2024-01-06 RX ORDER — CYANOCOBALAMIN 1000 UG/ML
1000 INJECTION, SOLUTION INTRAMUSCULAR; SUBCUTANEOUS
Status: DISCONTINUED | OUTPATIENT
Start: 2024-01-07 | End: 2024-01-10 | Stop reason: HOSPADM

## 2024-01-06 RX ORDER — MONTELUKAST SODIUM 10 MG/1
10 TABLET ORAL NIGHTLY
Status: DISCONTINUED | OUTPATIENT
Start: 2024-01-06 | End: 2024-01-10 | Stop reason: HOSPADM

## 2024-01-06 RX ORDER — LEVOTHYROXINE SODIUM 25 UG/1
25 TABLET ORAL DAILY
Status: DISCONTINUED | OUTPATIENT
Start: 2024-01-06 | End: 2024-01-10 | Stop reason: HOSPADM

## 2024-01-06 RX ORDER — ONDANSETRON HYDROCHLORIDE 2 MG/ML
4 INJECTION, SOLUTION INTRAVENOUS EVERY 8 HOURS PRN
Status: DISCONTINUED | OUTPATIENT
Start: 2024-01-06 | End: 2024-01-10 | Stop reason: HOSPADM

## 2024-01-06 RX ORDER — SODIUM CHLORIDE, SODIUM LACTATE, POTASSIUM CHLORIDE, CALCIUM CHLORIDE 600; 310; 30; 20 MG/100ML; MG/100ML; MG/100ML; MG/100ML
75 INJECTION, SOLUTION INTRAVENOUS CONTINUOUS
Status: DISCONTINUED | OUTPATIENT
Start: 2024-01-06 | End: 2024-01-10 | Stop reason: HOSPADM

## 2024-01-06 RX ORDER — ALBUTEROL SULFATE 90 UG/1
2 AEROSOL, METERED RESPIRATORY (INHALATION) EVERY 6 HOURS PRN
Status: DISCONTINUED | OUTPATIENT
Start: 2024-01-06 | End: 2024-01-06

## 2024-01-06 RX ORDER — SPIRONOLACTONE AND HYDROCHLOROTHIAZIDE 25; 25 MG/1; MG/1
25 TABLET ORAL DAILY
Status: DISCONTINUED | OUTPATIENT
Start: 2024-01-06 | End: 2024-01-06

## 2024-01-06 RX ORDER — HYDROCHLOROTHIAZIDE 25 MG/1
25 TABLET ORAL DAILY
Status: DISCONTINUED | OUTPATIENT
Start: 2024-01-06 | End: 2024-01-10 | Stop reason: HOSPADM

## 2024-01-06 RX ORDER — ATORVASTATIN CALCIUM 80 MG/1
80 TABLET, FILM COATED ORAL NIGHTLY
Status: DISCONTINUED | OUTPATIENT
Start: 2024-01-07 | End: 2024-01-10 | Stop reason: HOSPADM

## 2024-01-06 RX ORDER — POLYETHYLENE GLYCOL 3350 17 G/17G
17 POWDER, FOR SOLUTION ORAL DAILY
Status: DISCONTINUED | OUTPATIENT
Start: 2024-01-06 | End: 2024-01-10 | Stop reason: HOSPADM

## 2024-01-06 RX ORDER — FLUTICASONE PROPIONATE 50 MCG
1 SPRAY, SUSPENSION (ML) NASAL 2 TIMES DAILY PRN
Status: DISCONTINUED | OUTPATIENT
Start: 2024-01-06 | End: 2024-01-10 | Stop reason: HOSPADM

## 2024-01-06 RX ORDER — ACETAMINOPHEN 650 MG/1
650 SUPPOSITORY RECTAL EVERY 4 HOURS PRN
Status: DISCONTINUED | OUTPATIENT
Start: 2024-01-06 | End: 2024-01-10 | Stop reason: HOSPADM

## 2024-01-06 RX ORDER — ENOXAPARIN SODIUM 100 MG/ML
40 INJECTION SUBCUTANEOUS EVERY 24 HOURS
Status: DISCONTINUED | OUTPATIENT
Start: 2024-01-06 | End: 2024-01-06

## 2024-01-06 RX ORDER — ACETAMINOPHEN 325 MG/1
650 TABLET ORAL EVERY 4 HOURS PRN
Status: DISCONTINUED | OUTPATIENT
Start: 2024-01-06 | End: 2024-01-10 | Stop reason: HOSPADM

## 2024-01-06 RX ORDER — ACETAMINOPHEN 160 MG/5ML
650 SOLUTION ORAL EVERY 4 HOURS PRN
Status: DISCONTINUED | OUTPATIENT
Start: 2024-01-06 | End: 2024-01-10 | Stop reason: HOSPADM

## 2024-01-06 RX ORDER — PANTOPRAZOLE SODIUM 40 MG/1
40 TABLET, DELAYED RELEASE ORAL
Status: DISCONTINUED | OUTPATIENT
Start: 2024-01-07 | End: 2024-01-10 | Stop reason: HOSPADM

## 2024-01-06 RX ORDER — SPIRONOLACTONE 25 MG/1
25 TABLET ORAL DAILY
Status: DISCONTINUED | OUTPATIENT
Start: 2024-01-06 | End: 2024-01-10 | Stop reason: HOSPADM

## 2024-01-06 RX ORDER — ONDANSETRON 4 MG/1
4 TABLET, ORALLY DISINTEGRATING ORAL EVERY 8 HOURS PRN
Status: DISCONTINUED | OUTPATIENT
Start: 2024-01-06 | End: 2024-01-10 | Stop reason: HOSPADM

## 2024-01-06 RX ORDER — AMLODIPINE BESYLATE 10 MG/1
10 TABLET ORAL DAILY
Status: DISCONTINUED | OUTPATIENT
Start: 2024-01-06 | End: 2024-01-10 | Stop reason: HOSPADM

## 2024-01-06 RX ORDER — PANTOPRAZOLE SODIUM 40 MG/10ML
40 INJECTION, POWDER, LYOPHILIZED, FOR SOLUTION INTRAVENOUS
Status: DISCONTINUED | OUTPATIENT
Start: 2024-01-07 | End: 2024-01-10 | Stop reason: HOSPADM

## 2024-01-06 RX ORDER — NAPROXEN SODIUM 220 MG/1
81 TABLET, FILM COATED ORAL DAILY
Status: DISCONTINUED | OUTPATIENT
Start: 2024-01-06 | End: 2024-01-10 | Stop reason: HOSPADM

## 2024-01-06 RX ADMIN — SODIUM CHLORIDE 1000 ML: 9 INJECTION, SOLUTION INTRAVENOUS at 14:25

## 2024-01-06 RX ADMIN — METOPROLOL SUCCINATE 50 MG: 50 TABLET, EXTENDED RELEASE ORAL at 20:43

## 2024-01-06 RX ADMIN — AMLODIPINE BESYLATE 10 MG: 10 TABLET ORAL at 20:43

## 2024-01-06 RX ADMIN — MONTELUKAST 10 MG: 10 TABLET, FILM COATED ORAL at 20:43

## 2024-01-06 RX ADMIN — HEPARIN SODIUM 5000 UNITS: 5000 INJECTION INTRAVENOUS; SUBCUTANEOUS at 20:44

## 2024-01-06 RX ADMIN — SODIUM CHLORIDE, POTASSIUM CHLORIDE, SODIUM LACTATE AND CALCIUM CHLORIDE 75 ML/HR: 600; 310; 30; 20 INJECTION, SOLUTION INTRAVENOUS at 20:44

## 2024-01-06 RX ADMIN — SPIRONOLACTONE 25 MG: 25 TABLET ORAL at 20:43

## 2024-01-06 RX ADMIN — LISINOPRIL 40 MG: 20 TABLET ORAL at 20:43

## 2024-01-06 RX ADMIN — HYDROCHLOROTHIAZIDE 25 MG: 25 TABLET ORAL at 20:43

## 2024-01-06 RX ADMIN — ASPIRIN 81 MG CHEWABLE TABLET 81 MG: 81 TABLET CHEWABLE at 20:43

## 2024-01-06 SDOH — SOCIAL STABILITY: SOCIAL INSECURITY: ABUSE: ADULT

## 2024-01-06 SDOH — SOCIAL STABILITY: SOCIAL INSECURITY: HAVE YOU HAD THOUGHTS OF HARMING ANYONE ELSE?: NO

## 2024-01-06 SDOH — SOCIAL STABILITY: SOCIAL INSECURITY: HAS ANYONE EVER THREATENED TO HURT YOUR FAMILY OR YOUR PETS?: NO

## 2024-01-06 SDOH — SOCIAL STABILITY: SOCIAL INSECURITY: ARE YOU OR HAVE YOU BEEN THREATENED OR ABUSED PHYSICALLY, EMOTIONALLY, OR SEXUALLY BY ANYONE?: NO

## 2024-01-06 SDOH — SOCIAL STABILITY: SOCIAL INSECURITY: ARE THERE ANY APPARENT SIGNS OF INJURIES/BEHAVIORS THAT COULD BE RELATED TO ABUSE/NEGLECT?: NO

## 2024-01-06 SDOH — SOCIAL STABILITY: SOCIAL INSECURITY: DO YOU FEEL ANYONE HAS EXPLOITED OR TAKEN ADVANTAGE OF YOU FINANCIALLY OR OF YOUR PERSONAL PROPERTY?: NO

## 2024-01-06 SDOH — SOCIAL STABILITY: SOCIAL INSECURITY: DO YOU FEEL UNSAFE GOING BACK TO THE PLACE WHERE YOU ARE LIVING?: NO

## 2024-01-06 SDOH — SOCIAL STABILITY: SOCIAL INSECURITY: WERE YOU ABLE TO COMPLETE ALL THE BEHAVIORAL HEALTH SCREENINGS?: YES

## 2024-01-06 SDOH — SOCIAL STABILITY: SOCIAL INSECURITY: DOES ANYONE TRY TO KEEP YOU FROM HAVING/CONTACTING OTHER FRIENDS OR DOING THINGS OUTSIDE YOUR HOME?: NO

## 2024-01-06 ASSESSMENT — ENCOUNTER SYMPTOMS
COUGH: 0
CHOKING: 0
APPETITE CHANGE: 0
SHORTNESS OF BREATH: 0
ABDOMINAL PAIN: 0

## 2024-01-06 ASSESSMENT — COGNITIVE AND FUNCTIONAL STATUS - GENERAL
STANDING UP FROM CHAIR USING ARMS: A LITTLE
WALKING IN HOSPITAL ROOM: A LITTLE
EATING MEALS: A LITTLE
HELP NEEDED FOR BATHING: A LITTLE
DRESSING REGULAR LOWER BODY CLOTHING: A LITTLE
MOVING FROM LYING ON BACK TO SITTING ON SIDE OF FLAT BED WITH BEDRAILS: A LITTLE
PERSONAL GROOMING: A LITTLE
TURNING FROM BACK TO SIDE WHILE IN FLAT BAD: A LITTLE
EATING MEALS: A LITTLE
DRESSING REGULAR UPPER BODY CLOTHING: A LITTLE
MOBILITY SCORE: 17
DAILY ACTIVITIY SCORE: 18
TOILETING: A LITTLE
DRESSING REGULAR UPPER BODY CLOTHING: A LITTLE
CLIMB 3 TO 5 STEPS WITH RAILING: A LOT
MOVING TO AND FROM BED TO CHAIR: A LITTLE
DAILY ACTIVITIY SCORE: 18
DRESSING REGULAR LOWER BODY CLOTHING: A LITTLE
PERSONAL GROOMING: A LITTLE
PATIENT BASELINE BEDBOUND: NO
HELP NEEDED FOR BATHING: A LITTLE
TOILETING: A LITTLE

## 2024-01-06 ASSESSMENT — ACTIVITIES OF DAILY LIVING (ADL)
ADEQUATE_TO_COMPLETE_ADL: YES
HEARING - RIGHT EAR: FUNCTIONAL
FEEDING YOURSELF: NEEDS ASSISTANCE
ASSISTIVE_DEVICE: WALKER
GROOMING: NEEDS ASSISTANCE
HEARING - LEFT EAR: FUNCTIONAL
JUDGMENT_ADEQUATE_SAFELY_COMPLETE_DAILY_ACTIVITIES: YES
TOILETING: NEEDS ASSISTANCE
PATIENT'S MEMORY ADEQUATE TO SAFELY COMPLETE DAILY ACTIVITIES?: YES
WALKS IN HOME: NEEDS ASSISTANCE
DRESSING YOURSELF: NEEDS ASSISTANCE
BATHING: NEEDS ASSISTANCE

## 2024-01-06 ASSESSMENT — PATIENT HEALTH QUESTIONNAIRE - PHQ9
2. FEELING DOWN, DEPRESSED OR HOPELESS: NOT AT ALL
1. LITTLE INTEREST OR PLEASURE IN DOING THINGS: NOT AT ALL
SUM OF ALL RESPONSES TO PHQ9 QUESTIONS 1 & 2: 0

## 2024-01-06 ASSESSMENT — PAIN SCALES - GENERAL
PAINLEVEL_OUTOF10: 0 - NO PAIN
PAINLEVEL_OUTOF10: 0 - NO PAIN

## 2024-01-06 ASSESSMENT — LIFESTYLE VARIABLES
AUDIT-C TOTAL SCORE: 0
HOW OFTEN DO YOU HAVE 6 OR MORE DRINKS ON ONE OCCASION: NEVER
AUDIT-C TOTAL SCORE: 0
HOW OFTEN DO YOU HAVE A DRINK CONTAINING ALCOHOL: NEVER
HOW MANY STANDARD DRINKS CONTAINING ALCOHOL DO YOU HAVE ON A TYPICAL DAY: PATIENT DOES NOT DRINK
SKIP TO QUESTIONS 9-10: 1

## 2024-01-06 ASSESSMENT — COLUMBIA-SUICIDE SEVERITY RATING SCALE - C-SSRS
6. HAVE YOU EVER DONE ANYTHING, STARTED TO DO ANYTHING, OR PREPARED TO DO ANYTHING TO END YOUR LIFE?: NO
1. IN THE PAST MONTH, HAVE YOU WISHED YOU WERE DEAD OR WISHED YOU COULD GO TO SLEEP AND NOT WAKE UP?: NO
2. HAVE YOU ACTUALLY HAD ANY THOUGHTS OF KILLING YOURSELF?: NO

## 2024-01-06 ASSESSMENT — PAIN - FUNCTIONAL ASSESSMENT: PAIN_FUNCTIONAL_ASSESSMENT: 0-10

## 2024-01-06 NOTE — NURSING NOTE
Assessed patient drinking water with straw. Small slow sips. No coughing was witnessed during or after. Patient was able to verbalize no issues. Patient needs reminders to swallow and take small sips

## 2024-01-06 NOTE — H&P
History Of Present Illness  Zo Nunez is a 68 y.o. female presenting from rehab facility with complaint of NG tube displacement and needs peg tube replaced.  Patient presented to ED December 30 for acute stroke.  Found to have dysphagia and discharged with an NG tube while waiting for PEG tube.  Seen yesterday twice in the ER wants to have NG tube replaced and then again in the evening because the NG tube was clogged.  Today she was brought back in as the NG tube was inadvertently removed by patient.  Per ED report facility will not take patient back until she has PEG tube placed.   Patient does have dysphagia however with some time and patient's you can understand her she can answer questions appropriately and express her needs.  She does not want another NG tube she is concerned about why she needs a PEG tube if she is able to eat and drink with speech therapy.  She states that every time she returns to rehab there is some kind of a problem and she is brought back to the hospital.  She wants to consider an alternative rehab.  Sister is at bedside and also notes she has had significant improvement since she stroke onset.     PMH: Hypothyroidism hypertension, recent ischemic CVA w/ dysphagia likely related to embolic source, chronic anemia, A-fib (plan to start Eliquis 1-6-23 given recent stroke)    Past Medical History  She has a past medical history of Acute upper respiratory infection, unspecified, Alcohol dependence, in remission (CMS/HCC) (12/26/2019), Alcohol dependence, in remission (CMS/HCC) (12/26/2019), Constipation, unspecified (12/26/2019), Essential (primary) hypertension (12/26/2019), Other malaise (12/26/2019), Personal history of other diseases of the circulatory system (12/26/2019), Personal history of other diseases of the musculoskeletal system and connective tissue (12/26/2019), Personal history of other specified conditions (12/26/2019), Pressure ulcer of sacral region, stage 2 (CMS/HCC)  (12/26/2019), and Trichomonal vulvovaginitis.    Surgical History  She has a past surgical history that includes Other surgical history (12/26/2019).     Social History  She reports that she has never smoked. She has never used smokeless tobacco. Alcohol use questions deferred to the physician. Drug use questions deferred to the physician.    Family History  No family history on file.     Allergies  Patient has no known allergies.    Review of Systems   Constitutional:  Negative for appetite change.   Respiratory:  Negative for cough, choking and shortness of breath.    Cardiovascular:  Negative for chest pain and leg swelling.   Gastrointestinal:  Negative for abdominal pain.   Neurological:         Dysarthria. Hasn't been allowed to eat as she was told she has dysphagia    All other systems reviewed and are negative.       Physical Exam  Constitutional:       General: She is not in acute distress.     Appearance: Normal appearance.   HENT:      Head: Normocephalic and atraumatic.      Nose: Nose normal.      Mouth/Throat:      Mouth: Mucous membranes are dry.      Pharynx: Oropharynx is clear. No oropharyngeal exudate or posterior oropharyngeal erythema.   Eyes:      Extraocular Movements: Extraocular movements intact.      Conjunctiva/sclera: Conjunctivae normal.      Pupils: Pupils are equal, round, and reactive to light.   Cardiovascular:      Rate and Rhythm: Normal rate and regular rhythm.      Pulses: Normal pulses.      Heart sounds: Normal heart sounds.   Pulmonary:      Effort: Pulmonary effort is normal.      Breath sounds: Normal breath sounds.   Abdominal:      General: Abdomen is flat. Bowel sounds are normal.      Palpations: Abdomen is soft.   Musculoskeletal:         General: Normal range of motion.      Cervical back: Normal range of motion and neck supple.      Right lower leg: No edema.      Left lower leg: No edema.   Skin:     General: Skin is warm and dry.      Capillary Refill: Capillary  refill takes less than 2 seconds.   Neurological:      Mental Status: She is alert and oriented to person, place, and time.      Motor: Weakness present.      Comments: Dysarthria, bedside swallow wnl    Psychiatric:         Mood and Affect: Mood normal.          Last Recorded Vitals  BP (!) 190/94   Pulse 82   Temp 37 °C (98.6 °F)   Resp 18   Wt 76.2 kg (167 lb 15.9 oz)   SpO2 98%     Relevant Results  Results for orders placed or performed during the hospital encounter of 01/06/24 (from the past 24 hour(s))   CBC and Auto Differential   Result Value Ref Range    WBC 9.7 4.4 - 11.3 x10*3/uL    nRBC 0.0 0.0 - 0.0 /100 WBCs    RBC 4.55 4.00 - 5.20 x10*6/uL    Hemoglobin 13.2 12.0 - 16.0 g/dL    Hematocrit 41.2 36.0 - 46.0 %    MCV 91 80 - 100 fL    MCH 29.0 26.0 - 34.0 pg    MCHC 32.0 32.0 - 36.0 g/dL    RDW 14.2 11.5 - 14.5 %    Platelets 229 150 - 450 x10*3/uL    Neutrophils % 74.3 40.0 - 80.0 %    Immature Granulocytes %, Automated 0.2 0.0 - 0.9 %    Lymphocytes % 14.7 13.0 - 44.0 %    Monocytes % 9.3 2.0 - 10.0 %    Eosinophils % 1.0 0.0 - 6.0 %    Basophils % 0.5 0.0 - 2.0 %    Neutrophils Absolute 7.20 1.20 - 7.70 x10*3/uL    Immature Granulocytes Absolute, Automated 0.02 0.00 - 0.70 x10*3/uL    Lymphocytes Absolute 1.42 1.20 - 4.80 x10*3/uL    Monocytes Absolute 0.90 0.10 - 1.00 x10*3/uL    Eosinophils Absolute 0.10 0.00 - 0.70 x10*3/uL    Basophils Absolute 0.05 0.00 - 0.10 x10*3/uL   Basic metabolic panel   Result Value Ref Range    Glucose 85 74 - 99 mg/dL    Sodium 140 136 - 145 mmol/L    Potassium 3.3 (L) 3.5 - 5.3 mmol/L    Chloride 107 98 - 107 mmol/L    Bicarbonate 19 (L) 21 - 32 mmol/L    Anion Gap 17 10 - 20 mmol/L    Urea Nitrogen 16 6 - 23 mg/dL    Creatinine 0.84 0.50 - 1.05 mg/dL    eGFR 75 >60 mL/min/1.73m*2    Calcium 8.9 8.6 - 10.3 mg/dL   Hepatic function panel   Result Value Ref Range    Albumin 3.6 3.4 - 5.0 g/dL    Bilirubin, Total 0.5 0.0 - 1.2 mg/dL    Bilirubin, Direct 0.1 0.0 -  0.3 mg/dL    Alkaline Phosphatase 97 33 - 136 U/L    ALT 11 7 - 45 U/L    AST 18 9 - 39 U/L    Total Protein 6.7 6.4 - 8.2 g/dL   Protime-INR   Result Value Ref Range    Protime 12.8 9.8 - 12.8 seconds    INR 1.1 0.9 - 1.1      XR chest abdomen for OG NG placement    Result Date: 1/5/2024  Interpreted By:  Marcus Anthony, STUDY: XR CHEST ABDOMEN FOR OG NG PLACEMENT; ;  1/5/2024 7:08 pm   INDICATION: Signs/Symptoms:NG placement.   COMPARISON: Chest and abdominal radiographs dated 01/05/2024.   ACCESSION NUMBER(S): [id number]   ORDERING CLINICIAN: SADE DEVI   FINDINGS: Cardiomediastinal silhouette is stable. No evidence of focal consolidation, pleural effusion, or pneumothorax. There has been interval repositioning of the nasogastric tube with its tip now projecting over the more distal stomach. Similar appearance of the bowel with retained oral contrast within the ascending colon.       Interval repositioning of the enteric tube with its tip now projecting over the more distal stomach.     MACRO: None   Signed by: Marcus Anthony 1/5/2024 7:12 PM Dictation workstation:   [id number]    XR chest abdomen for OG NG placement    Result Date: 1/5/2024  Interpreted By:  Marcus Anthony, STUDY: XR CHEST ABDOMEN FOR OG NG PLACEMENT; ;  1/5/2024 6:28 pm   INDICATION: Signs/Symptoms:NG placement.   COMPARISON: Chest abdomen radiograph dated 01/05/2024 and CT abdomen dated 12/06/2022.   ACCESSION NUMBER(S): [id number]   ORDERING CLINICIAN: SADE DEVI   FINDINGS: Cardiomediastinal silhouette is stable, mildly enlarged. There are aortic arch atherosclerotic calcifications. No evidence of focal consolidation, pleural effusion, or pneumothorax. There has been interval replacement of a nasogastric tube which is coiled in the stomach with its tip projecting over the gastric fundus. Consider repositioning. There is residual dense oral contrast in the colon with some gaseous distention of bowel.       Interval replacement of  enteric tube which appears to be coiled in the stomach with its tip projecting over the gastric fundus. Consider repositioning. There is some residual dense oral contrast in the ascending and transverse colon and gaseous distention of the bowel.     MACRO: None   Signed by: Marcus Anthony 1/5/2024 6:36 PM Dictation workstation:   [id number]    XR abdomen 1 view    Result Date: 1/5/2024  STUDY: Abdomen Radiographs;  1/5/2024 11:36 AM INDICATION: Cor [place] placement. COMPARISON: 12/6/2022 CT abdomen and pelvis. ACCESSION NUMBER(S): [id number] ORDERING CLINICIAN: TECHNIQUE:  One view(s) of the abdomen. FINDINGS:  A weighted feeding tube extends through the esophagus and into the upper abdomen.  It is coiling near the midline, likely within the stomach post gastric bypass.  The anatomy of the stomach is not well-defined on this plain film.  Bowel gas pattern is normal without obstruction or ileus.  There appears to be contrast present within the more distal small bowel, likely partially visualized on this study. There are no convincing calculi or abnormal calcifications.  No focal osseous abnormalities.      A feeding tube extends through the esophagus and into the stomach. Probable postoperative changes in the stomach with reduced gastric volume.  Stomach anatomy not evaluated in detail on this exam. Signed by Khang Moody MD      Assessment/Plan   Zo Nunez is a 68 y.o. female presenting from rehab facility with complaint of NG tube displacement and needs peg tube replaced.  Patient presented to ED December 30 for acute stroke.  Found to have dysphagia and discharged with an NG tube while waiting for PEG tube.  Seen yesterday twice in the ER wants to have NG tube replaced and then again in the evening because the NG tube was clogged.  Today she was brought back in as the NG tube was inadvertently removed by patient.  Per ED report facility will not take patient back until she has PEG tube placed.   Patient does  have dysphagia however with some time and patient's you can understand her she can answer questions appropriately and express her needs.  She does not want another NG tube she is concerned about why she needs a PEG tube if she is able to eat and drink with speech therapy.  She states that every time she returns to rehab there is some kind of a problem and she is brought back to the hospital.  She wants to consider an alternative rehab.  Sister is at bedside and also notes she has had significant improvement since she stroke onset.     PMH: Hypothyroidism hypertension, recent ischemic CVA w/ dysphagia likely related to embolic source, chronic anemia, A-fib (plan to start Eliquis 1-6-23 given recent stroke)    Principal Problem:    Dysphagia    Dysarthria   - potassium - 3.3 - iv replacement ordered   - surgery consult, appreciate recs-- mon or tues before peg can be placed   - npo   - heparin subcutaneous  - lr @75/hr   - consult slp eval to determine if peg still needed   - consult dietician for caloric needs/ determine if peg needed   - bedside swallow with nursing passed   - sw consult - wants new rehab       CVA   - plan to restart eliquis 1/6 but ng pulled   - will do subcutaneous heparin   - pt/ot consult while here and before returning to rehab     HTN  -continue home meds      Atrial Fibrillation   -As needed IV metoprolol, unable to swallow oral medications   -Can restart Eliquis on 1/6/24 - but ng tube pulled so heparin for now      Hypothyroidism  -Restart thyroid medication once she can tolerate orals      Chronic anemia   -No s/s of bleeding   -Will monitor     Dvt prophylaxis   - lovenox  - scd/ambulate     Gi prophylaxis   - pantopraxole   - miralax    DC plan  - DC back to facility when medically stable  - pt/ot   - sw consult - wants new rehab     Labs/Testing reviewed:   --- pending peg tube replacement   45 min spent on professional & overall care of this patient    Josselyn Martin, CHARLOTTE-CNP

## 2024-01-06 NOTE — ED PROVIDER NOTES
Chief Complaint   Patient presents with   • Pulled NG tube       HPI:   Zo Nunez is an 68 y.o. female presenting from a nursing home for PEG tube placement.  She was evaluated in the ED yesterday and NG tube was placed in which she proceeded to remove herself today.  The nursing home since her for admission and PEG tube placement.  The  patient has a history of a stroke on December 30 and is anticoagulated with Eliquis.  She is aphasic and she cannot tell me her history.     No Known Allergies:  Past Medical History:   Diagnosis Date   • Acute upper respiratory infection, unspecified     Acute upper respiratory infection   • Alcohol dependence, in remission (CMS/[facility]) 12/26/2019    History of alcoholism   • Alcohol dependence, in remission (CMS/[facility]) 12/26/2019    History of alcohol dependence   • Constipation, unspecified 12/26/2019    Constipation in female   • Essential (primary) hypertension 12/26/2019    HTN (hypertension), benign   • Other malaise 12/26/2019    Physical deconditioning   • Personal history of other diseases of the circulatory system 12/26/2019    History of atrial fibrillation   • Personal history of other diseases of the musculoskeletal system and connective tissue 12/26/2019    History of rhabdomyolysis   • Personal history of other specified conditions 12/26/2019    History of weakness   • Pressure ulcer of sacral region, stage 2 (CMS/[facility]) 12/26/2019    Pressure injury of sacral region, stage 2   • Trichomonal vulvovaginitis     Trichomonal vulvovaginitis     Past Surgical History:   Procedure Laterality Date   • OTHER SURGICAL HISTORY  12/26/2019    Decubitus ulcer debridement     No family history on file.     Physical Exam  Vitals and nursing note reviewed.   Constitutional:       General: She is not in acute distress.     Appearance: She is well-developed.   HENT:      Head: Normocephalic and atraumatic.      Nose: Nose normal.      Comments: Tape present at the external nose from  where NG tube was placed     Mouth/Throat:      Mouth: Mucous membranes are moist.      Pharynx: Oropharynx is clear.   Eyes:      Extraocular Movements: Extraocular movements intact.      Conjunctiva/sclera: Conjunctivae normal.      Pupils: Pupils are equal, round, and reactive to light.   Cardiovascular:      Rate and Rhythm: Normal rate and regular rhythm.      Pulses: Normal pulses.      Heart sounds: Normal heart sounds. No murmur heard.  Pulmonary:      Effort: Pulmonary effort is normal. No respiratory distress.      Breath sounds: Normal breath sounds.   Abdominal:      General: Abdomen is flat. There is no distension.      Palpations: Abdomen is soft. There is no mass.      Tenderness: There is no abdominal tenderness. There is no guarding or rebound.   Musculoskeletal:         General: No swelling. Normal range of motion.      Cervical back: Normal range of motion and neck supple.   Skin:     General: Skin is warm and dry.      Capillary Refill: Capillary refill takes less than 2 seconds.   Neurological:      General: No focal deficit present.      Mental Status: She is alert.      Comments: Patient is oriented to herself and place but is aphasic   Psychiatric:         Mood and Affect: Mood normal.           VS: As documented in the triage note and EMR flowsheet from this visit were reviewed.        Medical Decision Making: This is a 68-year-old patient that presents from the nursing home for PEG tube placement after failed NG tube placement yesterday.  Patient is to be admitted to obs for placement of PEG tube  Diagnoses as of 01/07/24 1618   Dysphagia       Procedures     Social Determinants of Health: Patient is aphasic she is able to speak however cannot explain her situation.  History obtained from nursing home        Discussion of Management with Other Providers:  I discussed the patient/results with: Manjit    Patient was admitted to the [facility] unit for PEG tube placement plan to return to nursing  home tomorrow     Josh Madison PA-C  01/07/24 0717       Josh Madison PA-C  01/07/24 1193

## 2024-01-07 LAB
ANION GAP SERPL CALC-SCNC: 16 MMOL/L (ref 10–20)
APPEARANCE UR: CLEAR
BILIRUB UR STRIP.AUTO-MCNC: NEGATIVE MG/DL
BUN SERPL-MCNC: 14 MG/DL (ref 6–23)
CALCIUM SERPL-MCNC: 8.4 MG/DL (ref 8.6–10.3)
CHLORIDE SERPL-SCNC: 108 MMOL/L (ref 98–107)
CO2 SERPL-SCNC: 16 MMOL/L (ref 21–32)
COLOR UR: YELLOW
CREAT SERPL-MCNC: 0.8 MG/DL (ref 0.5–1.05)
GFR SERPL CREATININE-BSD FRML MDRD: 80 ML/MIN/1.73M*2
GLUCOSE SERPL-MCNC: 80 MG/DL (ref 74–99)
GLUCOSE UR STRIP.AUTO-MCNC: NEGATIVE MG/DL
HOLD SPECIMEN: NORMAL
HYALINE CASTS #/AREA URNS AUTO: ABNORMAL /LPF
KETONES UR STRIP.AUTO-MCNC: ABNORMAL MG/DL
LEUKOCYTE ESTERASE UR QL STRIP.AUTO: ABNORMAL
MUCOUS THREADS #/AREA URNS AUTO: ABNORMAL /LPF
NITRITE UR QL STRIP.AUTO: NEGATIVE
PH UR STRIP.AUTO: 5 [PH]
POTASSIUM SERPL-SCNC: 3.6 MMOL/L (ref 3.5–5.3)
PROT UR STRIP.AUTO-MCNC: NEGATIVE MG/DL
RBC # UR STRIP.AUTO: NEGATIVE /UL
RBC #/AREA URNS AUTO: ABNORMAL /HPF
SODIUM SERPL-SCNC: 136 MMOL/L (ref 136–145)
SP GR UR STRIP.AUTO: 1.01
UROBILINOGEN UR STRIP.AUTO-MCNC: <2 MG/DL
WBC #/AREA URNS AUTO: ABNORMAL /HPF

## 2024-01-07 PROCEDURE — 87086 URINE CULTURE/COLONY COUNT: CPT | Mod: AHULAB | Performed by: EMERGENCY MEDICINE

## 2024-01-07 PROCEDURE — 97161 PT EVAL LOW COMPLEX 20 MIN: CPT | Mod: GP

## 2024-01-07 PROCEDURE — 1100000001 HC PRIVATE ROOM DAILY

## 2024-01-07 PROCEDURE — 99232 SBSQ HOSP IP/OBS MODERATE 35: CPT | Performed by: INTERNAL MEDICINE

## 2024-01-07 PROCEDURE — 2500000004 HC RX 250 GENERAL PHARMACY W/ HCPCS (ALT 636 FOR OP/ED): Performed by: INTERNAL MEDICINE

## 2024-01-07 PROCEDURE — 99222 1ST HOSP IP/OBS MODERATE 55: CPT | Performed by: SURGERY

## 2024-01-07 PROCEDURE — 2500000004 HC RX 250 GENERAL PHARMACY W/ HCPCS (ALT 636 FOR OP/ED): Performed by: NURSE PRACTITIONER

## 2024-01-07 PROCEDURE — 80048 BASIC METABOLIC PNL TOTAL CA: CPT | Performed by: NURSE PRACTITIONER

## 2024-01-07 PROCEDURE — 97165 OT EVAL LOW COMPLEX 30 MIN: CPT | Mod: GO

## 2024-01-07 PROCEDURE — 81001 URINALYSIS AUTO W/SCOPE: CPT | Performed by: EMERGENCY MEDICINE

## 2024-01-07 PROCEDURE — C9113 INJ PANTOPRAZOLE SODIUM, VIA: HCPCS | Performed by: NURSE PRACTITIONER

## 2024-01-07 RX ORDER — HYDRALAZINE HYDROCHLORIDE 20 MG/ML
10 INJECTION INTRAMUSCULAR; INTRAVENOUS EVERY 6 HOURS PRN
Status: DISCONTINUED | OUTPATIENT
Start: 2024-01-07 | End: 2024-01-10 | Stop reason: HOSPADM

## 2024-01-07 RX ADMIN — HEPARIN SODIUM 5000 UNITS: 5000 INJECTION INTRAVENOUS; SUBCUTANEOUS at 06:13

## 2024-01-07 RX ADMIN — HEPARIN SODIUM 5000 UNITS: 5000 INJECTION INTRAVENOUS; SUBCUTANEOUS at 22:01

## 2024-01-07 RX ADMIN — HYDRALAZINE HYDROCHLORIDE 10 MG: 20 INJECTION INTRAMUSCULAR; INTRAVENOUS at 22:00

## 2024-01-07 RX ADMIN — SODIUM CHLORIDE, POTASSIUM CHLORIDE, SODIUM LACTATE AND CALCIUM CHLORIDE 75 ML/HR: 600; 310; 30; 20 INJECTION, SOLUTION INTRAVENOUS at 10:18

## 2024-01-07 RX ADMIN — HYDRALAZINE HYDROCHLORIDE 10 MG: 20 INJECTION INTRAMUSCULAR; INTRAVENOUS at 13:40

## 2024-01-07 RX ADMIN — HEPARIN SODIUM 5000 UNITS: 5000 INJECTION INTRAVENOUS; SUBCUTANEOUS at 13:34

## 2024-01-07 RX ADMIN — PANTOPRAZOLE SODIUM 40 MG: 40 INJECTION, POWDER, FOR SOLUTION INTRAVENOUS at 06:13

## 2024-01-07 ASSESSMENT — COGNITIVE AND FUNCTIONAL STATUS - GENERAL
STANDING UP FROM CHAIR USING ARMS: A LITTLE
CLIMB 3 TO 5 STEPS WITH RAILING: A LOT
EATING MEALS: A LITTLE
TURNING FROM BACK TO SIDE WHILE IN FLAT BAD: A LITTLE
MOVING FROM LYING ON BACK TO SITTING ON SIDE OF FLAT BED WITH BEDRAILS: A LITTLE
DAILY ACTIVITIY SCORE: 20
TOILETING: A LITTLE
MOVING TO AND FROM BED TO CHAIR: A LITTLE
WALKING IN HOSPITAL ROOM: A LITTLE
MOBILITY SCORE: 24
MOBILITY SCORE: 17
PERSONAL GROOMING: A LITTLE
HELP NEEDED FOR BATHING: A LITTLE
DAILY ACTIVITIY SCORE: 24

## 2024-01-07 ASSESSMENT — ACTIVITIES OF DAILY LIVING (ADL)
ADL_ASSISTANCE: INDEPENDENT
LACK_OF_TRANSPORTATION: NO
ADL_ASSISTANCE: INDEPENDENT

## 2024-01-07 ASSESSMENT — PAIN SCALES - GENERAL
PAINLEVEL_OUTOF10: 0 - NO PAIN

## 2024-01-07 ASSESSMENT — PAIN - FUNCTIONAL ASSESSMENT: PAIN_FUNCTIONAL_ASSESSMENT: 0-10

## 2024-01-07 NOTE — PROGRESS NOTES
From  acute rehab, patient wants new facility      01/05/24 3603   Current Planned Discharge Disposition   Current Planned Discharge Disposition Rehab

## 2024-01-07 NOTE — CARE PLAN
The patient's goals for the shift include comfort    The clinical goals for the shift include safety    Over the shift, the patient made progress toward the following goals.

## 2024-01-07 NOTE — PROGRESS NOTES
Transitional Care Coordination Progress Note:  Plan per Medical/Surgical team: treatment of NGT displaced with difficult reinsertion, possible NEW PEG with NPO, IV fluids via PICC line, surgery consult & therapies pending  Status: observation  Payor source: medicare A/B  Discharge disposition: from  acute rehab, NEW facility desired  Discussed options. Referral sent to [facility] acute rehab in [place]. Family & patient provided SNF list to review for new SNF choices. Plan to use previous within 30 day inpatient stay for SNF placement under traditional medicare  Potential Barriers: difficult NGT replacement, speech eval pending MONDAY to evaluate for puree diet with thickened liquids as alternative to a new PEG placement  ADOD: 1/9/2024  MERY Bates RN, BSN Transitional Care Coordinator ED# [phone]     @ 1220 Per Surgery, Unfortunately due to her prior Stef-en-Y gastric bypass she is not a candidate for a PEG tube. Awaiting Speech recs.     01/07/24 1202   Discharge Planning   Living Arrangements Children   Support Systems Children   Assistance Needed speech eval Monday   Type of Residence Inpatient rehab facility   Do you have animals or pets at home? No   Home or Post Acute Services Post acute facilities (Rehab/SNF/etc)   Type of Post Acute Facility Services Rehab   Patient expects to be discharged to: from  acute rehab, NEW facility   Does the patient need discharge transport arranged? Yes   RoundTrip coordination needed? Yes   Has discharge transport been arranged? No   Financial Resource Strain   How hard is it for you to pay for the very basics like food, housing, medical care, and heating? Not hard   Housing Stability   In the last 12 months, was there a time when you were not able to pay the mortgage or rent on time? N   In the last 12 months, how many places have you lived? 1   In the last 12 months, was there a time when you did not have a steady place to sleep or slept in a shelter (including now)?  N   Transportation Needs   In the past 12 months, has lack of transportation kept you from medical appointments or from getting medications? no   In the past 12 months, has lack of transportation kept you from meetings, work, or from getting things needed for daily living? No   Patient Choice   Provider Choice list and CMS website (https://medicare.gov/care-compare#search) for post-acute Quality and Resource Measure Data were provided and reviewed with: Patient;Family   Patient / Family choosing to utilize agency / facility established prior to hospitalization No

## 2024-01-07 NOTE — PROGRESS NOTES
Physical Therapy    Physical Therapy Evaluation    Patient Name: Zo Nunez  MRN: [id number]  Today's Date: 1/7/2024   Time Calculation  Start Time: 0854  Stop Time: 0918  Time Calculation (min): 24 min    Assessment/Plan   PT Assessment  PT Assessment Results: Decreased strength, Decreased coordination  Rehab Prognosis: Excellent  Evaluation/Treatment Tolerance: Patient tolerated treatment well  Medical Staff Made Aware: Yes  Strengths: Ability to acquire knowledge, Rehab experience  End of Session Communication: Bedside nurse  Assessment Comment: Pt presents today with deficits in strength and coodination. continued PT during the current admssion would benefit the pt to improve strength, balance, and coordination.  End of Session Patient Position: Bed, 2 rail up, Alarm on  IP OR SWING BED PT PLAN  Inpatient or Swing Bed: Inpatient  PT Plan  Treatment/Interventions: Bed mobility, Transfer training, Gait training, Stair training, Balance training, Strengthening, Therapeutic exercise, Therapeutic activity, Home exercise program  PT Plan: Skilled PT  PT Frequency: 4 times per week  PT Discharge Recommendations: High intensity level of continued care  PT Recommended Transfer Status: Contact guard  PT - OK to Discharge: Yes (Per PT POC)      Subjective   General Visit Information:  General  Reason for Referral: 67 y/o F presenting from rehab facility with c/o NG tube displacement after recent admission for stroke 12/20/23. Pt found to have dysphasia.  Referred By: JAZLYN Wolfe  Past Medical History Relevant to Rehab: Hypothyroidism, HTN, CVA, dysphagia, chronic anemia, a-fib  Family/Caregiver Present: No  Prior to Session Communication: Bedside nurse  Patient Position Received: Bed, 2 rail up, Alarm on  Preferred Learning Style: verbal, visual  General Comment: Pt supine in bed upon PT arrival. Cleared to participate with bedside nurse, and agreeable to PT evaluation.  Home Living:  Home Living  Type of Home: [place] (Pt from  rehab facility)  Home Adaptive Equipment: None  Home Layout: Two level  Home Access: Stairs to enter with rails  Entrance Stairs-Rails: Both  Entrance Stairs-Number of Steps: Full flight  Bathroom Shower/Tub: Tub/shower unit  Bathroom Toilet: Standard  Bathroom Equipment: None  Prior Level of Function:  Prior Function Per Pt/Caregiver Report  Level of [place]: Independent with ADLs and functional transfers, Independent with homemaking with ambulation  ADL Assistance: Independent  Homemaking Assistance: Independent  Ambulatory Assistance: Independent (No AD)  Prior Function Comments: Pt reports IND in I/ADLs  without an assitive device prior to rehab admission. At rehab facility, pt reports IND ADLs transfers, and ambulation without an assistive device.  Precautions:  Precautions  Medical Precautions: Fall precautions    Objective   Pain:  Pain Assessment  Pain Assessment: 0-10  Pain Score: 0 - No pain  Cognition:  Cognition  Orientation Level:  (Oriented to person, place, and situation)    General Assessments:  General Observation  General Observation: Pt demonstrates decreased coordination on RUE/RLE compared to LUE/LLE    Functional Assessments:  Bed Mobility  Bed Mobility: Yes  Bed Mobility 1  Bed Mobility 1: Supine to sitting  Level of Assistance 1: Close supervision  Bed Mobility Comments 1: HOB elevated. Pt able to progress BLE off EOB and use bed rail to bring trunk into upright sitting position.  Bed Mobility 2  Bed Mobility  2: Sitting to supine  Level of Assistance 2: Close supervision  Bed Mobility Comments 2: Pt able to lift BLE into bed with good trunk control on descent to bed. HOB flat.    Transfers  Transfer: Yes  Transfer 1  Transfer From 1: Bed to  Transfer to 1: Stand  Technique 1: Sit to stand  Transfer Device 1: Gait belt  Transfer Level of Assistance 1: Contact guard  Trials/Comments 1: Good BLE positioning on floor for increased stibility. Pt able to intiate UE push from bed to  a  reasonable amount of time.  Transfers 2  Transfer From 2: Stand to  Transfer to 2: Bed  Technique 2: Stand to sit  Transfer Device 2: Gait belt  Transfer Level of Assistance 2: Close supervision  Trials/Comments 2: Good BLE positioning against bed before attempting to sit. Pt able to intiate UE reach for bed  upon sitting. Good eccentric control on descent to bed.    Ambulation/Gait Training  Ambulation/Gait Training Performed: Yes  Ambulation/Gait Training 1  Surface 1: Level tile  Device 1: No device  Gait Support Devices: Gait belt  Assistance 1: Contact guard  Comments/Distance (ft) 1: About 180 ft. Good mckayla with decreased step length. Good reciprocol arm swing. Decreased DF on RLE.    Stairs  Stairs: No  Extremity/Trunk Assessments:  RLE   RLE : Exceptions to WFL  Strength RLE  R Hip Flexion: 3+/5  R Knee Extension: 4-/5  R Ankle Dorsiflexion: 4/5  R Ankle Plantar Flexion: 3-/5  LLE   LLE : Exceptions to WFL  Strength LLE  L Hip Flexion: 4/5  L Knee Extension: 4/5  L Ankle Dorsiflexion: 4/5  L Ankle Plantar Flexion: 3+/5  Outcome Measures:  [facility] Basic Mobility  Turning from your back to your side while in a flat bed without using bedrails: A little  Moving from lying on your back to sitting on the side of a flat bed without using bedrails: A little  Moving to and from bed to chair (including a wheelchair): A little  Standing up from a chair using your arms (e.g. wheelchair or bedside chair): A little  To walk in hospital room: A little  Climbing 3-5 steps with railing: A lot  Basic Mobility - Total Score: 17    Encounter Problems       Encounter Problems (Active)       Balance       Goal 1 (Progressing)       Start:  01/07/24    Expected End:  01/21/24       Pt performs all sitting and standing balance IND            Mobility       LTG - Patient will navigate a full flight of steps with bilateral HR support and CGA (Not Progressing)       Start:  01/07/24    Expected End:  01/21/24            STG - Patient  will ambulate (Progressing)       Start:  01/07/24    Expected End:  01/21/24       300 ft with close supervision            PT Problem       PT Goal 1 (Not Progressing)       Start:  01/07/24    Expected End:  01/21/24       Pt demonstrates IND In performing 2 sets of 15 reps of prescribed BLE HEP            Safety       LTG - Patient will demonstrate safety requirements appropriate to situation/environment       Start:  01/07/24    Expected End:  01/10/24               Transfers       STG - Patient to transfer to and from sit to supine (Progressing)       Start:  01/07/24    Expected End:  01/21/24       IND         STG - Patient will transfer sit to and from stand (Progressing)       Start:  01/07/24    Expected End:  01/21/24       With distant supervision                Education Documentation  Body Mechanics, taught by Mario Torres, PT at 1/7/2024  9:56 AM.  Learner: Patient  Readiness: Acceptance  Method: Explanation, Demonstration  Response: Verbalizes Understanding    Mobility Training, taught by Mario Torres, PT at 1/7/2024  9:56 AM.  Learner: Patient  Readiness: Acceptance  Method: Explanation, Demonstration  Response: Verbalizes Understanding    Education Comments  No comments found.

## 2024-01-07 NOTE — CARE PLAN
Problem: Safety - Adult  Goal: Free from fall injury  1/7/2024 0704 by Bella Arzate RN  Outcome: Progressing   The patient's goals for the shift include comfort    The clinical goals for the shift include safety

## 2024-01-07 NOTE — PROGRESS NOTES
Occupational Therapy    Evaluation    Patient Name: Zo Nunez  MRN: [id number]  Today's Date: 1/7/2024  Time Calculation  Start Time: 1028  Stop Time: 1048  Time Calculation (min): 20 min     Assessment:  OT Assessment: 67 yo presented from [facility] for NG feeding tube dysfunction. Recnetly dc'd to [facility] after  CVA, pt noted to have much improvedment regarding speech, cognition and overall function. Continues to have minor residual deficits in R UE FMC and overall ADL/IADL and functional mobility management  Prognosis: Excellent  Evaluation/Treatment Tolerance: Patient tolerated treatment well  End of Session Communication: Bedside nurse  End of Session Patient Position: Bed, 2 rail up, Alarm on  OT Assessment Results: Decreased fine motor control, Decreased IADLs  Prognosis: Excellent  Evaluation/Treatment Tolerance: Patient tolerated treatment well  Strengths: Ability to acquire knowledge, Attitude of self, Coping skills, Support of Caregivers  Plan:  Treatment Interventions: ADL retraining, Functional transfer training, Visual perceptual retraining, UE strengthening/ROM, Endurance training, Patient/family training, Equipment evaluation/education, Neuromuscular reeducation, Fine motor coordination activities, Compensatory technique education  OT Frequency: 3 times per week  OT Discharge Recommendations: Low intensity level of continued care  Equipment Recommended upon Discharge:  (shower chair if pt does not already have)  OT - OK to Discharge: Yes  Treatment Interventions: ADL retraining, Functional transfer training, Visual perceptual retraining, UE strengthening/ROM, Endurance training, Patient/family training, Equipment evaluation/education, Neuromuscular reeducation, Fine motor coordination activities, Compensatory technique education    Subjective   Current Problem:  No diagnosis found.  General:  General  Reason for Referral: 67 yo presented from [facility] with NG tube malfunction. Recent CVA and dc'd to [facility] last  week.  Referred By: Yaneth  Past Medical History Relevant to Rehab: Hypothyroidism, HTN, CVA, dysphagia, chronic anemia, a-fib  Family/Caregiver Present: No  Prior to Session Communication: Bedside nurse  Patient Position Received: Bed, 2 rail up, Alarm on  Preferred Learning Style: verbal, visual  General Comment: pt supine, very pleasant and eager to participate as able; dysarthric speech though notably improved speech since last time this writer saw pt for OT tx session last week-- pt was also continuously drooling that session which was not observed today  Precautions:  Medical Precautions: Fall precautions  Vital Signs:     Pain:       Objective   Cognition:  Overall Cognitive Status: Within Functional Limits  Orientation Level: Oriented X4  Attention: Within Functional Limits  Problem Solving: Within Functional Limits  Safety/Judgement: Within Functional Limits  Processing Speed: Within funtional limits    Home Living:  Type of Home: Cond (Pt from rehab facility)  Home Adaptive Equipment: None  Home Layout: Two level  Home Access: Stairs to enter with rails  Entrance Stairs-Rails: Both  Bathroom Shower/Tub: Tub/shower unit  Bathroom Toilet: Standard  Bathroom Equipment: None  Prior Function:  Level of [place]: Independent with ADLs and functional transfers, Independent with homemaking with ambulation  ADL Assistance: Independent  Homemaking Assistance: Independent  Ambulatory Assistance: Independent (No AD)  Prior Function Comments: Pt reports IND in I/ADLs  without an assitive device prior to rehab admission. At rehab facility, pt reports IND ADLs transfers, and ambulation without an assistive device.  IADL History:     ADL:  LE Dressing Assistance: Independent  LE Dressing Deficit: Don/doff L sock, Don/doff R sock, Thread LLE into pants, Thread RLE into pants (completed without deficits while seated EOB; integrates BUEs into ADL management tasks wfls)  Activity Tolerance:  Endurance: Endurance does not  limit participation in activity  Bed Mobility/Transfers: Bed Mobility  Bed Mobility: Yes  Bed Mobility 1  Bed Mobility 1: Supine to sitting  Level of Assistance 1: Modified independent  Bed Mobility Comments 1:  (hob elevated)  Bed Mobility 2  Bed Mobility  2: Sitting to supine  Level of Assistance 2: Modified independent    Transfers  Transfer: Yes  Transfer 1  Transfer From 1: Bed to  Transfer to 1: Stand  Technique 1: Sit to stand  Transfer Level of Assistance 1: Close supervision  Transfers 2  Transfer From 2: Stand to  Transfer to 2: Bed  Technique 2: Stand to sit  Transfer Level of Assistance 2: Close supervision  Trials/Comments 2: good eccentric control      Ambulation/Gait Training:  Ambulation/Gait Training  Ambulation/Gait Training Performed: Yes  Ambulation/Gait Training 1  Assistance 1: Close supervision  Comments/Distance (ft) 1: pt ambulated at bed side and performed 10 marches in place without any observed balance or safety deficits. No use of AD, good safety awareness and hand placement during transfers  Sitting Balance:  Static Sitting Balance  Static Sitting-Level of Assistance: Independent  Dynamic Sitting Balance  Dynamic Sitting-Balance: Forward lean (independent; able to bed forward at waist for distal LE ADLs without any observed balance impairments)  Standing Balance:  Static Standing Balance  Static Standing-Level of Assistance: Close supervision  Dynamic Standing Balance  Dynamic Standing-Balance:  (close supervision)   Vision:  Vision - Basic Assessment  Current Vision:  (appears intact, hand eye coordination wfls)  Sensation:  Light Touch: No apparent deficits  Strength:  Strength Comments: BUE AROM and strength appears symmetrical and WFLs  Perception:  Inattention/Neglect: Appears intact  Coordination:  Movements are Fluid and Coordinated: No  Coordination Comment: Pt demonstrates slight delay in speed/accuracy of finger to thumb opposition; mild decline in fine motor coordination in R  hand- instructed pt to continue to use dominant R UE for all ADLs   Hand Function:  Gross Grasp: Functional  Coordination: Impaired  Extremities:   RUE   RUE :  (R UE AROM WFLs; strength grossly at least 4/5 throughout and symmetrical to L UE; slightly decr. FMC in R hand), LUE   LUE: Within Functional Limits,  , and      Outcome Measures:  [facility] Daily Activity  Putting on and taking off regular lower body clothing: None  Bathing (including washing, rinsing, drying): A little  Putting on and taking off regular upper body clothing: None  Toileting, which includes using toilet, bedpan or urinal: A little  Taking care of personal grooming such as brushing teeth: A little  Eating Meals: A little  Daily Activity - Total Score: 20        Education Documentation  ADL Training, taught by Chandler Fried OT at 1/7/2024 11:54 AM.  Learner: Patient  Readiness: Acceptance  Method: Explanation  Response: Verbalizes Understanding    Education Comments  No comments found.      Goals:  Encounter Problems       Encounter Problems (Active)       ADLs       Patient will perform UB and LB bathing  with modified independent level of assistance and shower chair.       Start:  01/07/24    Expected End:  01/21/24            Patient will complete daily grooming tasks  with modified independent level of assistance and PRN adaptive equipment while standing.       Start:  01/07/24    Expected End:  01/21/24            Patient will complete toileting including hygiene clothing management/hygiene with modified independent level of assistance and raised toilet seat and grab bars.       Start:  01/07/24    Expected End:  01/21/24               BALANCE       Pt will maintain dynamic standing balance during ADL task with modified independent level of assistance in order to demonstrate decreased risk of falling and improved postural control.       Start:  01/07/24    Expected End:  01/21/24               EXERCISE/STRENGTHENING       Patient will  complete RUE exercises for fine motor coordination  in order to improve strength and activity for ADL performance with dominant R UE       Start:  01/07/24    Expected End:  01/21/24               MOBILITY       Patient will perform Functional mobility  Household distances/Community Distances with modified independent level of assistance and least restrictive device in order to improve safety and functional mobility.       Start:  01/07/24    Expected End:  01/21/24               TRANSFERS       Patient will complete functional transfer to chair vs bathroom toilet with least restrictive device with modified independent level of assistance.       Start:  01/07/24    Expected End:  01/21/24

## 2024-01-07 NOTE — PROGRESS NOTES
01/07/24 1201   ACS Disability Status   Are you deaf or do you have serious difficulty hearing? N   Are you blind or do you have serious difficulty seeing, even when wearing glasses? N   Because of a physical, mental, or emotional condition, do you have serious difficulty concentrating, remembering, or making decisions? (5 years old or older) Y  (remote CVA, severe speech & swallow deficit)   Do you have serious difficulty walking or climbing stairs? Y   Do you have serious difficulty dressing or bathing? N   Because of a physical, mental, or emotional condition, do you have serious difficulty doing errands alone such as visiting the doctor? Y

## 2024-01-07 NOTE — CONSULTS
Reason For Consult  PEG tube    Assessment/Plan   Patient with recent CVA event with some apparent dysphagia.  Surgery consulted for PEG tube.  Unfortunately due to her prior Stef-en-Y gastric bypass she is not a candidate for a PEG tube.  I would ask our speech and swallow team to reevaluate her needs for alternative feeding because she tells me that she was on a puréed diet for a time after the stroke.  If it is determined that she cannot eat at all then we would have to look into surgical gastrostomy tubes and the gastric remnant      History Of Present Illness  Zo Nunez is a 68 y.o. female presenting with nutritional compromise secondary to recent stroke.  Patient had gone to a rehab facility with [facility] but it sounds like she has had some problems with clogged tubes and the tube being pulled out.  Surgery consulted for PEG tube placement.     Past Medical History  She has a past medical history of Acute upper respiratory infection, unspecified, Alcohol dependence, in remission (CMS/HCC) (12/26/2019), Alcohol dependence, in remission (CMS/HCC) (12/26/2019), Constipation, unspecified (12/26/2019), Essential (primary) hypertension (12/26/2019), Other malaise (12/26/2019), Personal history of other diseases of the circulatory system (12/26/2019), Personal history of other diseases of the musculoskeletal system and connective tissue (12/26/2019), Personal history of other specified conditions (12/26/2019), Pressure ulcer of sacral region, stage 2 (CMS/HCC) (12/26/2019), and Trichomonal vulvovaginitis.    Surgical History  She has a past surgical history that includes Other surgical history (12/26/2019).  Patient tells me she had a Stef-en-Y gastric bypass surgery done at least 15 years ago.  Also had her gallbladder removed.    Social History  She reports that she has never smoked. She has never used smokeless tobacco. Alcohol use questions deferred to the physician. Drug use questions deferred to the  "physician.    Family History  No family history on file.     Allergies  Patient has no known allergies.    Review of Systems     Physical Exam  Patient with dysphasia  Regular rate  Lungs clear  Abdomen soft.  Upper midline laparotomy incision noted    Last Recorded Vitals  Blood pressure (!) 206/106, pulse 95, temperature 36.5 °C (97.7 °F), temperature source Temporal, resp. rate 18, height 1.65 m (5' 4.96\"), weight 76.4 kg (168 lb 6.9 oz), SpO2 97 %.    Relevant Results  Lab Results   Component Value Date    WBC 9.7 01/06/2024    HGB 13.2 01/06/2024    HCT 41.2 01/06/2024    MCV 91 01/06/2024     01/06/2024         Sodium   Date Value Ref Range Status   01/07/2024 136 136 - 145 mmol/L Final     Lactate   Date Value Ref Range Status   12/30/2023 1.0 0.4 - 2.0 mmol/L Final     Chloride   Date Value Ref Range Status   01/07/2024 108 (H) 98 - 107 mmol/L Final     Urea Nitrogen   Date Value Ref Range Status   01/07/2024 14 6 - 23 mg/dL Final         Lab Results   Component Value Date    K 3.6 01/07/2024    CALCIUM 8.4 (L) 01/07/2024      No results found for this or any previous visit from the past 3 days.          I spent 30 minutes in the professional and overall care of this patient.        "

## 2024-01-07 NOTE — PROGRESS NOTES
"Zo Nunez is a 68 y.o. female on day 0 of admission presenting with Dysphagia.    Subjective   Doing ok needs peg tube replacement       Objective     Physical Exam  Constitutional:       Appearance: Normal appearance.   HENT:      Head: Normocephalic.      Nose: Nose normal.      Mouth/Throat:      Mouth: Mucous membranes are dry.   Cardiovascular:      Rate and Rhythm: Normal rate and regular rhythm.      Pulses: Normal pulses.   Pulmonary:      Effort: Pulmonary effort is normal.      Breath sounds: Normal breath sounds.   Abdominal:      General: Abdomen is flat. Bowel sounds are normal.      Palpations: Abdomen is soft.   Musculoskeletal:      Cervical back: Normal range of motion.   Skin:     General: Skin is warm.      Capillary Refill: Capillary refill takes less than 2 seconds.   Neurological:      General: No focal deficit present.      Mental Status: She is alert.         Last Recorded Vitals  Blood pressure 174/88, pulse 82, temperature 36.9 °C (98.4 °F), temperature source Temporal, resp. rate 18, height 1.65 m (5' 4.96\"), weight 76.4 kg (168 lb 6.9 oz), SpO2 98 %.  Intake/Output last 3 Shifts:  I/O last 3 completed shifts:  In: 2305 (30.2 mL/kg) [I.V.:1305 (17.1 mL/kg); IV Piggyback:1000]  Out: - (0 mL/kg)   Weight: 76.4 kg     Relevant Results  Results for orders placed or performed during the hospital encounter of 01/06/24 (from the past 24 hour(s))   CBC and Auto Differential   Result Value Ref Range    WBC 9.7 4.4 - 11.3 x10*3/uL    nRBC 0.0 0.0 - 0.0 /100 WBCs    RBC 4.55 4.00 - 5.20 x10*6/uL    Hemoglobin 13.2 12.0 - 16.0 g/dL    Hematocrit 41.2 36.0 - 46.0 %    MCV 91 80 - 100 fL    MCH 29.0 26.0 - 34.0 pg    MCHC 32.0 32.0 - 36.0 g/dL    RDW 14.2 11.5 - 14.5 %    Platelets 229 150 - 450 x10*3/uL    Neutrophils % 74.3 40.0 - 80.0 %    Immature Granulocytes %, Automated 0.2 0.0 - 0.9 %    Lymphocytes % 14.7 13.0 - 44.0 %    Monocytes % 9.3 2.0 - 10.0 %    Eosinophils % 1.0 0.0 - 6.0 %    " Basophils % 0.5 0.0 - 2.0 %    Neutrophils Absolute 7.20 1.20 - 7.70 x10*3/uL    Immature Granulocytes Absolute, Automated 0.02 0.00 - 0.70 x10*3/uL    Lymphocytes Absolute 1.42 1.20 - 4.80 x10*3/uL    Monocytes Absolute 0.90 0.10 - 1.00 x10*3/uL    Eosinophils Absolute 0.10 0.00 - 0.70 x10*3/uL    Basophils Absolute 0.05 0.00 - 0.10 x10*3/uL   Basic metabolic panel   Result Value Ref Range    Glucose 85 74 - 99 mg/dL    Sodium 140 136 - 145 mmol/L    Potassium 3.3 (L) 3.5 - 5.3 mmol/L    Chloride 107 98 - 107 mmol/L    Bicarbonate 19 (L) 21 - 32 mmol/L    Anion Gap 17 10 - 20 mmol/L    Urea Nitrogen 16 6 - 23 mg/dL    Creatinine 0.84 0.50 - 1.05 mg/dL    eGFR 75 >60 mL/min/1.73m*2    Calcium 8.9 8.6 - 10.3 mg/dL   Hepatic function panel   Result Value Ref Range    Albumin 3.6 3.4 - 5.0 g/dL    Bilirubin, Total 0.5 0.0 - 1.2 mg/dL    Bilirubin, Direct 0.1 0.0 - 0.3 mg/dL    Alkaline Phosphatase 97 33 - 136 U/L    ALT 11 7 - 45 U/L    AST 18 9 - 39 U/L    Total Protein 6.7 6.4 - 8.2 g/dL   Protime-INR   Result Value Ref Range    Protime 12.8 9.8 - 12.8 seconds    INR 1.1 0.9 - 1.1   Basic metabolic panel   Result Value Ref Range    Glucose 80 74 - 99 mg/dL    Sodium 136 136 - 145 mmol/L    Potassium 3.6 3.5 - 5.3 mmol/L    Chloride 108 (H) 98 - 107 mmol/L    Bicarbonate 16 (L) 21 - 32 mmol/L    Anion Gap 16 10 - 20 mmol/L    Urea Nitrogen 14 6 - 23 mg/dL    Creatinine 0.80 0.50 - 1.05 mg/dL    eGFR 80 >60 mL/min/1.73m*2    Calcium 8.4 (L) 8.6 - 10.3 mg/dL   Urinalysis with Reflex Culture and Microscopic   Result Value Ref Range    Color, Urine Yellow Straw, Yellow    Appearance, Urine Clear Clear    Specific Gravity, Urine 1.014 1.005 - 1.035    pH, Urine 5.0 5.0, 5.5, 6.0, 6.5, 7.0, 7.5, 8.0    Protein, Urine NEGATIVE NEGATIVE mg/dL    Glucose, Urine NEGATIVE NEGATIVE mg/dL    Blood, Urine NEGATIVE NEGATIVE    Ketones, Urine 5 (TRACE) (A) NEGATIVE mg/dL    Bilirubin, Urine NEGATIVE NEGATIVE    Urobilinogen, Urine  <2.0 <2.0 mg/dL    Nitrite, Urine NEGATIVE NEGATIVE    Leukocyte Esterase, Urine SMALL (1+) (A) NEGATIVE   Microscopic Only, Urine   Result Value Ref Range    WBC, Urine 1-5 1-5, NONE /HPF    RBC, Urine NONE NONE, 1-2, 3-5 /HPF    Mucus, Urine 2+ Reference range not established. /LPF    Hyaline Casts, Urine 1+ (A) NONE /LPF       Imaging Results      Medications:  amLODIPine, 10 mg, oral, Daily  aspirin, 81 mg, oral, Daily  atorvastatin, 80 mg, oral, Nightly  cyanocobalamin, 1,000 mcg, intramuscular, q30 days  heparin (porcine), 5,000 Units, subcutaneous, q8h  spironolactone, 25 mg, oral, Daily   And  hydroCHLOROthiazide, 25 mg, oral, Daily  levothyroxine, 25 mcg, oral, Daily  lisinopril, 40 mg, oral, Daily  metoprolol succinate XL, 50 mg, oral, Daily  montelukast, 10 mg, oral, Nightly  NIFEdipine ER, 30 mg, oral, Daily  pantoprazole, 40 mg, oral, Daily before breakfast   Or  pantoprazole, 40 mg, intravenous, Daily before breakfast  polyethylene glycol, 17 g, oral, Daily       PRN medications: acetaminophen **OR** acetaminophen **OR** acetaminophen, albuterol, fluticasone, ondansetron ODT **OR** ondansetron, sodium chloride     Assessment/Plan   PEG tube malfunction  -pending replacement surgery consulted    DVT Prophylaxis:  Heparin subq    Charlee Wolfe MD  [facility]

## 2024-01-08 ENCOUNTER — APPOINTMENT (OUTPATIENT)
Dept: RADIOLOGY | Facility: HOSPITAL | Age: 69
DRG: 057 | End: 2024-01-08
Payer: MEDICARE

## 2024-01-08 LAB
ANION GAP SERPL CALC-SCNC: 16 MMOL/L (ref 10–20)
BACTERIA UR CULT: NORMAL
BUN SERPL-MCNC: 11 MG/DL (ref 6–23)
CALCIUM SERPL-MCNC: 8.5 MG/DL (ref 8.6–10.3)
CHLORIDE SERPL-SCNC: 102 MMOL/L (ref 98–107)
CO2 SERPL-SCNC: 23 MMOL/L (ref 21–32)
CREAT SERPL-MCNC: 0.72 MG/DL (ref 0.5–1.05)
ERYTHROCYTE [DISTWIDTH] IN BLOOD BY AUTOMATED COUNT: 13.4 % (ref 11.5–14.5)
GFR SERPL CREATININE-BSD FRML MDRD: >90 ML/MIN/1.73M*2
GLUCOSE SERPL-MCNC: 75 MG/DL (ref 74–99)
HCT VFR BLD AUTO: 37.2 % (ref 36–46)
HGB BLD-MCNC: 12.4 G/DL (ref 12–16)
MCH RBC QN AUTO: 28.8 PG (ref 26–34)
MCHC RBC AUTO-ENTMCNC: 33.3 G/DL (ref 32–36)
MCV RBC AUTO: 86 FL (ref 80–100)
NRBC BLD-RTO: 0 /100 WBCS (ref 0–0)
PLATELET # BLD AUTO: 243 X10*3/UL (ref 150–450)
POTASSIUM SERPL-SCNC: 2.6 MMOL/L (ref 3.5–5.3)
POTASSIUM SERPL-SCNC: 3.3 MMOL/L (ref 3.5–5.3)
RBC # BLD AUTO: 4.31 X10*6/UL (ref 4–5.2)
SODIUM SERPL-SCNC: 138 MMOL/L (ref 136–145)
WBC # BLD AUTO: 4.4 X10*3/UL (ref 4.4–11.3)

## 2024-01-08 PROCEDURE — 2500000005 HC RX 250 GENERAL PHARMACY W/O HCPCS: Performed by: INTERNAL MEDICINE

## 2024-01-08 PROCEDURE — C9113 INJ PANTOPRAZOLE SODIUM, VIA: HCPCS | Performed by: NURSE PRACTITIONER

## 2024-01-08 PROCEDURE — 80048 BASIC METABOLIC PNL TOTAL CA: CPT | Performed by: NURSE PRACTITIONER

## 2024-01-08 PROCEDURE — 2500000001 HC RX 250 WO HCPCS SELF ADMINISTERED DRUGS (ALT 637 FOR MEDICARE OP): Performed by: INTERNAL MEDICINE

## 2024-01-08 PROCEDURE — 74230 X-RAY XM SWLNG FUNCJ C+: CPT

## 2024-01-08 PROCEDURE — 92523 SPEECH SOUND LANG COMPREHEN: CPT | Mod: GN

## 2024-01-08 PROCEDURE — 99239 HOSP IP/OBS DSCHRG MGMT >30: CPT | Performed by: INTERNAL MEDICINE

## 2024-01-08 PROCEDURE — 2500000001 HC RX 250 WO HCPCS SELF ADMINISTERED DRUGS (ALT 637 FOR MEDICARE OP): Performed by: NURSE PRACTITIONER

## 2024-01-08 PROCEDURE — 97110 THERAPEUTIC EXERCISES: CPT | Mod: GP,CQ

## 2024-01-08 PROCEDURE — 2500000004 HC RX 250 GENERAL PHARMACY W/ HCPCS (ALT 636 FOR OP/ED): Performed by: INTERNAL MEDICINE

## 2024-01-08 PROCEDURE — 71045 X-RAY EXAM CHEST 1 VIEW: CPT | Mod: FOREIGN READ | Performed by: RADIOLOGY

## 2024-01-08 PROCEDURE — 2500000004 HC RX 250 GENERAL PHARMACY W/ HCPCS (ALT 636 FOR OP/ED): Performed by: NURSE PRACTITIONER

## 2024-01-08 PROCEDURE — 71045 X-RAY EXAM CHEST 1 VIEW: CPT

## 2024-01-08 PROCEDURE — 97116 GAIT TRAINING THERAPY: CPT | Mod: GP,CQ

## 2024-01-08 PROCEDURE — 74230 X-RAY XM SWLNG FUNCJ C+: CPT | Performed by: RADIOLOGY

## 2024-01-08 PROCEDURE — 92610 EVALUATE SWALLOWING FUNCTION: CPT | Mod: GN

## 2024-01-08 PROCEDURE — 1100000001 HC PRIVATE ROOM DAILY

## 2024-01-08 PROCEDURE — 92611 MOTION FLUOROSCOPY/SWALLOW: CPT | Mod: GN

## 2024-01-08 PROCEDURE — 84132 ASSAY OF SERUM POTASSIUM: CPT | Performed by: INTERNAL MEDICINE

## 2024-01-08 PROCEDURE — 85027 COMPLETE CBC AUTOMATED: CPT | Performed by: NURSE PRACTITIONER

## 2024-01-08 RX ORDER — POTASSIUM CHLORIDE 14.9 MG/ML
20 INJECTION INTRAVENOUS
Status: DISCONTINUED | OUTPATIENT
Start: 2024-01-08 | End: 2024-01-08 | Stop reason: SDUPTHER

## 2024-01-08 RX ORDER — POTASSIUM CHLORIDE 29.8 MG/ML
40 INJECTION INTRAVENOUS
Status: COMPLETED | OUTPATIENT
Start: 2024-01-08 | End: 2024-01-08

## 2024-01-08 RX ADMIN — MONTELUKAST 10 MG: 10 TABLET, FILM COATED ORAL at 21:58

## 2024-01-08 RX ADMIN — POTASSIUM CHLORIDE 40 MEQ: 29.8 INJECTION, SOLUTION INTRAVENOUS at 10:30

## 2024-01-08 RX ADMIN — POTASSIUM CHLORIDE 40 MEQ: 29.8 INJECTION, SOLUTION INTRAVENOUS at 12:15

## 2024-01-08 RX ADMIN — ATORVASTATIN CALCIUM 80 MG: 80 TABLET, FILM COATED ORAL at 21:58

## 2024-01-08 RX ADMIN — BARIUM SULFATE 20 ML: 400 SUSPENSION ORAL at 11:12

## 2024-01-08 RX ADMIN — PANTOPRAZOLE SODIUM 40 MG: 40 INJECTION, POWDER, FOR SOLUTION INTRAVENOUS at 06:09

## 2024-01-08 RX ADMIN — BARIUM SULFATE 20 ML: 400 PASTE ORAL at 11:13

## 2024-01-08 RX ADMIN — BARIUM SULFATE 50 ML: 0.81 POWDER, FOR SUSPENSION ORAL at 11:11

## 2024-01-08 RX ADMIN — BARIUM SULFATE 50 ML: 400 SUSPENSION ORAL at 11:14

## 2024-01-08 RX ADMIN — HEPARIN SODIUM 5000 UNITS: 5000 INJECTION INTRAVENOUS; SUBCUTANEOUS at 05:45

## 2024-01-08 RX ADMIN — HEPARIN SODIUM 5000 UNITS: 5000 INJECTION INTRAVENOUS; SUBCUTANEOUS at 15:04

## 2024-01-08 RX ADMIN — HEPARIN SODIUM 5000 UNITS: 5000 INJECTION INTRAVENOUS; SUBCUTANEOUS at 21:58

## 2024-01-08 ASSESSMENT — COGNITIVE AND FUNCTIONAL STATUS - GENERAL
WALKING IN HOSPITAL ROOM: A LITTLE
DAILY ACTIVITIY SCORE: 18
TOILETING: A LITTLE
CLIMB 3 TO 5 STEPS WITH RAILING: A LITTLE
STANDING UP FROM CHAIR USING ARMS: A LITTLE
MOBILITY SCORE: 17
MOVING FROM LYING ON BACK TO SITTING ON SIDE OF FLAT BED WITH BEDRAILS: A LITTLE
STANDING UP FROM CHAIR USING ARMS: A LITTLE
TURNING FROM BACK TO SIDE WHILE IN FLAT BAD: A LITTLE
DAILY ACTIVITIY SCORE: 24
PERSONAL GROOMING: A LITTLE
MOVING TO AND FROM BED TO CHAIR: A LITTLE
EATING MEALS: A LITTLE
MOBILITY SCORE: 18
DRESSING REGULAR LOWER BODY CLOTHING: A LITTLE
DRESSING REGULAR UPPER BODY CLOTHING: A LITTLE
MOBILITY SCORE: 24
MOVING FROM LYING ON BACK TO SITTING ON SIDE OF FLAT BED WITH BEDRAILS: A LITTLE
HELP NEEDED FOR BATHING: A LITTLE
CLIMB 3 TO 5 STEPS WITH RAILING: A LOT
WALKING IN HOSPITAL ROOM: A LITTLE
TURNING FROM BACK TO SIDE WHILE IN FLAT BAD: A LITTLE
MOVING TO AND FROM BED TO CHAIR: A LITTLE

## 2024-01-08 ASSESSMENT — PAIN - FUNCTIONAL ASSESSMENT
PAIN_FUNCTIONAL_ASSESSMENT: 0-10
PAIN_FUNCTIONAL_ASSESSMENT: 0-10

## 2024-01-08 ASSESSMENT — PAIN SCALES - GENERAL
PAINLEVEL_OUTOF10: 0 - NO PAIN

## 2024-01-08 NOTE — DISCHARGE SUMMARY
Discharge Diagnosis  Dysphagia secondary to recent stroke    Issues Requiring Follow-Up  pcp    Discharge Meds     Your medication list        CONTINUE taking these medications        Instructions Last Dose Given Next Dose Due   albuterol 90 mcg/actuation inhaler           amLODIPine 10 mg tablet  Commonly known as: Norvasc           atorvastatin 80 mg tablet  Commonly known as: Lipitor      Take 1 tablet (80 mg) by mouth once daily at bedtime.       Benadryl Allergy 25 mg tablet  Generic drug: diphenhydrAMINE           carvedilol 25 mg tablet  Commonly known as: Coreg           carvedilol 6.25 mg tablet  Commonly known as: Coreg           cyanocobalamin 1,000 mcg/mL injection  Commonly known as: Vitamin B-12           cyclobenzaprine 5 mg tablet  Commonly known as: Flexeril           ergocalciferol 1.25 MG (60642 UT) capsule  Commonly known as: Vitamin D-2           Fish OiL 340-1,000 mg capsule  Generic drug: omega-3 fatty acids-fish oil           fluticasone 50 mcg/actuation nasal spray  Commonly known as: Flonase           levothyroxine 25 mcg tablet  Commonly known as: Synthroid, Levoxyl           lisinopril 40 mg tablet           metoprolol succinate XL 50 mg 24 hr tablet  Commonly known as: Toprol-XL           montelukast 10 mg tablet  Commonly known as: Singulair           NIFEdipine ER 30 mg 24 hr tablet  Commonly known as: Adalat CC           omeprazole 40 mg DR capsule  Commonly known as: PriLOSEC           sodium chloride 0.65 % nasal spray  Commonly known as: Ocean           spironolacton-hydrochlorothiaz 25-25 mg tablet  Commonly known as: Aldactazide                  ASK your doctor about these medications        Instructions Last Dose Given Next Dose Due   aspirin 81 mg chewable tablet  Ask about: Should I take this medication?      Chew 1 tablet (81 mg) once daily for 1 day.                Test Results Pending At Discharge  Pending Labs       No current pending labs.            Hospital Course    Patient is a very pleasant 68-year-old female she was brought in from the nursing home due to her NG tube malplacement.  The nursing home sent her in for possible PEG placement patient was seen by speech patient can actually have a puréed diet therefore she is being discharged on a regular liquids puréed diet with no straws.  Patient is stable at the time of discharge her only deficits from her stroke as her speech    Pertinent Physical Exam At Time of Discharge  Physical Exam  HENT:      Head: Normocephalic.      Nose: Nose normal.      Mouth/Throat:      Mouth: Mucous membranes are moist.      Pharynx: Oropharynx is clear.   Cardiovascular:      Rate and Rhythm: Normal rate and regular rhythm.   Pulmonary:      Effort: Pulmonary effort is normal.   Abdominal:      General: Abdomen is flat. Bowel sounds are normal.      Palpations: Abdomen is soft.   Skin:     General: Skin is warm.      Capillary Refill: Capillary refill takes less than 2 seconds.   Neurological:      General: No focal deficit present.      Mental Status: She is alert.         Outpatient Follow-Up  No future appointments.      Charlee Wolfe MD

## 2024-01-08 NOTE — PROGRESS NOTES
Speech-Language Pathology    SLP ADULT Inpatient Speech-Language Cognition    Patient Name: Zo Nunez  MRN: [id number]  Today's Date: 1/8/2024   Time Calculation  Start Time: 0905  Stop Time: 1120  Time Calculation (min): 135 min         Current Problem:   1. Dysphagia        2. Oropharyngeal dysphagia        3. Expressive aphasia              SLP Assessment:  SLP Assessment  Prognosis: Good  Medical Staff Made Aware: Yes  Strengths: Cognition, Motivation, Family/Caregiver Suppport  Barriers: None  Pt presents with known h/o and current expressive aphasia d/t recent stroke (12/30/23).       SLP Plan:  Plan  Inpatient/Swing Bed or Outpatient: Inpatient  SLP Frequency:  (To be determined after MBS completed)  SLP Discharge Recommendations: Continue skilled SLP services at the next level of care  Discussed POC: Patient  Discussed Risks/Benefits: Yes  Patient/Caregiver Agreeable: Yes  SLP - OK to Discharge: Yes  Speech-Language Goals:  Pt to participate in further speech/language assessment to determine further areas of need. Pt to participate in speech and language exercises to improve communication for ADLs.      Subjective     Most Recent Visit:  SLP Most Recent Visit  SLP Received On: 01/08/24      General Visit Information:  General Information  Chart Reviewed: Yes  Reason for Referral: Swallow Eval  Past Medical History Relevant to Rehab: Hypothyroidism, HTN, CVA, dysphagia, chronic anemia, a-fib  Patient Seen During This Visit: Yes  Prior to Session Communication: Bedside nurse      Objective     Pain:  Pain Assessment  Pain Score: 0 - No pain      Cognition:  Cognition  Overall Cognitive Status: Within Functional Limits  Orientation Level: Oriented X4  Attention: Within Functional Limits  Memory: Within Funtional Limits       Motor Speech Production:  Motor Speech Production  Oral Motor : Impaired  Intelligibility: Impaired, Conversation  Paralinguistic Features: WFL  Respiratory Support: WFL      Auditory  Comprehension:   Auditory Comprehension  Yes/No Questions: Within Functional Limits  Commands: Within Functional Limits  Conversation: Within Functional Limits    Verbal:  Verbal Expression  Primary Mode of Expression: Verbal  Primary Language: English  Open Ended Questions: Impaired  Conversation: Impaired  Affect: Within Functional Limits  Eye Contact: Within Functional Limits  Topic Initiation: Within Functional Limits  Topic Maintenance: Within Functional Limits  Turn Taking: Within Functional Limits

## 2024-01-08 NOTE — PROGRESS NOTES
Received referral choices from family--asked Gifty MAGALLANES to send referrals in [facility].   [facility] acceptance is still pending.

## 2024-01-08 NOTE — PROGRESS NOTES
Speech-Language Pathology    Inpatient Speech-Language Pathology Clinical Swallow Evaluation    Patient Name: Zo Nunez  MRN: [id number]  Today's Date: 1/8/2024   Time Calculation  Start Time: 0905  Stop Time: 1120  Time Calculation (min): 135 min         Current Problem:   1. Dysphagia        2. Oropharyngeal dysphagia        3. Expressive aphasia              Recommendations:  Risk for Aspiration: Yes  Additional Recommendations: Modified barium swallow study, Dysphagia treatment, Speech Language Cognition Evaluation  Solid Diet Recommendations : NPO (until MBS completed)  Liquid Diet Recommendations: NPO (until MBS completed)  Medication Administration Recommendations: Non Oral  Follow up treatments: Oral motor exercises, Pharyngeal exercises, Thermal stimulation, Patient/family education  Dysphagia Goals: Patient will tolerate recommended diet without observed clinical signs of aspiration, Patient will progress to advanced diet, Patient will demonstrate appropriate strategies for swallowing safety; Pt will participate in MBSS to r/o laryngeal aspiration/determine safest PO consistency      Assessment:  Assessment Results: Pt presents with severe oral, suspected pharyngeal dysphagia, and high risk for laryngeal aspiration.  Prognosis: Good  Medical Staff Made Aware: Yes  Strengths: Cognition, Motivation, Family/Caregiver Suppport  Barriers: None      Plan:  Inpatient/Swing Bed or Outpatient: Inpatient  Treatment/Interventions: Diet recommendations, Complete MBSS, Patient/family education  SLP Plan: Skilled SLP  SLP Frequency:  (To be determined after MBS completed)  SLP Discharge Recommendations: Continue skilled SLP services at the next level of care  Discussed POC: Patient  Discussed Risks/Benefits: Yes  Patient/Caregiver Agreeable: Yes  SLP - OK to Discharge: Yes      Subjective   Current Problem:  Pt recently experienced a stroke on 12/30/23, and upon discharge from hospital went to  Acute Rehab. Pt now  admitted for dysphagia-related need for nutrition tube placement.      General Visit Information:  Patient Class: Inpatient  Living Environment:  (Arrived from  Rehab)  Ordering Physician: Yaneth  Reason for Referral: Swallow Eval  Past Medical History Relevant to Rehab: Hypothyroidism, HTN, CVA, dysphagia, chronic anemia, a-fib  Prior Level of Function:  (CVA 23; WFL prior to CVA)  Patient Seen During This Visit: Yes  Prior to Session Communication: Bedside nurse  Date of Order: 24  BaseLine Diet: Pt reports NPO, NG TF; puree trials with SLP  Current Diet : NPO      Objective     Baseline Assessment:  Respiratory Status: Room air  Behavior/Cognition: Alert, Cooperative, Pleasant mood  Vision: Functional for self-feeding  Hearing: Within Functional Limits  Patient Positioning: Upright in Bed      Pain:  Pain Score: 0 - No pain       Oral/Motor Assessment:  Oral Hygiene: WFL  Dentition: Adequate/Natural  Oral Motor: Impaired Function  Intelligibility: Intelligibility reduced  Intelligibility Ratin%-80%  Breath Support: Adequate for speech  Hearing: Within Functional Limits      Consistencies Trialed:  Consistencies Trialed: Yes  Consistencies Trialed: Pureed/extremely thick (IDDSI Level 4)  Pt fed herself 2x 1tsp (in one bolus) only    Clinical Observations:  Management of Oral Secretions: Adequate  Prolonged Oral Manipulation: Pureed/Extremity Thick (IDDSI Level 4)  Further PO trials not completed d/t significantly prolonged oral holding time prior to swallow initiation (greater than 60 seconds)

## 2024-01-08 NOTE — PROGRESS NOTES
Physical Therapy    Physical Therapy Treatment    Patient Name: Zo Nunez  MRN: [id number]  Today's Date: 1/8/2024  Time Calculation  Start Time: 1332  Stop Time: 1356  Time Calculation (min): 24 min       Assessment/Plan   PT Assessment  End of Session Communication: Bedside nurse  End of Session Patient Position: Bed, 3 rail up, Alarm off, not on at start of session  PT Plan  Inpatient/Swing Bed or Outpatient: Inpatient  PT Plan  Treatment/Interventions: Bed mobility, Transfer training, Gait training  PT Plan: Skilled PT  PT Frequency: 4 times per week  PT Discharge Recommendations: High intensity level of continued care  PT Recommended Transfer Status: Assist x1  PT - OK to Discharge: Yes (Per POC)      General Visit Information:   PT  Visit  PT Received On: 01/08/24  General  Reason for Referral: 69 yo presented from [facility] with NG tube malfunction. Recent CVA and dc'd to [facility] last week.  Referred By: Yaneth  Past Medical History Relevant to Rehab: Hypothyroidism, HTN, CVA, dysphagia, chronic anemia, a-fib  Prior to Session Communication: Bedside nurse  Patient Position Received: Bed, 3 rail up, Alarm off, not on at start of session    Subjective   Precautions:     Vital Signs:  Vital Signs  Heart Rate: 80  Heart Rate Source: Other (Comment) (pulse ox)  SpO2: 98 %    Objective   Pain:  Pain Assessment  Pain Assessment: 0-10  Pain Score: 0 - No pain  Cognition:     Postural Control:     Extremity/Trunk Assessments:    Activity Tolerance:     Treatments:  Therapeutic Exercise  Therapeutic Exercise Performed: Yes  Therapeutic Exercise Activity 1: pt performed standing x 15 reps heel raises and marches requiring Tanna for safety and fall recovery.  2 LOB noted.  Pt performed seated x 15 reps heel raises due to LOB in standing while performing.  Pt presents as increased fall risk due to LOB and Tanna to recover.    Bed Mobility  Bed Mobility: Yes  Bed Mobility 1  Bed Mobility 1: Supine to sitting  Level of Assistance 1:  Modified independent  Bed Mobility Comments 1: Min VC for hand placement and sequencing.  HOB elevated.    Ambulation/Gait Training  Ambulation/Gait Training Performed: Yes  Ambulation/Gait Training 1  Surface 1: Level tile  Device 1: No device  Gait Support Devices: Gait belt  Assistance 1: Contact guard  Comments/Distance (ft) 1: 50' x 3 (Pt demonstrates fast mckayla placing pt at increased risk and showing poor safety awareness.  Decreased heel strike on the RLE and inconsistent stride lengths noted placing pt at increased risk of falls.  CGA for safety and fall risk.)  Transfers  Transfer: Yes  Transfer 1  Technique 1: Sit to stand, Stand to sit  Transfer Device 1: Gait belt  Transfer Level of Assistance 1: Close supervision  Trials/Comments 1: Min VC for hand placement and sequencing.  Upright posture noted.    Outcome Measures:  [facility] Basic Mobility  Turning from your back to your side while in a flat bed without using bedrails: A little  Moving from lying on your back to sitting on the side of a flat bed without using bedrails: A little  Moving to and from bed to chair (including a wheelchair): A little  Standing up from a chair using your arms (e.g. wheelchair or bedside chair): A little  To walk in hospital room: A little  Climbing 3-5 steps with railing: A lot  Basic Mobility - Total Score: 17    Education Documentation  Precautions, taught by Madina Reese PTA at 1/8/2024  2:38 PM.  Learner: Patient  Readiness: Acceptance  Method: Explanation  Response: Verbalizes Understanding    Body Mechanics, taught by Madina Reese PTA at 1/8/2024  2:38 PM.  Learner: Patient  Readiness: Acceptance  Method: Explanation  Response: Verbalizes Understanding    Mobility Training, taught by Madina Reese PTA at 1/8/2024  2:38 PM.  Learner: Patient  Readiness: Acceptance  Method: Explanation  Response: Verbalizes Understanding    Education Comments  No comments found.        OP EDUCATION:  Outpatient  Education  Individual(s) Educated: Patient  Education Provided: Fall Risk  Education Comment: Educated pt on fall risk precautions and preventions.  Pt demonstrates good verbalized understanding.    Encounter Problems       Encounter Problems (Active)       Balance       Goal 1 (Progressing)       Start:  01/07/24    Expected End:  01/21/24       Pt performs all sitting and standing balance IND            Mobility       LTG - Patient will navigate a full flight of steps with bilateral HR support and CGA (Not Progressing)       Start:  01/07/24    Expected End:  01/21/24            STG - Patient will ambulate (Progressing)       Start:  01/07/24    Expected End:  01/21/24       300 ft with close supervision            PT Problem       PT Goal 1 (Progressing)       Start:  01/07/24    Expected End:  01/21/24       Pt demonstrates IND In performing 2 sets of 15 reps of prescribed BLE HEP            Safety       LTG - Patient will demonstrate safety requirements appropriate to situation/environment       Start:  01/07/24    Expected End:  01/10/24               Transfers       STG - Patient to transfer to and from sit to supine (Progressing)       Start:  01/07/24    Expected End:  01/21/24       IND         STG - Patient will transfer sit to and from stand (Progressing)       Start:  01/07/24    Expected End:  01/21/24       With distant supervision

## 2024-01-08 NOTE — PROCEDURES
"Speech-Language Pathology    SLP Inpatient MBSS/FEES Evaluation    Patient Name: Zo Nunez  MRN: [id number]  Today's Date: 2024   Time Calculation  Start Time: 905  Stop Time: 1120  Time Calculation (min): 135 min   MBS Study completed from [id number]      Current Problem:   1. Dysphagia        2. Oropharyngeal dysphagia        3. Expressive aphasia              Modified Barium Swallow Study     Patient Name: Zo Nunez  MRN: [id number]  : 1955  Today's Date: 24  Time Calculation  Start Time: 905  Stop Time: 1120  Time Calculation (min): 135 min       Recommendations:  Puree diet with regular liquids.  - Pt to STOP eating/drinking if/when moments of inability to achieve swallow promptly.   - No Straws.   -Upright posture for PO intake and remain upright for 30-60 minutes.   -Small bites/sips.   -Alternate solids/liquids.   -Slow rate of intake  -Feed only when awake/alert  - If pt demonstrates s/s aspiration or develops worsening respiratory functioning, make NPO and contact SLP      Assessment/Impression:    Full detailed SLP/Radiologist Modified Barium Swallow study report can be found under Chart Review tab, Imaging tab, and  titled \"FL Modified Barium Swallow Study\"      Pt presents with mild-moderate oropharyngeal dysphagia, increased risk of laryngeal aspiration.    Plan:  Treatment/Interventions: Pharyngeal exercises, Oral motor exercises, Patient/family education, Bolus trials  SLP Plan: Skilled SLP warranted  SLP Frequency: 3x per week  Duration: 2 weeks    Discussed POC: Patient, Nursing  Discussed Risks/Benefits: Yes  Patient/Caregiver Agreeable: Yes      GOALS:  Pt. to tolerate least restrictive diet without pulmonary compromise, Pt. to use safe swallow strategies independently in all observed trials     Education:   Pt educated on initial results of MBS study, recommended diet, and recommended safe swallow strategies       "

## 2024-01-08 NOTE — CARE PLAN
The patient's goals for the shift include comfort    The clinical goals for the shift include remain safe/free from injury

## 2024-01-09 LAB
ANION GAP SERPL CALC-SCNC: 12 MMOL/L (ref 10–20)
BUN SERPL-MCNC: 13 MG/DL (ref 6–23)
CALCIUM SERPL-MCNC: 8.7 MG/DL (ref 8.6–10.3)
CHLORIDE SERPL-SCNC: 108 MMOL/L (ref 98–107)
CO2 SERPL-SCNC: 24 MMOL/L (ref 21–32)
CREAT SERPL-MCNC: 0.84 MG/DL (ref 0.5–1.05)
EGFRCR SERPLBLD CKD-EPI 2021: 75 ML/MIN/1.73M*2
ERYTHROCYTE [DISTWIDTH] IN BLOOD BY AUTOMATED COUNT: 14.3 % (ref 11.5–14.5)
GLUCOSE SERPL-MCNC: 107 MG/DL (ref 74–99)
HCT VFR BLD AUTO: 42 % (ref 36–46)
HGB BLD-MCNC: 12.1 G/DL (ref 12–16)
MCH RBC QN AUTO: 28 PG (ref 26–34)
MCHC RBC AUTO-ENTMCNC: 28.8 G/DL (ref 32–36)
MCV RBC AUTO: 97 FL (ref 80–100)
NRBC BLD-RTO: 0 /100 WBCS (ref 0–0)
PLATELET # BLD AUTO: 161 X10*3/UL (ref 150–450)
POTASSIUM SERPL-SCNC: 3.5 MMOL/L (ref 3.5–5.3)
RBC # BLD AUTO: 4.32 X10*6/UL (ref 4–5.2)
SODIUM SERPL-SCNC: 140 MMOL/L (ref 136–145)
WBC # BLD AUTO: 3.8 X10*3/UL (ref 4.4–11.3)

## 2024-01-09 PROCEDURE — 97110 THERAPEUTIC EXERCISES: CPT | Mod: GP,CQ

## 2024-01-09 PROCEDURE — C9113 INJ PANTOPRAZOLE SODIUM, VIA: HCPCS | Performed by: NURSE PRACTITIONER

## 2024-01-09 PROCEDURE — 1100000001 HC PRIVATE ROOM DAILY

## 2024-01-09 PROCEDURE — 92526 ORAL FUNCTION THERAPY: CPT | Mod: GN

## 2024-01-09 PROCEDURE — 2500000001 HC RX 250 WO HCPCS SELF ADMINISTERED DRUGS (ALT 637 FOR MEDICARE OP): Performed by: NURSE PRACTITIONER

## 2024-01-09 PROCEDURE — 36415 COLL VENOUS BLD VENIPUNCTURE: CPT | Performed by: NURSE PRACTITIONER

## 2024-01-09 PROCEDURE — 97116 GAIT TRAINING THERAPY: CPT | Mod: GP,CQ

## 2024-01-09 PROCEDURE — 85027 COMPLETE CBC AUTOMATED: CPT | Performed by: NURSE PRACTITIONER

## 2024-01-09 PROCEDURE — 82374 ASSAY BLOOD CARBON DIOXIDE: CPT | Performed by: NURSE PRACTITIONER

## 2024-01-09 PROCEDURE — 2500000001 HC RX 250 WO HCPCS SELF ADMINISTERED DRUGS (ALT 637 FOR MEDICARE OP): Performed by: PHARMACIST

## 2024-01-09 PROCEDURE — 2500000004 HC RX 250 GENERAL PHARMACY W/ HCPCS (ALT 636 FOR OP/ED): Performed by: NURSE PRACTITIONER

## 2024-01-09 PROCEDURE — 92507 TX SP LANG VOICE COMM INDIV: CPT | Mod: GN

## 2024-01-09 PROCEDURE — 2500000004 HC RX 250 GENERAL PHARMACY W/ HCPCS (ALT 636 FOR OP/ED): Performed by: PHARMACIST

## 2024-01-09 RX ADMIN — SPIRONOLACTONE 25 MG: 25 TABLET ORAL at 08:28

## 2024-01-09 RX ADMIN — ASPIRIN 81 MG CHEWABLE TABLET 81 MG: 81 TABLET CHEWABLE at 08:27

## 2024-01-09 RX ADMIN — LISINOPRIL 40 MG: 20 TABLET ORAL at 08:27

## 2024-01-09 RX ADMIN — HEPARIN SODIUM 5000 UNITS: 5000 INJECTION INTRAVENOUS; SUBCUTANEOUS at 13:45

## 2024-01-09 RX ADMIN — HEPARIN SODIUM 5000 UNITS: 5000 INJECTION INTRAVENOUS; SUBCUTANEOUS at 20:51

## 2024-01-09 RX ADMIN — METOPROLOL SUCCINATE 50 MG: 50 TABLET, EXTENDED RELEASE ORAL at 08:27

## 2024-01-09 RX ADMIN — PANTOPRAZOLE SODIUM 40 MG: 40 INJECTION, POWDER, FOR SOLUTION INTRAVENOUS at 07:02

## 2024-01-09 RX ADMIN — MONTELUKAST 10 MG: 10 TABLET, FILM COATED ORAL at 20:51

## 2024-01-09 RX ADMIN — LEVOTHYROXINE SODIUM 25 MCG: 25 TABLET ORAL at 08:27

## 2024-01-09 RX ADMIN — ATORVASTATIN CALCIUM 80 MG: 80 TABLET, FILM COATED ORAL at 20:51

## 2024-01-09 RX ADMIN — HEPARIN SODIUM 5000 UNITS: 5000 INJECTION INTRAVENOUS; SUBCUTANEOUS at 07:02

## 2024-01-09 RX ADMIN — AMLODIPINE BESYLATE 10 MG: 10 TABLET ORAL at 08:28

## 2024-01-09 RX ADMIN — SODIUM CHLORIDE, POTASSIUM CHLORIDE, SODIUM LACTATE AND CALCIUM CHLORIDE 75 ML/HR: 600; 310; 30; 20 INJECTION, SOLUTION INTRAVENOUS at 07:04

## 2024-01-09 RX ADMIN — NIFEDIPINE 30 MG: 30 TABLET, FILM COATED, EXTENDED RELEASE ORAL at 08:27

## 2024-01-09 RX ADMIN — HYDROCHLOROTHIAZIDE 25 MG: 25 TABLET ORAL at 08:28

## 2024-01-09 ASSESSMENT — COGNITIVE AND FUNCTIONAL STATUS - GENERAL
CLIMB 3 TO 5 STEPS WITH RAILING: A LOT
STANDING UP FROM CHAIR USING ARMS: A LITTLE
MOBILITY SCORE: 17
TURNING FROM BACK TO SIDE WHILE IN FLAT BAD: A LITTLE
MOVING FROM LYING ON BACK TO SITTING ON SIDE OF FLAT BED WITH BEDRAILS: A LITTLE
WALKING IN HOSPITAL ROOM: A LITTLE
MOVING TO AND FROM BED TO CHAIR: A LITTLE

## 2024-01-09 ASSESSMENT — PAIN SCALES - GENERAL
PAINLEVEL_OUTOF10: 0 - NO PAIN

## 2024-01-09 ASSESSMENT — PAIN - FUNCTIONAL ASSESSMENT
PAIN_FUNCTIONAL_ASSESSMENT: 0-10

## 2024-01-09 NOTE — PROGRESS NOTES
Received list of SNF choices from family.  They did not all agree on the choices they gave yesterday.  [facility] is able to accept.  Patient is traditional medicare and tonight will be the 3rd midnight.  Patient will be able to leave tomorrow.  Provider updated.    Spoke to patient's daughterPetra at bedside-informed her that [facility] is able to accept and patient will be able to go tomorrow.  Petra will contact her brother, Bhavesh to update him.  We will ensure they know when transportation is scheduled for patient to go to SNF tomorrow.

## 2024-01-09 NOTE — PROGRESS NOTES
Chart reviewed.  Patient evaluated.  Patient discharged yesterday after speech eval showed that patient likely did not need PEG tube.  Awaiting placement.  Will continue to monitor, while awaiting placement.    Raffaele [facility]   1/9/2024

## 2024-01-09 NOTE — PROGRESS NOTES
Speech-Language Pathology    Inpatient  Speech-Language Pathology Treatment     Patient Name: Zo Nunez  MRN: [id number]  Today's Date: 1/9/2024  Time Calculation  Start Time: 0900  Stop Time: 0950  Time Calculation (min): 50 min       SLP Assessment:  SLP TX Intervention Outcome: Making Progress Towards Goals  Treatment Tolerance: Patient tolerated treatment well  Medical Staff Made Aware: Yes  Strengths: Cognition, Motivation, Family/Caregiver Suppport       Plan:  Inpatient/Swing Bed or Outpatient: Inpatient  SLP Frequency:  (To be determined after MBS completed)  SLP Discharge Recommendations: Continue skilled SLP services at the next level of care  Discussed POC: Patient  Discussed Risks/Benefits: Yes  Patient/Caregiver Agreeable: Yes  SLP - OK to Discharge: Yes    Dysphagia Goals: Patient will tolerate recommended diet without observed clinical signs of aspiration, Patient will progress to advanced diet, Patient will demonstrate appropriate strategies for swallowing safety;   Pt will participate in MBSS to r/o laryngeal aspiration/determine safest PO consistency (goal MET 1/8/24)    Speech-Language Goals:  Pt to participate in further speech/language assessment to determine further areas of need. Pt to participate in speech and language exercises to improve communication for ADLs.      Subjective   General Visit Information:   Patient Seen During This Visit: Yes  Prior to Session Communication: Bedside nurse  Pt seen bedside for dysphagia and speech-language therapy. Pt awake, alert, and participated readily in session. Pt reported and demo'd comprehension of intelligibility improved compensatory strategies, and dysphagia strategies as discussed.    Pt reports being lactose intolerant, except for tolerating occasional serving of cheese. Diet order updated to reflect this update this date.    Pain Assessment:   Pain Assessment: 0-10  Pain Score: 0 - No pain      Objective     Therapeutic Swallow:  Therapeutic  Swallow Intervention : Caregiver Education, Compensatory Strategies (Pt declined PO trials since already completed breakfast prior to session.)  Solid Diet Recommendations: Pureed/extremely thick  (IDDSI Level 4)  Liquid Diet Recommendations: Thin (IDDSI Level 0)  Swallow Comments: NO straws      Motor Speech:    Intelligibility noted to be negatively effected with longer utterances (greater than 4 words) and/or higher emotion when speaking. Overall, this listener understood Pt's speech with approx 70-75% intelligibility in conversation, up to 95% with 1-3 word utterances.  During conversation and speech exercises, /s/ in initial word position noted to be intermittently produced/eliminated (approx 5-8x during course of tx).    Language Expression:  Language Expression Interventions: Automatic Language Tasks, Confrontation Naming, Conversational Language Tasks  Confrontation naming with 75-80% acc independently   In conversation, occasional word-finding and/or paraphasia noted (approx 10-15% of time).    Language Comprehension:  Language Comprehension Interventions: Yes/No Questions, Single Step Commands, Understanding Conversational Language (Pt reports she is an avid reader and has goal of resuming reading. Pt open to trying audio books to begin.)  Yes/No questions with 100% acc  Single step directions with 95% acc  Conversational comprehension with approx 95% acc  Pt expressed concern that she will not be able to understand when reading books; this to be addressed further in future tx.

## 2024-01-09 NOTE — CONSULTS
Nutrition Assessment Note    Reason for Assessment  Reason for Assessment: Provider consult order    Pt admitted for:  Dysphagia [R13.10]    Chart reviewed and pt visited.  Concern for peg vs oral route for diet.  Previously had NG for enteral nutrition  History of Stef-en-Y   Per SLP did pass MBS puree/thin 1/8/24  Per pt no appetite or wt changes.    Pt agreeable to supplement while admitted.    Past Medical History:   Diagnosis Date    Acute upper respiratory infection, unspecified     Acute upper respiratory infection    Alcohol dependence, in remission (CMS/[facility]ly [facility]) 12/26/2019    History of alcoholism    Alcohol dependence, in remission (CMS/Formerly [facility]) 12/26/2019    History of alcohol dependence    Constipation, unspecified 12/26/2019    Constipation in female    Essential (primary) hypertension 12/26/2019    HTN (hypertension), benign    Other malaise 12/26/2019    Physical deconditioning    Personal history of other diseases of the circulatory system 12/26/2019    History of atrial fibrillation    Personal history of other diseases of the musculoskeletal system and connective tissue 12/26/2019    History of rhabdomyolysis    Personal history of other specified conditions 12/26/2019    History of weakness    Pressure ulcer of sacral region, stage 2 (CMS/Formerly [facility]) 12/26/2019    Pressure injury of sacral region, stage 2    Trichomonal vulvovaginitis     Trichomonal vulvovaginitis     Results for orders placed or performed during the hospital encounter of 01/06/24 (from the past 24 hour(s))   Potassium   Result Value Ref Range    Potassium 3.3 (L) 3.5 - 5.3 mmol/L   CBC   Result Value Ref Range    WBC 3.8 (L) 4.4 - 11.3 x10*3/uL    nRBC 0.0 0.0 - 0.0 /100 WBCs    RBC 4.32 4.00 - 5.20 x10*6/uL    Hemoglobin 12.1 12.0 - 16.0 g/dL    Hematocrit 42.0 36.0 - 46.0 %    MCV 97 80 - 100 fL    MCH 28.0 26.0 - 34.0 pg    MCHC 28.8 (L) 32.0 - 36.0 g/dL    RDW 14.3 11.5 - 14.5 %    Platelets 161 150 - 450 x10*3/uL   Basic metabolic panel  "  Result Value Ref Range    Glucose 107 (H) 74 - 99 mg/dL    Sodium 140 136 - 145 mmol/L    Potassium 3.5 3.5 - 5.3 mmol/L    Chloride 108 (H) 98 - 107 mmol/L    Bicarbonate 24 21 - 32 mmol/L    Anion Gap 12 10 - 20 mmol/L    Urea Nitrogen 13 6 - 23 mg/dL    Creatinine 0.84 0.50 - 1.05 mg/dL    eGFR 75 >60 mL/min/1.73m*2    Calcium 8.7 8.6 - 10.3 mg/dL     Scheduled medications  amLODIPine, 10 mg, oral, Daily  aspirin, 81 mg, oral, Daily  atorvastatin, 80 mg, oral, Nightly  cyanocobalamin, 1,000 mcg, intramuscular, q30 days  heparin (porcine), 5,000 Units, subcutaneous, q8h  spironolactone, 25 mg, oral, Daily   And  hydroCHLOROthiazide, 25 mg, oral, Daily  levothyroxine, 25 mcg, oral, Daily  lisinopril, 40 mg, oral, Daily  metoprolol succinate XL, 50 mg, oral, Daily  montelukast, 10 mg, oral, Nightly  NIFEdipine ER, 30 mg, oral, Daily  pantoprazole, 40 mg, oral, Daily before breakfast   Or  pantoprazole, 40 mg, intravenous, Daily before breakfast  polyethylene glycol, 17 g, oral, Daily      Continuous medications  lactated Ringer's, 75 mL/hr, Last Rate: 75 mL/hr (01/09/24 0704)      PRN medications  PRN medications: acetaminophen **OR** acetaminophen **OR** acetaminophen, albuterol, fluticasone, hydrALAZINE, ondansetron ODT **OR** ondansetron, sodium chloride    Dietary Orders (From admission, onward)       Start     Ordered    01/09/24 0828  Oral nutritional supplements  Until discontinued        Question Answer Comment   Deliver with All meals    Select supplement: Ensure Plus High Protein        01/09/24 0827    01/08/24 1139  Adult diet Cardiac; 70 gm fat; 2 - 3 grams Sodium; Pureed 4  Diet effective now        Comments: No straws   Question Answer Comment   Diet type Cardiac    Fat restriction: 70 gm fat    Sodium restriction: 2 - 3 grams Sodium    Texture Pureed 4        01/08/24 1140                  Anthropometrics:  Height: 165 cm (5' 4.96\")  Weight: 76.4 kg (168 lb 6.9 oz)  BMI (Calculated): 28.06    Wt " Readings from Last 2 Encounters:   01/06/24 76.4 kg (168 lb 6.9 oz)   12/30/23 76.2 kg (167 lb 15.9 oz)     Weight Change  Significant Weight Loss: No    Estimated Energy Needs  Total Energy Estimated Needs (kCal): 1910 kCal  Total Estimated Energy Need per Day (kCal/kg): 2290 kCal/kg  Method for Estimating Needs: 25-30    Estimated Protein Needs  Total Protein Estimated Needs (g): 60 g  Total Protein Estimated Needs (g/kg): 75 g/kg  Method for Estimating Needs: 0.8-1.0    Estimated Fluid Needs  Method for Estimating Needs: 1ml/kcal or per MD    Nutrition Focused Physical Findings:  Subcutaneous Fat Loss  Orbital Fat Pads: Mild-Moderate (slight dark circles and slight hollowing)  Buccal Fat Pads: Mild-Moderate (flat cheeks, minimal bounce)    Muscle Wasting  Temporalis: Well nourished (well-defined muscle)    Edema  Edema: none    Physical Findings (Nutrition Deficiency/Toxicity)  Skin: Negative     Nutrition Diagnosis   Malnutrition Diagnosis  Patient has Malnutrition Diagnosis: No    Patient has Nutrition Diagnosis: Yes  Nutrition Diagnosis 1: Predicted inadequate energy intake  Diagnosis Status (1): New  Related to (1): chronic illness  As Evidenced by (1): change of nutrition route     Nutrition Interventions/Recommendations   Food and/or Nutrient Delivery Interventions  Interventions: Meals and snacks, Medical food supplement    Meals and Snacks: Texture-modified diet, Mineral-modified diet    Medical Food Supplement: Commercial beverage  Goal: Ensure TID for encouraged intake    Additional Interventions: Should pt intake become sub optimal, consider alternate route for nutrition in order to meet needs vs goals of care discussion    Coordination of Nutrition Care by a Nutrition Professional  Collaboration and Referral of Nutrition Care: Collaboration by nutrition professional with other providers    Education Documentation  No documentation found.      Nutrition Monitoring and Evaluation   Food and Nutrient  Related History  Energy Intake: Estimated energy intake    Fluid Intake: Estimated fluid intake    Mealtime Behavior: Limited number of accepted foods    Anthropometrics: Body Composition/Growth/Weight History  Weight Change: Weight gain, Weight loss    Biochemical Data, Medical Tests and Procedures  Electrolyte and Renal Panel: Other (Comment)  Criteria: as clinically indicated    Gastrointestinal Profile: Other (Comment)  Criteria: as clinically indicated    Glucose/Endocrine Profile: Other (Comment)  Criteria: as clinically indicated    Nutritional Anemia Profile: Other (Comment)  Criteria: as clinically indicated    Vitamin Profile: Other (Comment)  Criteria: as clinically indicated    Nutrition Focused Physical Findings  Adipose: Loss of subcutaneous fat    Stool output  Urine volume  Overall appearance    Follow Up  Time Spent (min): 60 minutes  Last Date of Nutrition Visit: 01/08/24  Nutrition Follow-Up Needed?: Dietitian to reassess per policy  Follow up Comment: GS oral vs peg

## 2024-01-09 NOTE — PROGRESS NOTES
Physical Therapy    Physical Therapy Treatment    Patient Name: Zo Nunez  MRN: [id number]  Today's Date: 1/9/2024  Time Calculation  Start Time: 1309  Stop Time: 1344  Time Calculation (min): 35 min       Assessment/Plan   PT Assessment  End of Session Communication: Bedside nurse  End of Session Patient Position: Bed, 3 rail up, Alarm off, not on at start of session (RN present)  PT Plan  Inpatient/Swing Bed or Outpatient: Inpatient  PT Plan  Treatment/Interventions: Bed mobility, Transfer training, Gait training  PT Plan: Skilled PT  PT Frequency: 4 times per week  PT Discharge Recommendations: High intensity level of continued care  PT Recommended Transfer Status: Assist x1  PT - OK to Discharge: Yes (per POC)      General Visit Information:   PT  Visit  PT Received On: 01/09/24  General  Reason for Referral: 69 yo presented from [facility] with NG tube malfunction. Recent CVA and dc'd to [facility] last week.  Referred By: Yaneth  Past Medical History Relevant to Rehab: Hypothyroidism, HTN, CVA, dysphagia, chronic anemia, a-fib  Family/Caregiver Present: Yes (daughter present)  Prior to Session Communication: Bedside nurse  Patient Position Received: Bed, 3 rail up, Alarm off, not on at start of session  General Comment: Pt demonstrating difficulty following commands this date.  Instructed pt in HEP and supine therex due to difficulty with cues.    Subjective   Precautions:     Vital Signs:  Vital Signs  Heart Rate: 62  Heart Rate Source: Other (Comment) (pulse ox)  SpO2: 99 %  Patient Position: Sitting    Objective   Pain:  Pain Assessment  Pain Assessment: 0-10  Pain Score: 0 - No pain  Cognition:     Postural Control:     Extremity/Trunk Assessments:    Activity Tolerance:     Treatments:  Therapeutic Exercise  Therapeutic Exercise Performed: Yes  Therapeutic Exercise Activity 1: Pt demonstrating difficulty following commands this date.  Performed x 20 reps AP, GS, QS, hip ABD, and SLR.  Pt required mod encouragement to  stay on task this date.    Bed Mobility  Bed Mobility: Yes  Bed Mobility 1  Bed Mobility 1: Supine to sitting, Sitting to supine  Level of Assistance 1: Modified independent  Bed Mobility Comments 1: HOB elevated.  Use of HR from supine>sit.  Min VC for sequencing.    Ambulation/Gait Training  Ambulation/Gait Training Performed: Yes  Ambulation/Gait Training 1  Surface 1: Level tile  Device 1: No device  Gait Support Devices: Gait belt  Assistance 1: Contact guard  Comments/Distance (ft) 1: 100 x 2 (Pt displays fast mckayla and poor safety awareness with line management.  Pt displays poor proprioception with ambulation therefore CGA for safety and fall prevention.)  Transfers  Transfer: Yes  Transfer 1  Technique 1: Sit to stand, Stand to sit  Transfer Device 1: Gait belt  Transfer Level of Assistance 1: Close supervision  Trials/Comments 1: Min VC for sequencing and hand positioning.  Poor safety awareness with line management.    Outcome Measures:  [facility] Basic Mobility  Turning from your back to your side while in a flat bed without using bedrails: A little  Moving from lying on your back to sitting on the side of a flat bed without using bedrails: A little  Moving to and from bed to chair (including a wheelchair): A little  Standing up from a chair using your arms (e.g. wheelchair or bedside chair): A little  To walk in hospital room: A little  Climbing 3-5 steps with railing: A lot  Basic Mobility - Total Score: 17    Education Documentation  Body Mechanics, taught by Madina Reese PTA at 1/9/2024  1:56 PM.  Learner: Family, Patient  Readiness: Acceptance  Method: Explanation  Response: Verbalizes Understanding, Needs Reinforcement    Home Exercise Program, taught by Madina Reese PTA at 1/9/2024  1:56 PM.  Learner: Family, Patient  Readiness: Acceptance  Method: Explanation  Response: Verbalizes Understanding, Needs Reinforcement    Mobility Training, taught by Madina Reese PTA at 1/9/2024  1:56 PM.  Learner:  Family, Patient  Readiness: Acceptance  Method: Explanation  Response: Verbalizes Understanding, Needs Reinforcement    Education Comments  No comments found.        OP EDUCATION:  Outpatient Education  Individual(s) Educated: Patient  Education Provided: Home Exercise Program, Fall Risk  Education Comment: Education of supine exercises to be performed throughout the day. Education of fall risk and decreaseed proprioception given.    Encounter Problems       Encounter Problems (Active)       Balance       Goal 1 (Progressing)       Start:  01/07/24    Expected End:  01/21/24       Pt performs all sitting and standing balance IND            Mobility       LTG - Patient will navigate a full flight of steps with bilateral HR support and CGA (Not Progressing)       Start:  01/07/24    Expected End:  01/21/24            STG - Patient will ambulate (Progressing)       Start:  01/07/24    Expected End:  01/21/24       300 ft with close supervision            PT Problem       PT Goal 1 (Progressing)       Start:  01/07/24    Expected End:  01/21/24       Pt demonstrates IND In performing 2 sets of 15 reps of prescribed BLE HEP            Safety       LTG - Patient will demonstrate safety requirements appropriate to situation/environment       Start:  01/07/24    Expected End:  01/10/24               Transfers       STG - Patient to transfer to and from sit to supine (Progressing)       Start:  01/07/24    Expected End:  01/21/24       IND         STG - Patient will transfer sit to and from stand (Progressing)       Start:  01/07/24    Expected End:  01/21/24       With distant supervision

## 2024-01-09 NOTE — CARE PLAN
The patient's goals for the shift include going to a new rehab facility    The clinical goals for the shift include free from falls      Problem: Swallowing  Goal: LTG - Patient will demonstrate safe swallowing Intervention/techniques  Outcome: Progressing     Problem: Fall/Injury  Goal: Be free from injury by end of the shift  Outcome: Progressing

## 2024-01-10 ENCOUNTER — NURSING HOME VISIT (OUTPATIENT)
Dept: POST ACUTE CARE | Facility: EXTERNAL LOCATION | Age: 69
End: 2024-01-10
Payer: COMMERCIAL

## 2024-01-10 VITALS
OXYGEN SATURATION: 98 % | HEIGHT: 65 IN | SYSTOLIC BLOOD PRESSURE: 150 MMHG | BODY MASS INDEX: 28.06 KG/M2 | WEIGHT: 168.43 LBS | RESPIRATION RATE: 18 BRPM | TEMPERATURE: 97.4 F | HEART RATE: 101 BPM | DIASTOLIC BLOOD PRESSURE: 99 MMHG

## 2024-01-10 VITALS
HEIGHT: 64 IN | HEART RATE: 85 BPM | TEMPERATURE: 97.1 F | OXYGEN SATURATION: 96 % | WEIGHT: 147 LBS | BODY MASS INDEX: 25.1 KG/M2 | SYSTOLIC BLOOD PRESSURE: 155 MMHG | DIASTOLIC BLOOD PRESSURE: 90 MMHG | RESPIRATION RATE: 18 BRPM

## 2024-01-10 DIAGNOSIS — I48.11 LONGSTANDING PERSISTENT ATRIAL FIBRILLATION (MULTI): ICD-10-CM

## 2024-01-10 DIAGNOSIS — R13.10 DYSPHAGIA, UNSPECIFIED TYPE: Primary | ICD-10-CM

## 2024-01-10 DIAGNOSIS — E03.9 HYPOTHYROIDISM, UNSPECIFIED TYPE: ICD-10-CM

## 2024-01-10 DIAGNOSIS — Z79.01 CURRENT USE OF ANTICOAGULANT THERAPY: ICD-10-CM

## 2024-01-10 DIAGNOSIS — I10 ESSENTIAL HYPERTENSION: ICD-10-CM

## 2024-01-10 LAB
ATRIAL RATE: 90 BPM
P AXIS: 64 DEGREES
P OFFSET: 199 MS
P ONSET: 131 MS
PR INTERVAL: 182 MS
Q ONSET: 214 MS
Q ONSET: 222 MS
QRS COUNT: 15 BEATS
QRS COUNT: 20 BEATS
QRS DURATION: 72 MS
QRS DURATION: 84 MS
QT INTERVAL: 328 MS
QT INTERVAL: 370 MS
QTC CALCULATION(BAZETT): 452 MS
QTC CALCULATION(BAZETT): 465 MS
QTC FREDERICIA: 414 MS
QTC FREDERICIA: 423 MS
R AXIS: 17 DEGREES
R AXIS: 42 DEGREES
T AXIS: -42 DEGREES
T AXIS: 5 DEGREES
T OFFSET: 378 MS
T OFFSET: 407 MS
VENTRICULAR RATE: 121 BPM
VENTRICULAR RATE: 90 BPM

## 2024-01-10 PROCEDURE — 2500000004 HC RX 250 GENERAL PHARMACY W/ HCPCS (ALT 636 FOR OP/ED): Performed by: INTERNAL MEDICINE

## 2024-01-10 PROCEDURE — 2500000001 HC RX 250 WO HCPCS SELF ADMINISTERED DRUGS (ALT 637 FOR MEDICARE OP): Performed by: NURSE PRACTITIONER

## 2024-01-10 PROCEDURE — 2500000004 HC RX 250 GENERAL PHARMACY W/ HCPCS (ALT 636 FOR OP/ED): Performed by: NURSE PRACTITIONER

## 2024-01-10 PROCEDURE — C9113 INJ PANTOPRAZOLE SODIUM, VIA: HCPCS | Performed by: NURSE PRACTITIONER

## 2024-01-10 PROCEDURE — 99310 SBSQ NF CARE HIGH MDM 45: CPT | Performed by: PHYSICIAN ASSISTANT

## 2024-01-10 PROCEDURE — 2500000004 HC RX 250 GENERAL PHARMACY W/ HCPCS (ALT 636 FOR OP/ED): Performed by: PHARMACIST

## 2024-01-10 PROCEDURE — 2500000001 HC RX 250 WO HCPCS SELF ADMINISTERED DRUGS (ALT 637 FOR MEDICARE OP): Performed by: PHARMACIST

## 2024-01-10 RX ORDER — HYDRALAZINE HYDROCHLORIDE 10 MG/1
10 TABLET, FILM COATED ORAL 3 TIMES DAILY
Status: DISCONTINUED | OUTPATIENT
Start: 2024-01-10 | End: 2024-01-10 | Stop reason: HOSPADM

## 2024-01-10 RX ORDER — NAPROXEN SODIUM 220 MG/1
81 TABLET, FILM COATED ORAL DAILY
Qty: 2 TABLET | Refills: 0 | Status: SHIPPED | OUTPATIENT
Start: 2024-01-10 | End: 2024-01-10 | Stop reason: HOSPADM

## 2024-01-10 RX ORDER — HYDRALAZINE HYDROCHLORIDE 10 MG/1
10 TABLET, FILM COATED ORAL 3 TIMES DAILY PRN
Status: DISCONTINUED | OUTPATIENT
Start: 2024-01-10 | End: 2024-02-27

## 2024-01-10 RX ADMIN — LEVOTHYROXINE SODIUM 25 MCG: 25 TABLET ORAL at 09:29

## 2024-01-10 RX ADMIN — AMLODIPINE BESYLATE 10 MG: 10 TABLET ORAL at 07:16

## 2024-01-10 RX ADMIN — LISINOPRIL 40 MG: 20 TABLET ORAL at 07:20

## 2024-01-10 RX ADMIN — METOPROLOL SUCCINATE 50 MG: 50 TABLET, EXTENDED RELEASE ORAL at 07:20

## 2024-01-10 RX ADMIN — PANTOPRAZOLE SODIUM 40 MG: 40 INJECTION, POWDER, FOR SOLUTION INTRAVENOUS at 06:31

## 2024-01-10 RX ADMIN — HEPARIN SODIUM 5000 UNITS: 5000 INJECTION INTRAVENOUS; SUBCUTANEOUS at 05:10

## 2024-01-10 RX ADMIN — ASPIRIN 81 MG CHEWABLE TABLET 81 MG: 81 TABLET CHEWABLE at 09:29

## 2024-01-10 RX ADMIN — NIFEDIPINE 30 MG: 30 TABLET, FILM COATED, EXTENDED RELEASE ORAL at 07:20

## 2024-01-10 RX ADMIN — HYDRALAZINE HYDROCHLORIDE 10 MG: 20 INJECTION INTRAMUSCULAR; INTRAVENOUS at 05:10

## 2024-01-10 RX ADMIN — HYDROCHLOROTHIAZIDE 25 MG: 25 TABLET ORAL at 07:20

## 2024-01-10 RX ADMIN — SPIRONOLACTONE 25 MG: 25 TABLET ORAL at 07:20

## 2024-01-10 ASSESSMENT — COGNITIVE AND FUNCTIONAL STATUS - GENERAL
DAILY ACTIVITIY SCORE: 24
MOBILITY SCORE: 24

## 2024-01-10 NOTE — LETTER
"Patient: Zo Nunez  : 1955    Encounter Date: 01/10/2024    1/10/2024  Name: Zo Nunez  YOB: 1955    Chief complaint: Recent CVA with dysarthria and dysphagia.    HPI: This is a 68 year old  female who has a medical history remarkable for acute ischemic stroke multifocal embolic, secondary to atrial fibrillation. She was not on anticoagulation but as of 2024 she has been taking Eliquis 5 mg po bid. Patient has R sided weakness and significant dysarthria and has been receiving care at rehab facility. Initially there was concern for aspiration and patient had NG tube for feeding, medications, and hydration. She was re evaluated by speech therapy and found to be able to tolerated pureed diet with regular liquids during last ER visit. Patient requested discharge to alternative rehab facility and she was discharged to [facility].    Cerebrovascular Accident  The current episode started more than 1 month ago. The problem has been gradually improving. Pertinent negatives include no arthralgias, change in bowel habit, chest pain, chills, coughing, fever, headaches, nausea, numbness or urinary symptoms. The symptoms are aggravated by eating. She has tried rest and walking for the symptoms. The treatment provided moderate relief.     Review of systems:   ROS negative except were noted in HPI.    Code Status: full code    /90   Pulse 85   Temp 36.2 °C (97.1 °F)   Resp 18   Ht 1.626 m (5' 4\")   Wt 66.7 kg (147 lb)   SpO2 96%   BMI 25.23 kg/m²      Physical Exam  Constitutional:       General: She is not in acute distress.     Appearance: Normal appearance.   HENT:      Head: Normocephalic.      Nose: Nose normal. No congestion.      Mouth/Throat:      Mouth: Mucous membranes are moist.   Eyes:      Extraocular Movements: Extraocular movements intact.      Pupils: Pupils are equal, round, and reactive to light.   Cardiovascular:      Rate and Rhythm: Normal rate " and regular rhythm.      Pulses: Normal pulses.      Heart sounds: Normal heart sounds.   Pulmonary:      Effort: Pulmonary effort is normal.      Breath sounds: Normal breath sounds. No wheezing or rales.   Abdominal:      General: Bowel sounds are normal. There is no distension.      Palpations: Abdomen is soft.      Tenderness: There is no abdominal tenderness. There is no guarding.   Genitourinary:     Comments: Voiding.  Musculoskeletal:      Cervical back: Normal range of motion.      Comments: Mild R sided weakness.   Lymphadenopathy:      Cervical: No cervical adenopathy.   Skin:     General: Skin is warm and dry.      Capillary Refill: Capillary refill takes less than 2 seconds.   Neurological:      Mental Status: She is alert.      Comments: Dysarthric speech.   Psychiatric:         Mood and Affect: Mood normal.        Medications reviewed during visit at facility.  Aldactazide 20-25 po daily  Apixaban 5 mg po bid  Montelukast 10 mg po daily  Prilosec 40 mg po daily  Metoprolol succinate 50 mg po daily  Lisinopril 40 mg po daily  Levothyroxine 25 mcg po daily  Fluticasone 50 mcg one spray each nostril daily  Fish oil 1000 mg po daily  Vitamin D 2 1.25 mg q am on day  Cyclobenzaprine 5 mg q 12 hours  Vitamin B 12 1000 mcg IM q month  Coreg 25 mg po bid  Coreg 6.25 mg po bid  Atorvastatin 5 mg po daily  Amlodipine 10 mg po daily  Albuterol/ Budesonide 90/80 one puff q 6 hours prn  Labs reviewed at facility:   Contains abnormal data CBC  Order: [id number]  Status: Final result       Visible to patient: No (inaccessible in [facility])    0 Result Notes            Component  Ref Range & Units 1 d ago 2 d ago 4 d ago 6 d ago 9 d ago 11 d ago 8 mo ago   WBC  4.4 - 11.3 x10*3/uL 3.8 Low  4.4 9.7 5.8 7.0 9.2 5.0 R   nRBC  0.0 - 0.0 /100 WBCs 0.0 0.0 0.0 0.0 0.0 0.0 0.0 R   RBC  4.00 - 5.20 x10*6/uL 4.32 4.31 4.55 4.25 4.77 3.82 Low  5.02 R   Hemoglobin  12.0 - 16.0 g/dL 12.1 12.4 13.2 12.2 13.8 11.1 Low  13.8    Hematocrit  36.0 - 46.0 % 42.0 37.2 41.2 38.8 43.7 34.1 Low  44.2   MCV  80 - 100 fL 97 86 91 91 92 89 88   MCH  26.0 - 34.0 pg 28.0 28.8 29.0 28.7 28.9 29.1    MCHC  32.0 - 36.0 g/dL 28.8 Low  33.3 32.0 31.4 Low  31.6 Low  32.6 31.2 Low    RDW  11.5 - 14.5 % 14.3 13.4 14.2 14.9 High  16.0 High  16.2 High  14.8 High    Platelets  150 - 450 x10*3/uL 161 243 229 198 188 178 208 R   Resulting Agency [facility]              Specimen Collected: 01/09/24 04:03 Last Resulted: 01/09/24 05:29          Contains abnormal data Basic metabolic panel  Order: [id number]  Status: Final result       Visible to patient: No (inaccessible in [facility])    0 Result Notes   important suggestion  Newer results are available. Click to view them now.               Component  Ref Range & Units 3 d ago 4 d ago 6 d ago 8 d ago 9 d ago 11 d ago 8 mo ago   Glucose  74 - 99 mg/dL 80 85 94 119 High  119 High  121 High  85   Sodium  136 - 145 mmol/L 136 140 140 139 140 138 141   Potassium  3.5 - 5.3 mmol/L 3.6 3.3 Low  3.5 3.5 3.4 Low  CM 3.3 Low  4.2 CM   Comment: MILD HEMOLYSIS DETECTED. The result may be falsely elevated due to hemolysis or other interferents. Clinical correlation is recommended. Repeat testing may be considered.   Chloride  98 - 107 mmol/L 108 High  107 107 109 High  106 105 108 High    Bicarbonate  21 - 32 mmol/L 16 Low  19 Low  22 20 Low  23 26 24   Anion Gap  10 - 20 mmol/L 16 17 15 14 14 10 13   Urea Nitrogen  6 - 23 mg/dL 14 16 16 13 17 26 High  17   Creatinine  0.50 - 1.05 mg/dL 0.80 0.84 0.88 0.79 0.84 0.83 0.72   eGFR  >60 mL/min/1.73m*2 80 75 CM 71 CM 81 CM 75 CM 76 CM    Comment: Calculations of estimated GFR are performed using the 2021 CKD-EPI Study Refit equation without the race variable for the IDMS-Traceable creatinine methods.  https://jasn.asnjournals.org/content/early/2021/09/22/ASN.1284070894   Calcium  8.6 - 10.3 mg/dL 8.4 Low  8.9 8.5 Low  8.7 8.9 8.5 Low  9.5    Resulting Agency [facility]              Specimen Collected: 01/07/24 04:22 Last Resulted: 01/07/24 04:57           Assessment/Plan   Problem List Items Addressed This Visit       Essential hypertension     Review BP readings. Continue with Metoprolol ,Lisinopril, and Amlodipine         Hypothyroidism     Levothyroxine 25 mcg po daily. TSH 1.53 on 3/23/23.         Longstanding persistent atrial fibrillation (CMS/HCC)     Controlled rate on beta blockers. Now on Eliquis.         Dysphagia - Primary     Speech therapy following. Now on Regular diet, Pureed texture, Nectar consistency.         Current use of anticoagulant therapy     Apixaban 5 mg po bid            Time:  I spent 45 minutes or greater with the patient. Greater than 50% of this time was spent in counseling and or coordination of care. The time includes prep time of reviewing vital signs, report from direct nursing staff and or therapists, hospital documentation, reviewing labs, radiographs, diagnostic tests and or consultations, time directly spent with the patient interviewing, examining, and education regarding diagnosis, treatments, and medications, as well as documentation in the electronic medical record, and reviewing the plan of care and any new orders with the patient, nursing staff and other staff directly related to the patients care.      Harshad Smalls PA-C       Electronically Signed By: Harshad Smalls PA-C   1/11/24 11:33 AM

## 2024-01-10 NOTE — PROGRESS NOTES
Physical Therapy                 Therapy Communication Note    Patient Name: Zo Nunez  MRN: [id number]  Today's Date: 1/10/2024     Discipline: Physical Therapy    Missed Visit Reason: Missed Visit Reason: Other (Comment) (Spoke to RN, pt being discharged to facility and transport set for 12pm.)    Missed Time: Attempt

## 2024-01-10 NOTE — SIGNIFICANT EVENT
" 68-year-old female she was brought in from the nursing home due to her NG tube malplacement.  The nursing home sent her in for possible PEG placement patient was seen by speech patient can actually have a puréed diet therefore she is being discharged on a regular liquids puréed diet with no straws.  Patient is stable at the time of discharge her only deficits from her stroke as her speech.  Pt was discharged on her home meds, As per neuro recs Asprin stopped and started on Apixiban 5mg BID given hx of CVA and Afib. Case discussed with pharmacy team.      Blood pressure (!) 150/99, pulse 101, temperature 36.3 °C (97.4 °F), temperature source Temporal, resp. rate 18, height 1.65 m (5' 4.96\"), weight 76.4 kg (168 lb 6.9 oz), SpO2 98 %.     Physical Exam  HENT:      Head: Normocephalic.      Nose: Nose normal.      Mouth/Throat:      Mouth: Mucous membranes are moist.      Pharynx: Oropharynx is clear.   Cardiovascular:      Rate and Rhythm: Normal rate and regular rhythm.   Pulmonary:      Effort: Pulmonary effort is normal.   Abdominal:      General: Abdomen is flat. Bowel sounds are normal.      Palpations: Abdomen is soft.   Skin:     General: Skin is warm.      Capillary Refill: Capillary refill takes less than 2 seconds.   Neurological:      General: No focal deficit present.      Mental Status: She is alert.   "

## 2024-01-10 NOTE — PROGRESS NOTES
"1/10/2024  Name: Zo Nunez  YOB: 1955    Chief complaint: Recent CVA with dysarthria and dysphagia.    HPI: This is a 68 year old  female who has a medical history remarkable for acute ischemic stroke multifocal embolic, secondary to atrial fibrillation. She was not on anticoagulation but as of 1/6/2024 she has been taking Eliquis 5 mg po bid. Patient has R sided weakness and significant dysarthria and has been receiving care at rehab facility. Initially there was concern for aspiration and patient had NG tube for feeding, medications, and hydration. She was re evaluated by speech therapy and found to be able to tolerated pureed diet with regular liquids during last ER visit. Patient requested discharge to alternative rehab facility and she was discharged to [facility].    Cerebrovascular Accident  The current episode started more than 1 month ago. The problem has been gradually improving. Pertinent negatives include no arthralgias, change in bowel habit, chest pain, chills, coughing, fever, headaches, nausea, numbness or urinary symptoms. The symptoms are aggravated by eating. She has tried rest and walking for the symptoms. The treatment provided moderate relief.     Review of systems:   ROS negative except were noted in HPI.    Code Status: full code    /90   Pulse 85   Temp 36.2 °C (97.1 °F)   Resp 18   Ht 1.626 m (5' 4\")   Wt 66.7 kg (147 lb)   SpO2 96%   BMI 25.23 kg/m²      Physical Exam  Constitutional:       General: She is not in acute distress.     Appearance: Normal appearance.   HENT:      Head: Normocephalic.      Nose: Nose normal. No congestion.      Mouth/Throat:      Mouth: Mucous membranes are moist.   Eyes:      Extraocular Movements: Extraocular movements intact.      Pupils: Pupils are equal, round, and reactive to light.   Cardiovascular:      Rate and Rhythm: Normal rate and regular rhythm.      Pulses: Normal pulses.      Heart sounds: " Normal heart sounds.   Pulmonary:      Effort: Pulmonary effort is normal.      Breath sounds: Normal breath sounds. No wheezing or rales.   Abdominal:      General: Bowel sounds are normal. There is no distension.      Palpations: Abdomen is soft.      Tenderness: There is no abdominal tenderness. There is no guarding.   Genitourinary:     Comments: Voiding.  Musculoskeletal:      Cervical back: Normal range of motion.      Comments: Mild R sided weakness.   Lymphadenopathy:      Cervical: No cervical adenopathy.   Skin:     General: Skin is warm and dry.      Capillary Refill: Capillary refill takes less than 2 seconds.   Neurological:      Mental Status: She is alert.      Comments: Dysarthric speech.   Psychiatric:         Mood and Affect: Mood normal.        Medications reviewed during visit at facility.  Aldactazide 20-25 po daily  Apixaban 5 mg po bid  Montelukast 10 mg po daily  Prilosec 40 mg po daily  Metoprolol succinate 50 mg po daily  Lisinopril 40 mg po daily  Levothyroxine 25 mcg po daily  Fluticasone 50 mcg one spray each nostril daily  Fish oil 1000 mg po daily  Vitamin D 2 1.25 mg q am on day  Cyclobenzaprine 5 mg q 12 hours  Vitamin B 12 1000 mcg IM q month  Coreg 25 mg po bid  Coreg 6.25 mg po bid  Atorvastatin 5 mg po daily  Amlodipine 10 mg po daily  Albuterol/ Budesonide 90/80 one puff q 6 hours prn  Labs reviewed at facility:   Contains abnormal data CBC  Order: [id number]  Status: Final result       Visible to patient: No (inaccessible in [facility])    0 Result Notes            Component  Ref Range & Units 1 d ago 2 d ago 4 d ago 6 d ago 9 d ago 11 d ago 8 mo ago   WBC  4.4 - 11.3 x10*3/uL 3.8 Low  4.4 9.7 5.8 7.0 9.2 5.0 R   nRBC  0.0 - 0.0 /100 WBCs 0.0 0.0 0.0 0.0 0.0 0.0 0.0 R   RBC  4.00 - 5.20 x10*6/uL 4.32 4.31 4.55 4.25 4.77 3.82 Low  5.02 R   Hemoglobin  12.0 - 16.0 g/dL 12.1 12.4 13.2 12.2 13.8 11.1 Low  13.8   Hematocrit  36.0 - 46.0 % 42.0 37.2 41.2 38.8 43.7 34.1 Low  44.2    MCV  80 - 100 fL 97 86 91 91 92 89 88   MCH  26.0 - 34.0 pg 28.0 28.8 29.0 28.7 28.9 29.1    MCHC  32.0 - 36.0 g/dL 28.8 Low  33.3 32.0 31.4 Low  31.6 Low  32.6 31.2 Low    RDW  11.5 - 14.5 % 14.3 13.4 14.2 14.9 High  16.0 High  16.2 High  14.8 High    Platelets  150 - 450 x10*3/uL 161 243 229 198 188 178 208 R   Resulting Agency [facility]              Specimen Collected: 01/09/24 04:03 Last Resulted: 01/09/24 05:29          Contains abnormal data Basic metabolic panel  Order: [id number]  Status: Final result       Visible [facility] patient: No [facility]    0 Result Notes   important suggestion  Newer results are available. Click to view them now.               Component  Ref Range & Units 3 d ago 4 d ago 6 d ago 8 d ago 9 d ago 11 d ago 8 mo ago   Glucose  74 - 99 mg/dL 80 85 94 119 High  119 High  121 High  85   Sodium  136 - 145 mmol/L 136 140 140 139 140 138 141   Potassium  3.5 - 5.3 mmol/L 3.6 3.3 Low  3.5 3.5 3.4 Low  CM 3.3 Low  4.2 CM   Comment: MILD HEMOLYSIS DETECTED. The result may be falsely elevated due to hemolysis or other interferents. Clinical correlation is recommended. Repeat testing may be considered.   Chloride  98 - 107 mmol/L 108 High  107 107 109 High  106 105 108 High    Bicarbonate  21 - 32 mmol/L 16 Low  19 Low  22 20 Low  23 26 24   Anion Gap  10 - 20 mmol/L 16 17 15 14 14 10 13   Urea Nitrogen  6 - 23 mg/dL 14 16 16 13 17 26 High  17   Creatinine  0.50 - 1.05 mg/dL 0.80 0.84 0.88 0.79 0.84 0.83 0.72   eGFR  >60 mL/min/1.73m*2 80 75 CM 71 CM 81 CM 75 CM 76 CM    Comment: Calculations of estimated GFR are performed using the 2021 CKD-EPI Study Refit equation without the race variable for the IDMS-Traceable creatinine methods.  https://jasn.asnjournals.org/content/early/2021/09/22/ASN.2471531638   Calcium  8.6 - 10.3 mg/dL 8.4 Low  8.9 8.5 Low  8.7 8.9 8.5 Low  9.5   [facility]               Specimen Collected: 01/07/24 04:22 Last Resulted: 01/07/24 04:57           Assessment/Plan    Problem List Items Addressed This Visit       Essential hypertension     Review BP readings. Continue with Metoprolol ,Lisinopril, and Amlodipine         Hypothyroidism     Levothyroxine 25 mcg po daily. TSH 1.53 on 3/23/23.         Longstanding persistent atrial fibrillation (CMS/HCC)     Controlled rate on beta blockers. Now on Eliquis.         Dysphagia - Primary     Speech therapy following. Now on Regular diet, Pureed texture, Nectar consistency.         Current use of anticoagulant therapy     Apixaban 5 mg po bid            Time:  I spent 45 minutes or greater with the patient. Greater than 50% of this time was spent in counseling and or coordination of care. The time includes prep time of reviewing vital signs, report from direct nursing staff and or therapists, hospital documentation, reviewing labs, radiographs, diagnostic tests and or consultations, time directly spent with the patient interviewing, examining, and education regarding diagnosis, treatments, and medications, as well as documentation in the electronic medical record, and reviewing the plan of care and any new orders with the patient, nursing staff and other staff directly related to the patients care.      Harshad Smalls PA-C

## 2024-01-11 PROBLEM — Z79.01 CURRENT USE OF ANTICOAGULANT THERAPY: Status: ACTIVE | Noted: 2024-01-11

## 2024-01-11 ASSESSMENT — ENCOUNTER SYMPTOMS
ARTHRALGIAS: 0
CHILLS: 0
CHANGE IN BOWEL HABIT: 0
HEADACHES: 0
NUMBNESS: 0
COUGH: 0
FEVER: 0
NAUSEA: 0

## 2024-01-11 NOTE — CARE PLAN
[place]  facilities notified via  email that pt  has been discharged and is no longer  at [facility].     Inga Garcia, MSW, LSW

## 2024-01-12 ENCOUNTER — NURSING HOME VISIT (OUTPATIENT)
Dept: POST ACUTE CARE | Facility: EXTERNAL LOCATION | Age: 69
End: 2024-01-12
Payer: COMMERCIAL

## 2024-01-12 DIAGNOSIS — F10.21 HISTORY OF ALCOHOLISM (MULTI): Primary | ICD-10-CM

## 2024-01-12 DIAGNOSIS — I63.9 CEREBROVASCULAR ACCIDENT (CVA), UNSPECIFIED MECHANISM (MULTI): ICD-10-CM

## 2024-01-12 PROCEDURE — 99306 1ST NF CARE HIGH MDM 50: CPT | Performed by: INTERNAL MEDICINE

## 2024-01-12 NOTE — PROGRESS NOTES
Subjective   Patient ID: Zo Nunez is a 68 y.o. female who presents for No chief complaint on file..  HPI  Recent hospital admission CVA with secondary dysphagia dysarthria    Difficult to obtain history due to dysarthria however patient nods head yes that she is slowly improving              Health Maintenance:      Colonoscopy:      Mammogram:      Pelvic/Pap:      Low dose chest CT:      Aorta duplex:      Optho:      Podiatry:        Vaccines:      Prevnar 20:      Prevnar 13:      Pneumovax 23:      Tdap:      Shingrix:      COVID:      Influenza:        ROS: Unable to obtain reliably due to dysarthria from stroke      General: denies fever/chills/weight loss      Head: denies HA/trauma/masses/dizziness      Eyes: denies vision change/loss of vision/blurry vision/diplopia/eye pain      Ears: denies hearing loss/tinnitus/otalgia/otorrhea      Nose: denies nasal drainage/anosmia      Throat: denies dysphagia/odynophagia      Lymphatics: denies lymph node swelling      Cardiac: denies CP/palpitations/orthopnea/PND      Pulmonary: denies dyspnea/cough/wheezing      GI: denies abd pain/n/v/diarrhea/melena/hematochezia/hematemesis      : denies dysuria/hematuria/change frequency      Genital: denies genital discharge/lesions      Skin: denies rashes/lesions/masses      MSK: denies weakness/swelling/edema/gait imbalance/pain      Neuro: denies paresthesias/seizures/dysarthria      Psych: denies depression/anxiety/suicidal or homicidal ideations            Objective   There were no vitals taken for this visit.     Physical Exam:     General: AO3, NAD     Head: atraumatic/NC     Eyes: EOMI/PERRLA. Negative APD     Ears: TM pearly gray, EAC clear. No lesions or erythema     Nose: symmetric nares, no discharge     Throat: trachea midline, uvula midline pink mucosa. No thyromegaly     Lymphatics: no cervical/supraclavicular/ant or posterior cervical adenopathy/axillary/inguinal adenopathy     Breast: not examined      "Chest: no deformity or tenderness to palpation     Pulm: CTA b/l, no wheeze/rhonchi/rales. nonlabored     Cardiac: RRR +s1s2, no m/r/g.      GI: soft, NT/ND. Normoactive Bsx4. No rebound/guarding.     Rectal: no examined     MSK: Right upper extremity PICC line 5/5 strength UE LE. No edema/clubbing/cyanosis     Skin: no rashes/lesions     Vascular: 2+ palp DP PT radials b/l. Negative carotid bruit     Neuro: Dysarthria CNII-XII intact. No focal deficits. Reflexes 2/4 brachioradialis bicep tricep patellar achilles. Finger to nose intact.     Psych: appropriate mood/affect                    No results found for: \"BMPR1A\", \"CBCDIF\"      Assessment/Plan   Diagnoses and all orders for this visit:  History of alcoholism (CMS/HCC)  Cerebrovascular accident (CVA), unspecified mechanism (CMS/HCC)       Continue PT OT speech therapy    DC right PICC line if nonuse    Jonathan Ye, DO  "

## 2024-01-12 NOTE — LETTER
Patient: Zo Nunez  : 1955    Encounter Date: 2024    Subjective  Patient ID: Zo Nunez is a 68 y.o. female who presents for No chief complaint on file..  HPI  Recent hospital admission CVA with secondary dysphagia dysarthria    Difficult to obtain history due to dysarthria however patient nods head yes that she is slowly improving              Health Maintenance:      Colonoscopy:      Mammogram:      Pelvic/Pap:      Low dose chest CT:      Aorta duplex:      Optho:      Podiatry:        Vaccines:      Prevnar 20:      Prevnar 13:      Pneumovax 23:      Tdap:      Shingrix:      COVID:      Influenza:        ROS: Unable to obtain reliably due to dysarthria from stroke      General: denies fever/chills/weight loss      Head: denies HA/trauma/masses/dizziness      Eyes: denies vision change/loss of vision/blurry vision/diplopia/eye pain      Ears: denies hearing loss/tinnitus/otalgia/otorrhea      Nose: denies nasal drainage/anosmia      Throat: denies dysphagia/odynophagia      Lymphatics: denies lymph node swelling      Cardiac: denies CP/palpitations/orthopnea/PND      Pulmonary: denies dyspnea/cough/wheezing      GI: denies abd pain/n/v/diarrhea/melena/hematochezia/hematemesis      : denies dysuria/hematuria/change frequency      Genital: denies genital discharge/lesions      Skin: denies rashes/lesions/masses      MSK: denies weakness/swelling/edema/gait imbalance/pain      Neuro: denies paresthesias/seizures/dysarthria      Psych: denies depression/anxiety/suicidal or homicidal ideations            Objective  There were no vitals taken for this visit.     Physical Exam:     General: AO3, NAD     Head: atraumatic/NC     Eyes: EOMI/PERRLA. Negative APD     Ears: TM pearly gray, EAC clear. No lesions or erythema     Nose: symmetric nares, no discharge     Throat: trachea midline, uvula midline pink mucosa. No thyromegaly     Lymphatics: no cervical/supraclavicular/ant or posterior  "cervical adenopathy/axillary/inguinal adenopathy     Breast: not examined     Chest: no deformity or tenderness to palpation     Pulm: CTA b/l, no wheeze/rhonchi/rales. nonlabored     Cardiac: RRR +s1s2, no m/r/g.      GI: soft, NT/ND. Normoactive Bsx4. No rebound/guarding.     Rectal: no examined     MSK: Right upper extremity PICC line 5/5 strength UE LE. No edema/clubbing/cyanosis     Skin: no rashes/lesions     Vascular: 2+ palp DP PT radials b/l. Negative carotid bruit     Neuro: Dysarthria CNII-XII intact. No focal deficits. Reflexes 2/4 brachioradialis bicep tricep patellar achilles. Finger to nose intact.     Psych: appropriate mood/affect                    No results found for: \"BMPR1A\", \"CBCDIF\"      Assessment/Plan  Diagnoses and all orders for this visit:  History of alcoholism (CMS/HCC)  Cerebrovascular accident (CVA), unspecified mechanism (CMS/HCC)       Continue PT OT speech therapy    DC right PICC line if nonuse    Jonathan Ye DO      Electronically Signed By: Jonathan Ye DO   1/12/24 12:38 PM  "

## 2024-01-17 ENCOUNTER — NURSING HOME VISIT (OUTPATIENT)
Dept: POST ACUTE CARE | Facility: EXTERNAL LOCATION | Age: 69
End: 2024-01-17
Payer: COMMERCIAL

## 2024-01-17 VITALS
RESPIRATION RATE: 16 BRPM | HEIGHT: 64 IN | SYSTOLIC BLOOD PRESSURE: 160 MMHG | DIASTOLIC BLOOD PRESSURE: 80 MMHG | OXYGEN SATURATION: 95 % | BODY MASS INDEX: 24.75 KG/M2 | HEART RATE: 86 BPM | TEMPERATURE: 97.2 F | WEIGHT: 145 LBS

## 2024-01-17 DIAGNOSIS — I69.891 DYSPHAGIA AS LATE EFFECT OF CEREBRAL ANEURYSM: ICD-10-CM

## 2024-01-17 DIAGNOSIS — Z79.01 CURRENT USE OF ANTICOAGULANT THERAPY: ICD-10-CM

## 2024-01-17 DIAGNOSIS — I10 ESSENTIAL HYPERTENSION: Primary | ICD-10-CM

## 2024-01-17 DIAGNOSIS — I48.11 LONGSTANDING PERSISTENT ATRIAL FIBRILLATION (MULTI): ICD-10-CM

## 2024-01-17 PROCEDURE — 99308 SBSQ NF CARE LOW MDM 20: CPT | Performed by: PHYSICIAN ASSISTANT

## 2024-01-17 NOTE — LETTER
"Patient: Zo Nunez  : 1955    Encounter Date: 2024  Name: Zo Nunez  YOB: 1955    Chief complaint: Follow up for hypertension.    HPI: Patient seen and examined in her room. Review of chart shows systolic blood pressure averaging between 140-150 mm/Hg. Today blood pressure was 160/ 80 this morning and 101/65 at bedtime last night. Patient has been complaint with medications. Currently prescribed Coreg, Amlodipine, Nifedipine, Lisinopril,Spironolactone, and Hydralazine. She denies headache, dizziness, sob, chest pain or anxiety.    Hypertension  The current episode started more than 1 year ago. The problem has been gradually improving since onset. The problem is controlled. Pertinent negatives include no anxiety, blurred vision, chest pain, headaches, palpitations, peripheral edema or shortness of breath. There are no associated agents to hypertension. Risk factors for coronary artery disease include stress. Past treatments include beta blockers, calcium channel blockers and ACE inhibitors. There are no compliance problems.  Hypertensive end-organ damage includes CVA. Identifiable causes of hypertension include a thyroid problem.     Review of systems:   ROS negative except were noted in HPI.    Code Status: full code    /80   Pulse 86   Temp 36.2 °C (97.2 °F)   Resp 16   Ht 1.626 m (5' 4\")   Wt 65.8 kg (145 lb)   SpO2 95%   BMI 24.89 kg/m²      Physical Exam  Constitutional:       General: She is not in acute distress.     Appearance: Normal appearance.   HENT:      Head: Normocephalic.      Nose: Nose normal. No congestion.      Mouth/Throat:      Mouth: Mucous membranes are moist.   Eyes:      Extraocular Movements: Extraocular movements intact.      Pupils: Pupils are equal, round, and reactive to light.   Cardiovascular:      Rate and Rhythm: Normal rate and regular rhythm.      Pulses: Normal pulses.      Heart sounds: Normal heart sounds. "   Pulmonary:      Effort: Pulmonary effort is normal.      Breath sounds: Normal breath sounds. No wheezing or rales.   Abdominal:      General: Bowel sounds are normal. There is no distension.      Palpations: Abdomen is soft.      Tenderness: There is no abdominal tenderness. There is no guarding.   Genitourinary:     Comments: Voiding.  Musculoskeletal:      Cervical back: Normal range of motion.      Comments: Mild R sided weakness.   Lymphadenopathy:      Cervical: No cervical adenopathy.   Skin:     General: Skin is warm and dry.      Capillary Refill: Capillary refill takes less than 2 seconds.   Neurological:      Mental Status: She is alert.      Comments: Dysarthric speech.   Psychiatric:         Mood and Affect: Mood normal.    Medications reviewed during visit at facility.  Aldactazide 20-25 po daily  Apixaban 5 mg po bid  Montelukast 10 mg po daily  Prilosec 40 mg po daily  Metoprolol succinate 50 mg po daily  Lisinopril 40 mg po daily  Levothyroxine 25 mcg po daily  Fluticasone 50 mcg one spray each nostril daily  Fish oil 1000 mg po daily  Vitamin D 2 1.25 mg q am on day  Cyclobenzaprine 5 mg q 12 hours  Vitamin B 12 1000 mcg IM q month  Coreg 25 mg po bid  Atorvastatin 5 mg po daily  Amlodipine 10 mg po daily  Albuterol/ Budesonide 90/80 one puff q 6 hours prn  Labs reviewed at facility:    Assessment/Plan   Problem List Items Addressed This Visit       Essential hypertension - Primary     Blood pressure controlled on current medications. Discuss with patient the need for compliance with BP medication to prevent future CVA. Review labs.         Longstanding persistent atrial fibrillation (CMS/HCC)     Controlled rate on beta blockers.           Dysphagia as late effect of cerebral aneurysm     Tolerating Regular diet, Pureed texture, Thin consistency         Current use of anticoagulant therapy     Apixaban 5 mg po bid. Monitor for bleeding or bruising.            Time:    Harshad Smalls PA-C        Electronically Signed By: Harshad Smalls PA-C   1/18/24 11:22 PM

## 2024-01-18 ASSESSMENT — ENCOUNTER SYMPTOMS
SHORTNESS OF BREATH: 0
BLURRED VISION: 0
PALPITATIONS: 0
HEADACHES: 0
HYPERTENSION: 1

## 2024-01-18 NOTE — PROGRESS NOTES
"1/17/2024  Name: Zo Nunez  YOB: 1955    Chief complaint: Follow up for hypertension.    HPI: Patient seen and examined in her room. Review of chart shows systolic blood pressure averaging between 140-150 mm/Hg. Today blood pressure was 160/ 80 this morning and 101/65 at bedtime last night. Patient has been complaint with medications. Currently prescribed Coreg, Amlodipine, Nifedipine, Lisinopril,Spironolactone, and Hydralazine. She denies headache, dizziness, sob, chest pain or anxiety.    Hypertension  The current episode started more than 1 year ago. The problem has been gradually improving since onset. The problem is controlled. Pertinent negatives include no anxiety, blurred vision, chest pain, headaches, palpitations, peripheral edema or shortness of breath. There are no associated agents to hypertension. Risk factors for coronary artery disease include stress. Past treatments include beta blockers, calcium channel blockers and ACE inhibitors. There are no compliance problems.  Hypertensive end-organ damage includes CVA. Identifiable causes of hypertension include a thyroid problem.     Review of systems:   ROS negative except were noted in HPI.    Code Status: full code    /80   Pulse 86   Temp 36.2 °C (97.2 °F)   Resp 16   Ht 1.626 m (5' 4\")   Wt 65.8 kg (145 lb)   SpO2 95%   BMI 24.89 kg/m²      Physical Exam  Constitutional:       General: She is not in acute distress.     Appearance: Normal appearance.   HENT:      Head: Normocephalic.      Nose: Nose normal. No congestion.      Mouth/Throat:      Mouth: Mucous membranes are moist.   Eyes:      Extraocular Movements: Extraocular movements intact.      Pupils: Pupils are equal, round, and reactive to light.   Cardiovascular:      Rate and Rhythm: Normal rate and regular rhythm.      Pulses: Normal pulses.      Heart sounds: Normal heart sounds.   Pulmonary:      Effort: Pulmonary effort is normal.      Breath sounds: Normal " breath sounds. No wheezing or rales.   Abdominal:      General: Bowel sounds are normal. There is no distension.      Palpations: Abdomen is soft.      Tenderness: There is no abdominal tenderness. There is no guarding.   Genitourinary:     Comments: Voiding.  Musculoskeletal:      Cervical back: Normal range of motion.      Comments: Mild R sided weakness.   Lymphadenopathy:      Cervical: No cervical adenopathy.   Skin:     General: Skin is warm and dry.      Capillary Refill: Capillary refill takes less than 2 seconds.   Neurological:      Mental Status: She is alert.      Comments: Dysarthric speech.   Psychiatric:         Mood and Affect: Mood normal.    Medications reviewed during visit at facility.  Aldactazide 20-25 po daily  Apixaban 5 mg po bid  Montelukast 10 mg po daily  Prilosec 40 mg po daily  Metoprolol succinate 50 mg po daily  Lisinopril 40 mg po daily  Levothyroxine 25 mcg po daily  Fluticasone 50 mcg one spray each nostril daily  Fish oil 1000 mg po daily  Vitamin D 2 1.25 mg q am on day  Cyclobenzaprine 5 mg q 12 hours  Vitamin B 12 1000 mcg IM q month  Coreg 25 mg po bid  Atorvastatin 5 mg po daily  Amlodipine 10 mg po daily  Albuterol/ Budesonide 90/80 one puff q 6 hours prn  Labs reviewed at facility:    Assessment/Plan    Problem List Items Addressed This Visit       Essential hypertension - Primary     Blood pressure controlled on current medications. Discuss with patient the need for compliance with BP medication to prevent future CVA. Review labs.         Longstanding persistent atrial fibrillation (CMS/HCC)     Controlled rate on beta blockers.           Dysphagia as late effect of cerebral aneurysm     Tolerating Regular diet, Pureed texture, Thin consistency         Current use of anticoagulant therapy     Apixaban 5 mg po bid. Monitor for bleeding or bruising.            Time:    Harshad Smalls PA-C

## 2024-01-19 NOTE — ASSESSMENT & PLAN NOTE
Blood pressure controlled on current medications. Discuss with patient the need for compliance with BP medication to prevent future CVA. Review labs.

## 2024-01-24 ENCOUNTER — NURSING HOME VISIT (OUTPATIENT)
Dept: POST ACUTE CARE | Facility: EXTERNAL LOCATION | Age: 69
End: 2024-01-24
Payer: COMMERCIAL

## 2024-01-24 DIAGNOSIS — Z78.9 IMPAIRED MOBILITY AND ACTIVITIES OF DAILY LIVING: Primary | ICD-10-CM

## 2024-01-24 DIAGNOSIS — I10 ESSENTIAL HYPERTENSION: ICD-10-CM

## 2024-01-24 DIAGNOSIS — Z74.09 IMPAIRED MOBILITY AND ACTIVITIES OF DAILY LIVING: Primary | ICD-10-CM

## 2024-01-24 DIAGNOSIS — R47.01 EXPRESSIVE APHASIA: ICD-10-CM

## 2024-01-24 PROCEDURE — 99308 SBSQ NF CARE LOW MDM 20: CPT | Performed by: PHYSICIAN ASSISTANT

## 2024-01-24 NOTE — LETTER
"Patient: Zo Nunez  : 1955    Encounter Date: 2024  Name: Zo Nunez  YOB: 1955    Chief complaint: Impaired mobility. CVA.    HPI: Patient seen and examined in her room. She reports feeling \" good \" and offers no new complaints. Patient has been working with physical and occupational therapy to improve balance, strength and endurance. She is able to walk > 200 feet with walker during supervised ambulation. Speech therapy is also helping patient with expressive aphasia. Blood pressure is well controlled on current medications.    Cerebrovascular Accident  The problem occurs daily. The problem has been gradually improving. Pertinent negatives include no abdominal pain, anorexia, change in bowel habit, chest pain, chills, coughing, fever, headaches, nausea or urinary symptoms. Nothing aggravates the symptoms. She has tried relaxation, walking and rest for the symptoms. The treatment provided moderate relief.     Review of systems:   ROS negative except were noted in HPI.    Code Status: full code    /61   Pulse 72   Temp 36.8 °C (98.2 °F)   Resp 18   Ht 1.626 m (5' 4\")   Wt 65.8 kg (145 lb)   SpO2 96%   BMI 24.89 kg/m²      Physical Exam  Constitutional:       General: She is not in acute distress.     Appearance: Normal appearance.   HENT:      Head: Normocephalic.      Nose: Nose normal. No congestion.      Mouth/Throat:      Mouth: Mucous membranes are moist.   Eyes:      Extraocular Movements: Extraocular movements intact.      Pupils: Pupils are equal, round, and reactive to light.   Cardiovascular:      Rate and Rhythm: Normal rate and regular rhythm.      Pulses: Normal pulses.      Heart sounds: Normal heart sounds.   Pulmonary:      Effort: Pulmonary effort is normal.      Breath sounds: Normal breath sounds. No wheezing or rales.   Abdominal:      General: Bowel sounds are normal. There is no distension.      Palpations: Abdomen is soft.      " Tenderness: There is no abdominal tenderness. There is no guarding.   Genitourinary:     Comments: Voiding.  Musculoskeletal:      Cervical back: Normal range of motion.      Comments: Mild R sided weakness.   Lymphadenopathy:      Cervical: No cervical adenopathy.   Skin:     General: Skin is warm and dry.      Capillary Refill: Capillary refill takes less than 2 seconds.   Neurological:      Mental Status: She is alert.      Comments: Dysarthric speech.   Psychiatric:         Mood and Affect: Mood normal.    Medications reviewed during visit at facility.  Aldactazide 20-25 po daily  Apixaban 5 mg po bid  Montelukast 10 mg po daily  Prilosec 40 mg po daily  Metoprolol succinate 50 mg po daily  Lisinopril 40 mg po daily  Levothyroxine 25 mcg po daily  Fluticasone 50 mcg one spray each nostril daily  Fish oil 1000 mg po daily  Vitamin D 2 1.25 mg q am on day  Cyclobenzaprine 5 mg q 12 hours  Vitamin B 12 1000 mcg IM q month  Coreg 25 mg po bid  Atorvastatin 5 mg po daily  Amlodipine 10 mg po daily  Albuterol/ Budesonide 90/80 one puff q 6 hours prn  Labs reviewed at facility:    Assessment/Plan   Problem List Items Addressed This Visit       Essential hypertension     Blood pressure controlled on current medications. Discuss with patient the need for compliance with BP medication to prevent future CVA. Review la          Expressive aphasia     Speech therapy teaching 3 strategies to improve expressive language- gestures, circumlocution, and decreased environmental stimuli. Patient showing some progress.          Impaired mobility and activities of daily living - Primary     Review physical and occupational therapy notes. Now walking > 200 feet with steady gait.            Time:    Harshad Smalls PA-C       Electronically Signed By: Harshad Smalls PA-C   1/27/24  3:16 PM

## 2024-01-25 VITALS
WEIGHT: 145 LBS | SYSTOLIC BLOOD PRESSURE: 109 MMHG | RESPIRATION RATE: 18 BRPM | HEART RATE: 72 BPM | OXYGEN SATURATION: 96 % | TEMPERATURE: 98.2 F | DIASTOLIC BLOOD PRESSURE: 61 MMHG | HEIGHT: 64 IN | BODY MASS INDEX: 24.75 KG/M2

## 2024-01-26 NOTE — PROGRESS NOTES
"1/24/2024  Name: Zo Nunez  YOB: 1955    Chief complaint: Impaired mobility. CVA.    HPI: Patient seen and examined in her room. She reports feeling \" good \" and offers no new complaints. Patient has been working with physical and occupational therapy to improve balance, strength and endurance. She is able to walk > 200 feet with walker during supervised ambulation. Speech therapy is also helping patient with expressive aphasia. Blood pressure is well controlled on current medications.    Cerebrovascular Accident  The problem occurs daily. The problem has been gradually improving. Pertinent negatives include no abdominal pain, anorexia, change in bowel habit, chest pain, chills, coughing, fever, headaches, nausea or urinary symptoms. Nothing aggravates the symptoms. She has tried relaxation, walking and rest for the symptoms. The treatment provided moderate relief.     Review of systems:   ROS negative except were noted in HPI.    Code Status: full code    /61   Pulse 72   Temp 36.8 °C (98.2 °F)   Resp 18   Ht 1.626 m (5' 4\")   Wt 65.8 kg (145 lb)   SpO2 96%   BMI 24.89 kg/m²      Physical Exam  Constitutional:       General: She is not in acute distress.     Appearance: Normal appearance.   HENT:      Head: Normocephalic.      Nose: Nose normal. No congestion.      Mouth/Throat:      Mouth: Mucous membranes are moist.   Eyes:      Extraocular Movements: Extraocular movements intact.      Pupils: Pupils are equal, round, and reactive to light.   Cardiovascular:      Rate and Rhythm: Normal rate and regular rhythm.      Pulses: Normal pulses.      Heart sounds: Normal heart sounds.   Pulmonary:      Effort: Pulmonary effort is normal.      Breath sounds: Normal breath sounds. No wheezing or rales.   Abdominal:      General: Bowel sounds are normal. There is no distension.      Palpations: Abdomen is soft.      Tenderness: There is no abdominal tenderness. There is no guarding. "   Genitourinary:     Comments: Voiding.  Musculoskeletal:      Cervical back: Normal range of motion.      Comments: Mild R sided weakness.   Lymphadenopathy:      Cervical: No cervical adenopathy.   Skin:     General: Skin is warm and dry.      Capillary Refill: Capillary refill takes less than 2 seconds.   Neurological:      Mental Status: She is alert.      Comments: Dysarthric speech.   Psychiatric:         Mood and Affect: Mood normal.    Medications reviewed during visit at facility.  Aldactazide 20-25 po daily  Apixaban 5 mg po bid  Montelukast 10 mg po daily  Prilosec 40 mg po daily  Metoprolol succinate 50 mg po daily  Lisinopril 40 mg po daily  Levothyroxine 25 mcg po daily  Fluticasone 50 mcg one spray each nostril daily  Fish oil 1000 mg po daily  Vitamin D 2 1.25 mg q am on day  Cyclobenzaprine 5 mg q 12 hours  Vitamin B 12 1000 mcg IM q month  Coreg 25 mg po bid  Atorvastatin 5 mg po daily  Amlodipine 10 mg po daily  Albuterol/ Budesonide 90/80 one puff q 6 hours prn  Labs reviewed at facility:    Assessment/Plan    Problem List Items Addressed This Visit       Essential hypertension     Blood pressure controlled on current medications. Discuss with patient the need for compliance with BP medication to prevent future CVA. Review la          Expressive aphasia     Speech therapy teaching 3 strategies to improve expressive language- gestures, circumlocution, and decreased environmental stimuli. Patient showing some progress.          Impaired mobility and activities of daily living - Primary     Review physical and occupational therapy notes. Now walking > 200 feet with steady gait.            Time:    Harshad Smalls PA-C

## 2024-01-27 ASSESSMENT — ENCOUNTER SYMPTOMS
HEADACHES: 0
ABDOMINAL PAIN: 0
NAUSEA: 0
ANOREXIA: 0
CHANGE IN BOWEL HABIT: 0
CHILLS: 0
FEVER: 0
COUGH: 0

## 2024-01-27 NOTE — ASSESSMENT & PLAN NOTE
Speech therapy teaching 3 strategies to improve expressive language- gestures, circumlocution, and decreased environmental stimuli. Patient showing some progress.

## 2024-01-27 NOTE — ASSESSMENT & PLAN NOTE
Blood pressure controlled on current medications. Discuss with patient the need for compliance with BP medication to prevent future CVA. Review la

## 2024-02-27 ENCOUNTER — OFFICE VISIT (OUTPATIENT)
Dept: PRIMARY CARE | Facility: CLINIC | Age: 69
End: 2024-02-27
Payer: COMMERCIAL

## 2024-02-27 VITALS — WEIGHT: 155 LBS | SYSTOLIC BLOOD PRESSURE: 140 MMHG | DIASTOLIC BLOOD PRESSURE: 80 MMHG | BODY MASS INDEX: 26.61 KG/M2

## 2024-02-27 DIAGNOSIS — K21.00 GASTROESOPHAGEAL REFLUX DISEASE WITH ESOPHAGITIS WITHOUT HEMORRHAGE: ICD-10-CM

## 2024-02-27 DIAGNOSIS — I10 ESSENTIAL HYPERTENSION: ICD-10-CM

## 2024-02-27 DIAGNOSIS — E53.8 VITAMIN B12 DEFICIENCY: ICD-10-CM

## 2024-02-27 DIAGNOSIS — I48.11 LONGSTANDING PERSISTENT ATRIAL FIBRILLATION (MULTI): ICD-10-CM

## 2024-02-27 DIAGNOSIS — Z00.00 HEALTHCARE MAINTENANCE: ICD-10-CM

## 2024-02-27 DIAGNOSIS — E03.9 HYPOTHYROIDISM, UNSPECIFIED TYPE: Primary | ICD-10-CM

## 2024-02-27 DIAGNOSIS — I63.9 CEREBROVASCULAR ACCIDENT (CVA), UNSPECIFIED MECHANISM (MULTI): ICD-10-CM

## 2024-02-27 DIAGNOSIS — H61.23 BILATERAL IMPACTED CERUMEN: ICD-10-CM

## 2024-02-27 DIAGNOSIS — J45.20 MILD INTERMITTENT ASTHMA WITHOUT COMPLICATION (HHS-HCC): ICD-10-CM

## 2024-02-27 DIAGNOSIS — E03.9 HYPOTHYROIDISM, UNSPECIFIED TYPE: ICD-10-CM

## 2024-02-27 DIAGNOSIS — E55.9 VITAMIN D DEFICIENCY: ICD-10-CM

## 2024-02-27 PROBLEM — F10.21 ALCOHOL DEPENDENCE, IN REMISSION (MULTI): Status: ACTIVE | Noted: 2024-01-10

## 2024-02-27 PROBLEM — I48.91 ATRIAL FIBRILLATION (MULTI): Status: ACTIVE | Noted: 2022-03-09

## 2024-02-27 PROBLEM — R74.01 ELEVATION OF LEVELS OF LIVER TRANSAMINASE LEVELS: Status: ACTIVE | Noted: 2023-03-24

## 2024-02-27 PROBLEM — R73.9 HYPERGLYCEMIA: Status: ACTIVE | Noted: 2024-02-27

## 2024-02-27 PROBLEM — I25.10 ATHEROSCLEROTIC HEART DISEASE OF NATIVE CORONARY ARTERY WITHOUT ANGINA PECTORIS: Status: ACTIVE | Noted: 2024-01-10

## 2024-02-27 PROBLEM — S37.009A INJURY OF KIDNEY: Status: ACTIVE | Noted: 2024-02-27

## 2024-02-27 PROBLEM — R73.9 ELEVATED BLOOD SUGAR: Status: ACTIVE | Noted: 2024-02-27

## 2024-02-27 PROBLEM — R61 GENERALIZED HYPERHIDROSIS: Status: ACTIVE | Noted: 2022-07-13

## 2024-02-27 PROBLEM — F41.8 SITUATIONAL ANXIETY: Status: ACTIVE | Noted: 2024-02-27

## 2024-02-27 PROBLEM — R79.89 ELEVATED SERUM CREATININE: Status: ACTIVE | Noted: 2024-02-27

## 2024-02-27 PROBLEM — R53.81 PHYSICAL DECONDITIONING: Status: ACTIVE | Noted: 2024-02-27

## 2024-02-27 PROBLEM — F41.9 ANXIETY DISORDER, UNSPECIFIED: Status: ACTIVE | Noted: 2023-12-30

## 2024-02-27 PROBLEM — J45.909 ALLERGIC BRONCHITIS (HHS-HCC): Status: ACTIVE | Noted: 2024-02-27

## 2024-02-27 PROBLEM — I69.391 DYSPHAGIA FOLLOWING CEREBRAL INFARCTION: Status: ACTIVE | Noted: 2024-01-10

## 2024-02-27 PROBLEM — E66.3 OVERWEIGHT WITH BODY MASS INDEX (BMI) 25.0-29.9: Status: ACTIVE | Noted: 2024-02-27

## 2024-02-27 PROBLEM — M19.90 ARTHRITIS: Status: ACTIVE | Noted: 2024-02-27

## 2024-02-27 PROBLEM — R42 DIZZINESS AND GIDDINESS: Status: ACTIVE | Noted: 2022-07-13

## 2024-02-27 PROBLEM — Z20.822 CONTACT WITH AND (SUSPECTED) EXPOSURE TO COVID-19: Status: ACTIVE | Noted: 2022-12-07

## 2024-02-27 PROBLEM — E51.9 VITAMIN B1 DEFICIENCY: Status: ACTIVE | Noted: 2024-02-27

## 2024-02-27 PROBLEM — I69.351 HEMIPLEGIA AND HEMIPARESIS FOLLOWING CEREBRAL INFARCTION AFFECTING RIGHT DOMINANT SIDE (MULTI): Status: ACTIVE | Noted: 2024-01-10

## 2024-02-27 PROBLEM — K59.00 CONSTIPATION: Status: ACTIVE | Noted: 2024-02-27

## 2024-02-27 PROBLEM — G56.01 CARPAL TUNNEL SYNDROME OF RIGHT WRIST: Status: ACTIVE | Noted: 2024-02-27

## 2024-02-27 PROBLEM — T79.6XXA TRAUMATIC RHABDOMYOLYSIS (CMS-HCC): Status: ACTIVE | Noted: 2024-02-27

## 2024-02-27 PROBLEM — K76.9 CHRONIC LIVER DISEASE: Status: ACTIVE | Noted: 2024-02-27

## 2024-02-27 PROBLEM — R13.10 DYSPHAGIA: Status: RESOLVED | Noted: 2024-01-06 | Resolved: 2024-02-27

## 2024-02-27 PROBLEM — I69.322 DYSARTHRIA FOLLOWING CEREBRAL INFARCTION: Status: ACTIVE | Noted: 2024-01-10

## 2024-02-27 PROBLEM — R74.01 TRANSAMINITIS: Status: ACTIVE | Noted: 2024-02-27

## 2024-02-27 PROBLEM — E51.9 THIAMIN DEFICIENCY: Status: ACTIVE | Noted: 2024-02-27

## 2024-02-27 PROBLEM — D63.8 ANEMIA IN OTHER CHRONIC DISEASES CLASSIFIED ELSEWHERE: Status: ACTIVE | Noted: 2024-01-10

## 2024-02-27 PROBLEM — R79.89 HIGH SERUM CREATININE: Status: ACTIVE | Noted: 2023-04-23

## 2024-02-27 PROBLEM — J45.909 ASTHMA (HHS-HCC): Status: ACTIVE | Noted: 2024-02-27

## 2024-02-27 PROBLEM — S21.209A BACK WOUND: Status: ACTIVE | Noted: 2024-02-27

## 2024-02-27 PROCEDURE — 1157F ADVNC CARE PLAN IN RCRD: CPT | Performed by: INTERNAL MEDICINE

## 2024-02-27 PROCEDURE — 1126F AMNT PAIN NOTED NONE PRSNT: CPT | Performed by: INTERNAL MEDICINE

## 2024-02-27 PROCEDURE — 1159F MED LIST DOCD IN RCRD: CPT | Performed by: INTERNAL MEDICINE

## 2024-02-27 PROCEDURE — 99215 OFFICE O/P EST HI 40 MIN: CPT | Performed by: INTERNAL MEDICINE

## 2024-02-27 PROCEDURE — 3079F DIAST BP 80-89 MM HG: CPT | Performed by: INTERNAL MEDICINE

## 2024-02-27 PROCEDURE — 3077F SYST BP >= 140 MM HG: CPT | Performed by: INTERNAL MEDICINE

## 2024-02-27 PROCEDURE — 1123F ACP DISCUSS/DSCN MKR DOCD: CPT | Performed by: INTERNAL MEDICINE

## 2024-02-27 RX ORDER — LEVOTHYROXINE SODIUM 25 UG/1
25 TABLET ORAL
Qty: 30 TABLET | Refills: 1 | Status: SHIPPED | OUTPATIENT
Start: 2024-02-27 | End: 2024-04-19 | Stop reason: SDUPTHER

## 2024-02-27 RX ORDER — NIFEDIPINE 30 MG/1
30 TABLET, FILM COATED, EXTENDED RELEASE ORAL DAILY
Qty: 30 TABLET | Refills: 1 | Status: SHIPPED | OUTPATIENT
Start: 2024-02-27 | End: 2024-04-19 | Stop reason: SDUPTHER

## 2024-02-27 RX ORDER — ATORVASTATIN CALCIUM 80 MG/1
80 TABLET, FILM COATED ORAL NIGHTLY
Qty: 30 TABLET | Refills: 0 | Status: SHIPPED | OUTPATIENT
Start: 2024-02-27 | End: 2024-02-27 | Stop reason: SDUPTHER

## 2024-02-27 RX ORDER — HYDRALAZINE HYDROCHLORIDE 10 MG/1
10 TABLET, FILM COATED ORAL 2 TIMES DAILY
Qty: 60 TABLET | Refills: 1 | Status: SHIPPED | OUTPATIENT
Start: 2024-02-27 | End: 2024-02-27 | Stop reason: SDUPTHER

## 2024-02-27 RX ORDER — MONTELUKAST SODIUM 10 MG/1
10 TABLET ORAL NIGHTLY
Qty: 30 TABLET | Refills: 1 | Status: SHIPPED | OUTPATIENT
Start: 2024-02-27 | End: 2024-04-22

## 2024-02-27 RX ORDER — SPIRONOLACTONE AND HYDROCHLOROTHIAZIDE 25; 25 MG/1; MG/1
1 TABLET ORAL DAILY
Qty: 30 TABLET | Refills: 1 | Status: SHIPPED | OUTPATIENT
Start: 2024-02-27 | End: 2024-04-22

## 2024-02-27 RX ORDER — AMLODIPINE BESYLATE 10 MG/1
10 TABLET ORAL DAILY
Qty: 30 TABLET | Refills: 1 | Status: SHIPPED | OUTPATIENT
Start: 2024-02-27 | End: 2024-04-19 | Stop reason: SDUPTHER

## 2024-02-27 RX ORDER — LISINOPRIL 40 MG/1
40 TABLET ORAL DAILY
Qty: 30 TABLET | Refills: 1 | Status: SHIPPED | OUTPATIENT
Start: 2024-02-27 | End: 2024-02-27 | Stop reason: SDUPTHER

## 2024-02-27 RX ORDER — HYDRALAZINE HYDROCHLORIDE 10 MG/1
10 TABLET, FILM COATED ORAL 2 TIMES DAILY
Qty: 60 TABLET | Refills: 1 | Status: SHIPPED | OUTPATIENT
Start: 2024-02-27 | End: 2024-04-19 | Stop reason: SDUPTHER

## 2024-02-27 RX ORDER — CARVEDILOL 25 MG/1
25 TABLET ORAL 2 TIMES DAILY
Qty: 60 TABLET | Refills: 0 | Status: SHIPPED | OUTPATIENT
Start: 2024-02-27 | End: 2024-02-27 | Stop reason: SDUPTHER

## 2024-02-27 RX ORDER — ALBUTEROL SULFATE 90 UG/1
1 AEROSOL, METERED RESPIRATORY (INHALATION) EVERY 6 HOURS PRN
Qty: 18 G | Refills: 1 | Status: SHIPPED | OUTPATIENT
Start: 2024-02-27

## 2024-02-27 RX ORDER — ATORVASTATIN CALCIUM 80 MG/1
80 TABLET, FILM COATED ORAL NIGHTLY
Qty: 30 TABLET | Refills: 0 | Status: SHIPPED | OUTPATIENT
Start: 2024-02-27 | End: 2024-03-26

## 2024-02-27 RX ORDER — AMLODIPINE BESYLATE 10 MG/1
10 TABLET ORAL DAILY
Qty: 30 TABLET | Refills: 1 | Status: SHIPPED | OUTPATIENT
Start: 2024-02-27 | End: 2024-02-27 | Stop reason: SDUPTHER

## 2024-02-27 RX ORDER — LANOLIN ALCOHOL/MO/W.PET/CERES
100 CREAM (GRAM) TOPICAL 3 TIMES DAILY
COMMUNITY
Start: 2022-03-18 | End: 2024-02-27 | Stop reason: ALTCHOICE

## 2024-02-27 RX ORDER — CARVEDILOL 25 MG/1
25 TABLET ORAL 2 TIMES DAILY
Qty: 60 TABLET | Refills: 0 | Status: SHIPPED | OUTPATIENT
Start: 2024-02-27 | End: 2024-03-26

## 2024-02-27 RX ORDER — FLUTICASONE PROPIONATE 50 MCG
1 SPRAY, SUSPENSION (ML) NASAL DAILY
Qty: 16 G | Refills: 1 | Status: SHIPPED | OUTPATIENT
Start: 2024-02-27 | End: 2024-02-27 | Stop reason: SDUPTHER

## 2024-02-27 RX ORDER — ALBUTEROL SULFATE 90 UG/1
1 AEROSOL, METERED RESPIRATORY (INHALATION) EVERY 6 HOURS PRN
Qty: 18 G | Refills: 1 | Status: SHIPPED | OUTPATIENT
Start: 2024-02-27 | End: 2024-02-27 | Stop reason: SDUPTHER

## 2024-02-27 RX ORDER — ERGOCALCIFEROL 1.25 MG/1
1.25 CAPSULE ORAL
Qty: 4 CAPSULE | Refills: 1 | Status: SHIPPED | OUTPATIENT
Start: 2024-02-27 | End: 2024-04-27

## 2024-02-27 RX ORDER — GABAPENTIN 100 MG/1
200 CAPSULE ORAL 2 TIMES DAILY
COMMUNITY
End: 2024-02-27 | Stop reason: WASHOUT

## 2024-02-27 RX ORDER — SPIRONOLACTONE AND HYDROCHLOROTHIAZIDE 25; 25 MG/1; MG/1
1 TABLET ORAL DAILY
Qty: 30 TABLET | Refills: 1 | Status: SHIPPED | OUTPATIENT
Start: 2024-02-27 | End: 2024-02-27 | Stop reason: SDUPTHER

## 2024-02-27 RX ORDER — LISINOPRIL 40 MG/1
40 TABLET ORAL DAILY
Qty: 30 TABLET | Refills: 1 | Status: SHIPPED | OUTPATIENT
Start: 2024-02-27 | End: 2024-04-19 | Stop reason: SDUPTHER

## 2024-02-27 RX ORDER — ERGOCALCIFEROL 1.25 MG/1
1.25 CAPSULE ORAL
Qty: 4 CAPSULE | Refills: 1 | Status: SHIPPED | OUTPATIENT
Start: 2024-02-27 | End: 2024-02-27 | Stop reason: SDUPTHER

## 2024-02-27 RX ORDER — LEVOTHYROXINE SODIUM 25 UG/1
25 TABLET ORAL
Qty: 30 TABLET | Refills: 1 | Status: SHIPPED | OUTPATIENT
Start: 2024-02-27 | End: 2024-02-27 | Stop reason: SDUPTHER

## 2024-02-27 RX ORDER — FLUTICASONE PROPIONATE 50 MCG
1 SPRAY, SUSPENSION (ML) NASAL DAILY
Qty: 16 G | Refills: 1 | Status: SHIPPED | OUTPATIENT
Start: 2024-02-27 | End: 2024-05-16

## 2024-02-27 RX ORDER — OMEPRAZOLE 40 MG/1
40 CAPSULE, DELAYED RELEASE ORAL
Qty: 30 CAPSULE | Refills: 1 | Status: SHIPPED | OUTPATIENT
Start: 2024-02-27 | End: 2024-04-19 | Stop reason: SDUPTHER

## 2024-02-27 RX ORDER — ASPIRIN 81 MG/1
81 TABLET ORAL DAILY
COMMUNITY
End: 2024-02-27 | Stop reason: ALTCHOICE

## 2024-02-27 RX ORDER — MONTELUKAST SODIUM 10 MG/1
10 TABLET ORAL NIGHTLY
Qty: 30 TABLET | Refills: 1 | Status: SHIPPED | OUTPATIENT
Start: 2024-02-27 | End: 2024-02-27 | Stop reason: SDUPTHER

## 2024-02-27 RX ORDER — NIFEDIPINE 30 MG/1
30 TABLET, FILM COATED, EXTENDED RELEASE ORAL DAILY
Qty: 30 TABLET | Refills: 1 | Status: SHIPPED | OUTPATIENT
Start: 2024-02-27 | End: 2024-02-27 | Stop reason: SDUPTHER

## 2024-02-27 RX ORDER — OMEPRAZOLE 40 MG/1
40 CAPSULE, DELAYED RELEASE ORAL
Qty: 30 CAPSULE | Refills: 1 | Status: SHIPPED | OUTPATIENT
Start: 2024-02-27 | End: 2024-02-27 | Stop reason: SDUPTHER

## 2024-02-27 ASSESSMENT — PATIENT HEALTH QUESTIONNAIRE - PHQ9
2. FEELING DOWN, DEPRESSED OR HOPELESS: NOT AT ALL
SUM OF ALL RESPONSES TO PHQ9 QUESTIONS 1 AND 2: 0
1. LITTLE INTEREST OR PLEASURE IN DOING THINGS: NOT AT ALL

## 2024-02-27 ASSESSMENT — ENCOUNTER SYMPTOMS
SPEECH DIFFICULTY: 1
CARDIOVASCULAR NEGATIVE: 1
RESPIRATORY NEGATIVE: 1
CONSTITUTIONAL NEGATIVE: 1
GASTROINTESTINAL NEGATIVE: 1

## 2024-02-27 NOTE — ASSESSMENT & PLAN NOTE
-pt requires all the screening modalities, but she was feeling very overwhelmed today so it should be ordered during her next visit  -ALL ARE PENDING   Mammogram  DEXA  Colonoscopy  Low dose CT chest screening  -FU in 6 weeks

## 2024-02-27 NOTE — PROGRESS NOTES
Subjective   Patient ID: Zo Nunez is a 69 y.o. female who presents for Follow-up and Med Refill.  Med Refill      PMHX of L sided thalamic infarct Stroke 12/30/23 [mild R sided weakness, mild aphasia, on eliquis], paroxysmal Afib [eliquis], HTN    Pt had a stroke recently and was in a nursing facility after discharge. She has now been Home since 2-3 weeks  Is now eating regular diet, tolerating it.  Pt is currently receiving physical and occupational therapy comes home x1/week. But no speech therapy is ongoing, pt is unsure why.     Pt c/o of B/L hearing difficulty unable to clarify the onset of her problem, but denies dizziness, falls, ringing in her ears. She has hx of increased was in her ears.       Review of Systems   Constitutional: Negative.    HENT:  Positive for hearing loss.    Respiratory: Negative.     Cardiovascular: Negative.    Gastrointestinal: Negative.    Neurological:  Positive for speech difficulty.       Objective   Physical Exam  Constitutional:       Appearance: Normal appearance.   HENT:      Head: Normocephalic and atraumatic.      Mouth/Throat:      Mouth: Mucous membranes are moist.   Eyes:      Pupils: Pupils are equal, round, and reactive to light.   Cardiovascular:      Rate and Rhythm: Normal rate and regular rhythm.      Pulses: Normal pulses.      Heart sounds: Normal heart sounds.   Pulmonary:      Effort: Pulmonary effort is normal. No respiratory distress.      Breath sounds: Normal breath sounds.   Abdominal:      General: Abdomen is flat. Bowel sounds are normal.      Palpations: Abdomen is soft.   Musculoskeletal:         General: No swelling or tenderness. Normal range of motion.      Cervical back: Neck supple.   Skin:     General: Skin is warm and dry.   Neurological:      Mental Status: She is alert and oriented to person, place, and time.      Comments: Aphasia-slurred speech that is improving according to family/patient.  R sided strength of UL/LL 4/5    Psychiatric:         Mood and Affect: Mood normal.       Visit Vitals  /80        Assessment/Plan   Problem List Items Addressed This Visit       Cerebrovascular accident (CVA), unspecified mechanism (CMS/HCC)     -pt is not receiving speech therapy, advised to tell the home therapy company to start speech therapy, if any issues please call dr. Mclean for referral if required.          Relevant Medications    apixaban (Eliquis) 5 mg tablet    Essential hypertension     -pt has had multiple episodes of HTN emergency in the past and recently had a stroke.  -pt was advised to check her BP 2/day and keep a log  -pt instructed to bring this diary with her in 6 weeks during the clinic appointment           Relevant Medications    amLODIPine (Norvasc) 10 mg tablet    carvedilol (Coreg) 25 mg tablet    lisinopril 40 mg tablet    NIFEdipine CC 30 mg 24 hr tablet    spironolacton-hydrochlorothiaz (Aldactazide) 25-25 mg tablet    hydrALAZINE (Apresoline) 10 mg tablet    Hypothyroidism - Primary    Relevant Medications    levothyroxine (Synthroid, Levoxyl) 25 mcg tablet    GERD (gastroesophageal reflux disease)    Relevant Medications    omeprazole (PriLOSEC) 40 mg DR capsule    Longstanding persistent atrial fibrillation (CMS/HCC)    Relevant Medications    amLODIPine (Norvasc) 10 mg tablet    atorvastatin (Lipitor) 80 mg tablet    carvedilol (Coreg) 25 mg tablet    NIFEdipine CC 30 mg 24 hr tablet    Healthcare maintenance     -pt requires all the screening modalities, but she was feeling very overwhelmed today so it should be ordered during her next visit  -ALL ARE PENDING   Mammogram  DEXA  Colonoscopy  Low dose CT chest screening  -FU in 6 weeks         Asthma    Relevant Medications    albuterol 90 mcg/actuation inhaler    fluticasone (Flonase) 50 mcg/actuation nasal spray    montelukast (Singulair) 10 mg tablet    Vitamin B12 deficiency    Relevant Orders    Vitamin B12    Vitamin D deficiency    Relevant  Medications    ergocalciferol (Vitamin D-2) 1.25 MG (75260 UT) capsule    Other Relevant Orders    Vitamin D 25-Hydroxy,Total (for eval of Vitamin D levels)     Other Visit Diagnoses       Bilateral impacted cerumen        Relevant Medications    carbamide peroxide (Debrox) 6.5 % otic solution

## 2024-02-27 NOTE — PATIENT INSTRUCTIONS
-Please check BP 2 times a day, keep a diary of it and bring that diary to the clinic during your visit.  -HOLD HYDRALAZINE for BP <140/80

## 2024-02-27 NOTE — ASSESSMENT & PLAN NOTE
-pt has had multiple episodes of HTN emergency in the past and recently had a stroke.  -pt was advised to check her BP 2/day and keep a log  -pt instructed to bring this diary with her in 6 weeks during the clinic appointment

## 2024-02-27 NOTE — ASSESSMENT & PLAN NOTE
-pt is not receiving speech therapy, advised to tell the home therapy company to start speech therapy, if any issues please call dr. Mclean for referral if required.

## 2024-02-27 NOTE — PROGRESS NOTES
I saw and evaluated the patient. I personally obtained the key and critical portions of the history and physical exam or was physically present for key and critical portions performed by the resident/fellow. I reviewed the resident/fellow's documentation and discussed the patient with the resident/fellow. I agree with the resident/fellow's medical decision making as documented in the note.  Very complicated patient with complicated medical history.  We spent more than 60 minutes reviewing her chart, medications, discussing with caregivers.  MDM  1) COMPLEXITY: MORE THAN 1 STABLE CHRONIC CONDITION ADDRESSED OR 1 ACUTE ILLNESS ADDRESSED   2)DATA: TESTS INTERPRETED AND OR ORDERED, TOOK INDEPENDENT HISTORY OR RECORDS REVIEWED  3)RISK: HIGH RISK DUE TO NATURE OF MEDICAL CONDITIONS/COMORBIDITY OR MEDICATIONS ORDERED OR SURGICAL OR PROCEDURE REFERRAL    Caridad Gonzalez MD

## 2024-03-13 ENCOUNTER — HOME VISIT (OUTPATIENT)
Dept: PRIMARY CARE | Facility: CLINIC | Age: 69
End: 2024-03-13
Payer: COMMERCIAL

## 2024-03-13 DIAGNOSIS — E03.9 HYPOTHYROIDISM, UNSPECIFIED TYPE: ICD-10-CM

## 2024-03-13 DIAGNOSIS — F10.21 ALCOHOL DEPENDENCE, IN REMISSION (MULTI): ICD-10-CM

## 2024-03-13 DIAGNOSIS — I10 ESSENTIAL HYPERTENSION: Primary | ICD-10-CM

## 2024-03-13 DIAGNOSIS — I63.9 CEREBROVASCULAR ACCIDENT (CVA), UNSPECIFIED MECHANISM (MULTI): ICD-10-CM

## 2024-03-13 DIAGNOSIS — I48.11 LONGSTANDING PERSISTENT ATRIAL FIBRILLATION (MULTI): ICD-10-CM

## 2024-03-13 PROCEDURE — 99375 HOME HEALTH CARE SUPERVISION: CPT | Performed by: INTERNAL MEDICINE

## 2024-03-13 NOTE — PROGRESS NOTES
More than 30 mins spent last month on reviewing and approving home care services for this patient.

## 2024-03-26 DIAGNOSIS — I10 ESSENTIAL HYPERTENSION: ICD-10-CM

## 2024-03-26 DIAGNOSIS — I48.11 LONGSTANDING PERSISTENT ATRIAL FIBRILLATION (MULTI): ICD-10-CM

## 2024-03-26 RX ORDER — CARVEDILOL 25 MG/1
25 TABLET ORAL 2 TIMES DAILY
Qty: 60 TABLET | Refills: 0 | Status: SHIPPED | OUTPATIENT
Start: 2024-03-26 | End: 2024-04-19 | Stop reason: SDUPTHER

## 2024-03-26 RX ORDER — ATORVASTATIN CALCIUM 80 MG/1
80 TABLET, FILM COATED ORAL NIGHTLY
Qty: 30 TABLET | Refills: 0 | Status: SHIPPED | OUTPATIENT
Start: 2024-03-26 | End: 2024-04-19

## 2024-04-19 ENCOUNTER — OFFICE VISIT (OUTPATIENT)
Dept: PRIMARY CARE | Facility: CLINIC | Age: 69
End: 2024-04-19
Payer: COMMERCIAL

## 2024-04-19 VITALS — BODY MASS INDEX: 27.46 KG/M2 | DIASTOLIC BLOOD PRESSURE: 60 MMHG | WEIGHT: 160 LBS | SYSTOLIC BLOOD PRESSURE: 120 MMHG

## 2024-04-19 DIAGNOSIS — I48.11 LONGSTANDING PERSISTENT ATRIAL FIBRILLATION (MULTI): ICD-10-CM

## 2024-04-19 DIAGNOSIS — H61.23 BILATERAL HEARING LOSS DUE TO CERUMEN IMPACTION: ICD-10-CM

## 2024-04-19 DIAGNOSIS — Z00.00 ANNUAL PHYSICAL EXAM: ICD-10-CM

## 2024-04-19 DIAGNOSIS — K21.00 GASTROESOPHAGEAL REFLUX DISEASE WITH ESOPHAGITIS WITHOUT HEMORRHAGE: ICD-10-CM

## 2024-04-19 DIAGNOSIS — G62.89 OTHER POLYNEUROPATHY: Primary | ICD-10-CM

## 2024-04-19 DIAGNOSIS — F41.9 ANXIETY: ICD-10-CM

## 2024-04-19 DIAGNOSIS — I10 ESSENTIAL HYPERTENSION: ICD-10-CM

## 2024-04-19 DIAGNOSIS — E03.9 HYPOTHYROIDISM, UNSPECIFIED TYPE: ICD-10-CM

## 2024-04-19 PROCEDURE — 1123F ACP DISCUSS/DSCN MKR DOCD: CPT | Performed by: INTERNAL MEDICINE

## 2024-04-19 PROCEDURE — 99214 OFFICE O/P EST MOD 30 MIN: CPT | Performed by: INTERNAL MEDICINE

## 2024-04-19 PROCEDURE — 3078F DIAST BP <80 MM HG: CPT | Performed by: INTERNAL MEDICINE

## 2024-04-19 PROCEDURE — 1159F MED LIST DOCD IN RCRD: CPT | Performed by: INTERNAL MEDICINE

## 2024-04-19 PROCEDURE — 3074F SYST BP LT 130 MM HG: CPT | Performed by: INTERNAL MEDICINE

## 2024-04-19 PROCEDURE — 69210 REMOVE IMPACTED EAR WAX UNI: CPT | Performed by: INTERNAL MEDICINE

## 2024-04-19 PROCEDURE — 1157F ADVNC CARE PLAN IN RCRD: CPT | Performed by: INTERNAL MEDICINE

## 2024-04-19 RX ORDER — LEVOTHYROXINE SODIUM 25 UG/1
25 TABLET ORAL
Qty: 90 TABLET | Refills: 1 | Status: SHIPPED | OUTPATIENT
Start: 2024-04-19 | End: 2024-10-16

## 2024-04-19 RX ORDER — ATORVASTATIN CALCIUM 80 MG/1
80 TABLET, FILM COATED ORAL NIGHTLY
Qty: 30 TABLET | Refills: 0 | Status: SHIPPED | OUTPATIENT
Start: 2024-04-19 | End: 2024-04-19 | Stop reason: SDUPTHER

## 2024-04-19 RX ORDER — AMLODIPINE BESYLATE 10 MG/1
10 TABLET ORAL DAILY
Qty: 90 TABLET | Refills: 0 | Status: SHIPPED | OUTPATIENT
Start: 2024-04-19 | End: 2024-04-22

## 2024-04-19 RX ORDER — LISINOPRIL 40 MG/1
40 TABLET ORAL DAILY
Qty: 90 TABLET | Refills: 1 | Status: SHIPPED | OUTPATIENT
Start: 2024-04-19 | End: 2024-04-22

## 2024-04-19 RX ORDER — ATORVASTATIN CALCIUM 80 MG/1
80 TABLET, FILM COATED ORAL NIGHTLY
Qty: 90 TABLET | Refills: 1 | Status: SHIPPED | OUTPATIENT
Start: 2024-04-19 | End: 2024-10-16

## 2024-04-19 RX ORDER — CARVEDILOL 25 MG/1
25 TABLET ORAL 2 TIMES DAILY
Qty: 180 TABLET | Refills: 0 | Status: SHIPPED | OUTPATIENT
Start: 2024-04-19 | End: 2024-04-21

## 2024-04-19 RX ORDER — HYDRALAZINE HYDROCHLORIDE 10 MG/1
10 TABLET, FILM COATED ORAL 2 TIMES DAILY
Qty: 180 TABLET | Refills: 1 | Status: SHIPPED | OUTPATIENT
Start: 2024-04-19 | End: 2024-10-16

## 2024-04-19 RX ORDER — NIFEDIPINE 30 MG/1
30 TABLET, FILM COATED, EXTENDED RELEASE ORAL DAILY
Qty: 90 TABLET | Refills: 1 | Status: SHIPPED | OUTPATIENT
Start: 2024-04-19 | End: 2024-10-16

## 2024-04-19 RX ORDER — OMEPRAZOLE 40 MG/1
40 CAPSULE, DELAYED RELEASE ORAL
Qty: 90 CAPSULE | Refills: 1 | Status: SHIPPED | OUTPATIENT
Start: 2024-04-19 | End: 2024-10-16

## 2024-04-19 NOTE — PROGRESS NOTES
Chief Complaint:   Chief Complaint   Patient presents with    Follow-up      HPI    Zo Nunez is a 69 y.o. year old female who presents to the clinic for follow up    Past Medical History   Past Medical History:   Diagnosis Date    Acute upper respiratory infection, unspecified     Acute upper respiratory infection    Alcohol dependence, in remission (Multi) 12/26/2019    History of alcoholism    Alcohol dependence, in remission (Multi) 12/26/2019    History of alcohol dependence    Constipation, unspecified 12/26/2019    Constipation in female    Essential (primary) hypertension 12/26/2019    HTN (hypertension), benign    Other malaise 12/26/2019    Physical deconditioning    Personal history of other diseases of the circulatory system 12/26/2019    History of atrial fibrillation    Personal history of other diseases of the musculoskeletal system and connective tissue 12/26/2019    History of rhabdomyolysis    Personal history of other specified conditions 12/26/2019    History of weakness    Pressure ulcer of sacral region, stage 2 (Multi) 12/26/2019    Pressure injury of sacral region, stage 2    Trichomonal vulvovaginitis     Trichomonal vulvovaginitis      Past Surgical History:   Past Surgical History:   Procedure Laterality Date    OTHER SURGICAL HISTORY  12/26/2019    Decubitus ulcer debridement     Family History:   No family history on file.  Social History:   Tobacco Use: Low Risk  (1/3/2024)    Patient History     Smoking Tobacco Use: Never     Smokeless Tobacco Use: Never     Passive Exposure: Not on file      Social History     Substance and Sexual Activity   Alcohol Use Not Currently        Allergies:   Allergies   Allergen Reactions    Amiloride-Hydrochlorothiazide Unknown     pt states that she cannot tolerate  hctz by itself ; pt states that she does take a combination med with hctz and tolerates that fine.spasms in legs, neck and back    Milk GI Upset    Hydrochlorothiazide Unknown        ROS  "  Review of Systems     Objective   Vitals:    04/19/24 1534   BP: 120/60      BMI Readings from Last 15 Encounters:   04/19/24 27.46 kg/m²   02/27/24 26.61 kg/m²   01/24/24 24.89 kg/m²   01/17/24 24.89 kg/m²   01/10/24 25.23 kg/m²   01/06/24 28.06 kg/m²   12/30/23 27.96 kg/m²   06/21/22 28.48 kg/m²   05/06/21 33.91 kg/m²   09/21/20 31.62 kg/m²   09/16/20 26.78 kg/m²   07/24/20 31.05 kg/m²   06/17/20 31.24 kg/m²   06/05/20 26.18 kg/m²   04/08/20 27.12 kg/m²      72.6 kg (160 lb) (4/19/2024  3:34 PM)      Physical Exam  Physical Exam     Labs:  CBC:  Recent Labs     01/09/24  0403 01/08/24  0454 01/06/24  1459   WBC 3.8* 4.4 9.7   HGB 12.1 12.4 13.2   HCT 42.0 37.2 41.2    243 229   MCV 97 86 91     CMP:  Recent Labs     01/09/24  0403 01/08/24  1428 01/08/24  0454 01/07/24  0422     --  138 136   K 3.5 3.3* 2.6* 3.6   *  --  102 108*   CO2 24  --  23 16*   ANIONGAP 12  --  16 16   BUN 13  --  11 14   CREATININE 0.84  --  0.72 0.80   EGFR 75  --  >90 80   GLUCOSE 107*  --  75 80     Recent Labs     01/06/24  1459 01/04/24  0600 12/30/23  1034 12/07/22  0301 12/06/22  1408 11/28/19  0437 11/27/19  1330   ALBUMIN 3.6 3.2* 3.6   < > 5.0   < > 4.2   ALKPHOS 97 95 98   < > 137*   < > 241*   ALT 11 11 28   < > 29   < > 202*   AST 18 16 24   < > 16   < > 1,000*   BILITOT 0.5 0.7 0.5   < > 0.5   < > 1.3*   LIPASE  --   --   --   --  3*  --  9    < > = values in this interval not displayed.     Calcium/Phos:   Lab Results   Component Value Date    CALCIUM 8.7 01/09/2024    PHOS 3.7 12/08/2022      COAG:   Recent Labs     01/06/24  1459 12/30/23  1034 12/06/22  1408   INR 1.1 1.0 1.0     CRP: No results found for: \"CRP\"   [unfilled]   ENDO:  Recent Labs     12/30/23  1034 03/23/23  0608 06/21/22  1110 03/11/22  0645 06/03/20  0832 01/20/20  0845 12/02/19  1553   TSH  --  1.53 2.73  --  1.95 2.44 8.68*   HGBA1C 4.8  --  5.3 5.8*  --   --  5.1      CARDIAC:   Recent Labs     12/30/23  1808 12/30/23  1034 " 04/23/23  1800 04/23/23  0540 04/23/23  0450 07/12/22  1956 12/04/19  0612   TROPHS  --  23* CANCELED 15* 14*   < >  --    *  --   --   --  187*  --  133*    < > = values in this interval not displayed.     Recent Labs     12/30/23  1034 06/21/22  1110 01/20/20  0845   CHOL 153 182 142   LDLF  --  64 56   LDLCALC 62  --   --    HDL 79.3 96.9 73.0   TRIG 59 104 63     No data recorded    Current Medications:  Current Outpatient Medications   Medication Sig Dispense Refill    albuterol 90 mcg/actuation inhaler Inhale 1 puff every 6 hours if needed for wheezing. 18 g 1    amLODIPine (Norvasc) 10 mg tablet Take 1 tablet (10 mg) by mouth once daily. 90 tablet 0    apixaban (Eliquis) 5 mg tablet Take 1 tablet (5 mg) by mouth every 12 hours. 180 tablet 0    atorvastatin (Lipitor) 80 mg tablet Take 1 tablet (80 mg) by mouth once daily at bedtime. 90 tablet 1    carvedilol (Coreg) 25 mg tablet Take 1 tablet (25 mg) by mouth 2 times a day. 180 tablet 0    cyanocobalamin (Vitamin B-12) 1,000 mcg/mL injection Inject 1 mL (1,000 mcg) into the shoulder, thigh, or buttocks every 30 (thirty) days.      cyclobenzaprine (Flexeril) 5 mg tablet TAKE 1 TABLET BY MOUTH ONE TO TWO TIMES DAILY AS NEEDED      diphenhydrAMINE (Benadryl Allergy) 25 mg tablet Take by mouth every 4 hours.      ergocalciferol (Vitamin D-2) 1.25 MG (19071 UT) capsule Take 1 capsule (1,250 mcg) by mouth 1 (one) time per week. 4 capsule 1    fluticasone (Flonase) 50 mcg/actuation nasal spray Administer 1 spray into each nostril once daily. 16 g 1    hydrALAZINE (Apresoline) 10 mg tablet Take 1 tablet (10 mg) by mouth 2 times a day. 180 tablet 1    LACTULOSE ORAL Take by mouth.      levothyroxine (Synthroid, Levoxyl) 25 mcg tablet Take 1 tablet (25 mcg) by mouth once daily in the morning. Take before meals. 90 tablet 1    lisinopril 40 mg tablet Take 1 tablet (40 mg) by mouth once daily. 90 tablet 1    montelukast (Singulair) 10 mg tablet Take 1 tablet (10  mg) by mouth once daily at bedtime. 30 tablet 1    NIFEdipine ER (NIFEdipine CC) 30 mg 24 hr tablet Take 1 tablet (30 mg) by mouth once daily. 90 tablet 1    omeprazole (PriLOSEC) 40 mg DR capsule Take 1 capsule (40 mg) by mouth once daily in the morning. Take before meals. 90 capsule 1    spironolacton-hydrochlorothiaz (Aldactazide) 25-25 mg tablet Take 1 tablet (25 mg) by mouth once daily. 30 tablet 1     No current facility-administered medications for this visit.       Assessment and Plan  Zo was seen today for follow-up.  Diagnoses and all orders for this visit:  Other polyneuropathy (Primary)  -     CBC; Future  -     Comprehensive Metabolic Panel; Future  -     TSH with reflex to Free T4 if abnormal; Future  -     Vitamin D 25-Hydroxy,Total (for eval of Vitamin D levels); Future  -     Vitamin B12; Future  -     Folate; Future  -     Hemoglobin A1C; Future  Anxiety  -     CBC; Future  -     Comprehensive Metabolic Panel; Future  -     TSH with reflex to Free T4 if abnormal; Future  -     Vitamin D 25-Hydroxy,Total (for eval of Vitamin D levels); Future  -     Vitamin B12; Future  -     Folate; Future  -     Hemoglobin A1C; Future  Hypothyroidism, unspecified type  -     CBC; Future  -     Comprehensive Metabolic Panel; Future  -     TSH with reflex to Free T4 if abnormal; Future  -     Vitamin D 25-Hydroxy,Total (for eval of Vitamin D levels); Future  -     Vitamin B12; Future  -     Folate; Future  -     Hemoglobin A1C; Future  -     levothyroxine (Synthroid, Levoxyl) 25 mcg tablet; Take 1 tablet (25 mcg) by mouth once daily in the morning. Take before meals.  Essential hypertension  -     CBC; Future  -     Comprehensive Metabolic Panel; Future  -     TSH with reflex to Free T4 if abnormal; Future  -     Vitamin D 25-Hydroxy,Total (for eval of Vitamin D levels); Future  -     Vitamin B12; Future  -     Folate; Future  -     Hemoglobin A1C; Future  -     amLODIPine (Norvasc) 10 mg tablet; Take 1 tablet  (10 mg) by mouth once daily.  -     carvedilol (Coreg) 25 mg tablet; Take 1 tablet (25 mg) by mouth 2 times a day.  -     hydrALAZINE (Apresoline) 10 mg tablet; Take 1 tablet (10 mg) by mouth 2 times a day.  -     lisinopril 40 mg tablet; Take 1 tablet (40 mg) by mouth once daily.  -     NIFEdipine ER (NIFEdipine CC) 30 mg 24 hr tablet; Take 1 tablet (30 mg) by mouth once daily.  Gastroesophageal reflux disease with esophagitis without hemorrhage  -     omeprazole (PriLOSEC) 40 mg DR capsule; Take 1 capsule (40 mg) by mouth once daily in the morning. Take before meals.  Longstanding persistent atrial fibrillation (Multi)  -     atorvastatin (Lipitor) 80 mg tablet; Take 1 tablet (80 mg) by mouth once daily at bedtime.  Annual physical exam  -     XR DEXA bone density; Future  -     Cologuard® colon cancer screening; Future  -     BI mammo bilateral screening tomosynthesis; Future  -     Cologuard® colon cancer screening     Meds discussed in details with the daughter  Updated meds and instructions given  Speech is improving  Comprehension hasn't improved yet  She is working on it  Has bilateral cerumen  Both ears cleaned  Hearing much better  Recommend repeat BW  Due for screening tests  Mammogram ordered  Cologuard preferred  DEXA ordered    Immunizations:  Immunization History   Administered Date(s) Administered    Influenza, seasonal, injectable 10/13/2016, 09/15/2017    Moderna SARS-CoV-2 Vaccination 11/06/2021, 12/12/2021, 04/20/2022    Pneumococcal conjugate vaccine, 13-valent (PREVNAR 13) 01/17/2020    Pneumococcal polysaccharide vaccine, 23-valent, age 2 years and older (PNEUMOVAX 23) 09/15/2017    Tdap vaccine, age 7 year and older (BOOSTRIX, ADACEL) 05/06/2011, 11/27/2019    Tetanus toxoid, adsorbed 04/08/2005    Zoster vaccine, recombinant, adult (SHINGRIX) 01/17/2020     Please follow up in

## 2024-04-21 DIAGNOSIS — I10 ESSENTIAL HYPERTENSION: ICD-10-CM

## 2024-04-21 RX ORDER — CARVEDILOL 25 MG/1
25 TABLET ORAL 2 TIMES DAILY
Qty: 60 TABLET | Refills: 3 | Status: SHIPPED | OUTPATIENT
Start: 2024-04-21

## 2024-04-22 ENCOUNTER — LAB (OUTPATIENT)
Dept: LAB | Facility: LAB | Age: 69
End: 2024-04-22
Payer: COMMERCIAL

## 2024-04-22 DIAGNOSIS — F41.9 ANXIETY: ICD-10-CM

## 2024-04-22 DIAGNOSIS — J45.20 MILD INTERMITTENT ASTHMA WITHOUT COMPLICATION (HHS-HCC): ICD-10-CM

## 2024-04-22 DIAGNOSIS — I10 ESSENTIAL HYPERTENSION: ICD-10-CM

## 2024-04-22 DIAGNOSIS — E03.9 HYPOTHYROIDISM, UNSPECIFIED TYPE: ICD-10-CM

## 2024-04-22 DIAGNOSIS — G62.89 OTHER POLYNEUROPATHY: ICD-10-CM

## 2024-04-22 LAB
25(OH)D3 SERPL-MCNC: 36 NG/ML (ref 30–100)
ALBUMIN SERPL BCP-MCNC: 3.9 G/DL (ref 3.4–5)
ALP SERPL-CCNC: 125 U/L (ref 33–136)
ALT SERPL W P-5'-P-CCNC: 133 U/L (ref 7–45)
ANION GAP SERPL CALC-SCNC: 11 MMOL/L (ref 10–20)
AST SERPL W P-5'-P-CCNC: 84 U/L (ref 9–39)
BILIRUB SERPL-MCNC: 0.5 MG/DL (ref 0–1.2)
BUN SERPL-MCNC: 54 MG/DL (ref 6–23)
CALCIUM SERPL-MCNC: 8.8 MG/DL (ref 8.6–10.6)
CHLORIDE SERPL-SCNC: 112 MMOL/L (ref 98–107)
CO2 SERPL-SCNC: 22 MMOL/L (ref 21–32)
CREAT SERPL-MCNC: 1.79 MG/DL (ref 0.5–1.05)
EGFRCR SERPLBLD CKD-EPI 2021: 30 ML/MIN/1.73M*2
ERYTHROCYTE [DISTWIDTH] IN BLOOD BY AUTOMATED COUNT: 16.4 % (ref 11.5–14.5)
EST. AVERAGE GLUCOSE BLD GHB EST-MCNC: 120 MG/DL
FOLATE SERPL-MCNC: 16.4 NG/ML
GLUCOSE SERPL-MCNC: 103 MG/DL (ref 74–99)
HBA1C MFR BLD: 5.8 %
HCT VFR BLD AUTO: 37.7 % (ref 36–46)
HGB BLD-MCNC: 11.3 G/DL (ref 12–16)
MCH RBC QN AUTO: 26.9 PG (ref 26–34)
MCHC RBC AUTO-ENTMCNC: 30 G/DL (ref 32–36)
MCV RBC AUTO: 90 FL (ref 80–100)
NRBC BLD-RTO: 0 /100 WBCS (ref 0–0)
PLATELET # BLD AUTO: 213 X10*3/UL (ref 150–450)
POTASSIUM SERPL-SCNC: 4.3 MMOL/L (ref 3.5–5.3)
PROT SERPL-MCNC: 6.1 G/DL (ref 6.4–8.2)
RBC # BLD AUTO: 4.2 X10*6/UL (ref 4–5.2)
SODIUM SERPL-SCNC: 141 MMOL/L (ref 136–145)
TSH SERPL-ACNC: 3.35 MIU/L (ref 0.44–3.98)
VIT B12 SERPL-MCNC: 388 PG/ML (ref 211–911)
WBC # BLD AUTO: 7.1 X10*3/UL (ref 4.4–11.3)

## 2024-04-22 PROCEDURE — 82306 VITAMIN D 25 HYDROXY: CPT

## 2024-04-22 PROCEDURE — 85027 COMPLETE CBC AUTOMATED: CPT

## 2024-04-22 PROCEDURE — 82607 VITAMIN B-12: CPT

## 2024-04-22 PROCEDURE — 84443 ASSAY THYROID STIM HORMONE: CPT

## 2024-04-22 PROCEDURE — 82746 ASSAY OF FOLIC ACID SERUM: CPT

## 2024-04-22 PROCEDURE — 80053 COMPREHEN METABOLIC PANEL: CPT

## 2024-04-22 PROCEDURE — 83036 HEMOGLOBIN GLYCOSYLATED A1C: CPT

## 2024-04-22 RX ORDER — MONTELUKAST SODIUM 10 MG/1
10 TABLET ORAL NIGHTLY
Qty: 30 TABLET | Refills: 1 | Status: SHIPPED | OUTPATIENT
Start: 2024-04-22 | End: 2024-06-21

## 2024-04-22 RX ORDER — LISINOPRIL 40 MG/1
40 TABLET ORAL DAILY
Qty: 30 TABLET | Refills: 1 | Status: SHIPPED | OUTPATIENT
Start: 2024-04-22

## 2024-04-22 RX ORDER — SPIRONOLACTONE AND HYDROCHLOROTHIAZIDE 25; 25 MG/1; MG/1
1 TABLET ORAL DAILY
Qty: 30 TABLET | Refills: 1 | Status: SHIPPED | OUTPATIENT
Start: 2024-04-22

## 2024-04-22 RX ORDER — AMLODIPINE BESYLATE 10 MG/1
10 TABLET ORAL DAILY
Qty: 30 TABLET | Refills: 1 | Status: SHIPPED | OUTPATIENT
Start: 2024-04-22

## 2024-04-24 ENCOUNTER — HOSPITAL ENCOUNTER (OUTPATIENT)
Dept: RADIOLOGY | Facility: CLINIC | Age: 69
Discharge: HOME | End: 2024-04-24
Payer: COMMERCIAL

## 2024-04-24 ENCOUNTER — TELEMEDICINE (OUTPATIENT)
Dept: PRIMARY CARE | Facility: CLINIC | Age: 69
End: 2024-04-24
Payer: COMMERCIAL

## 2024-04-24 DIAGNOSIS — Z00.00 ANNUAL PHYSICAL EXAM: ICD-10-CM

## 2024-04-24 DIAGNOSIS — E86.0 DEHYDRATION: Primary | ICD-10-CM

## 2024-04-24 PROCEDURE — 1157F ADVNC CARE PLAN IN RCRD: CPT | Performed by: INTERNAL MEDICINE

## 2024-04-24 PROCEDURE — 1159F MED LIST DOCD IN RCRD: CPT | Performed by: INTERNAL MEDICINE

## 2024-04-24 PROCEDURE — 99442 PR PHYS/QHP TELEPHONE EVALUATION 11-20 MIN: CPT | Performed by: INTERNAL MEDICINE

## 2024-04-24 PROCEDURE — 1123F ACP DISCUSS/DSCN MKR DOCD: CPT | Performed by: INTERNAL MEDICINE

## 2024-04-24 PROCEDURE — 77080 DXA BONE DENSITY AXIAL: CPT

## 2024-04-24 NOTE — PROGRESS NOTES
Chief Complaint:   Chief Complaint   Patient presents with    Follow-up      HPI    Zo Nunez is a 69 y.o. year old female who presents to the clinic for lab results follow up.    Past Medical History   Past Medical History:   Diagnosis Date    Acute upper respiratory infection, unspecified     Acute upper respiratory infection    Alcohol dependence, in remission (Multi) 12/26/2019    History of alcoholism    Alcohol dependence, in remission (Multi) 12/26/2019    History of alcohol dependence    Constipation, unspecified 12/26/2019    Constipation in female    Essential (primary) hypertension 12/26/2019    HTN (hypertension), benign    Other malaise 12/26/2019    Physical deconditioning    Personal history of other diseases of the circulatory system 12/26/2019    History of atrial fibrillation    Personal history of other diseases of the musculoskeletal system and connective tissue 12/26/2019    History of rhabdomyolysis    Personal history of other specified conditions 12/26/2019    History of weakness    Pressure ulcer of sacral region, stage 2 (Multi) 12/26/2019    Pressure injury of sacral region, stage 2    Trichomonal vulvovaginitis     Trichomonal vulvovaginitis      Past Surgical History:   Past Surgical History:   Procedure Laterality Date    OTHER SURGICAL HISTORY  12/26/2019    Decubitus ulcer debridement     Family History:   No family history on file.  Social History:   Tobacco Use: Low Risk  (1/3/2024)    Patient History     Smoking Tobacco Use: Never     Smokeless Tobacco Use: Never     Passive Exposure: Not on file      Social History     Substance and Sexual Activity   Alcohol Use Not Currently        Allergies:   Allergies   Allergen Reactions    Amiloride-Hydrochlorothiazide Unknown     pt states that she cannot tolerate  hctz by itself ; pt states that she does take a combination med with hctz and tolerates that fine.spasms in legs, neck and back    Milk GI Upset    Hydrochlorothiazide  "Unknown        ROS   Review of Systems     Objective   There were no vitals filed for this visit.   BMI Readings from Last 15 Encounters:   04/19/24 27.46 kg/m²   02/27/24 26.61 kg/m²   01/24/24 24.89 kg/m²   01/17/24 24.89 kg/m²   01/10/24 25.23 kg/m²   01/06/24 28.06 kg/m²   12/30/23 27.96 kg/m²   06/21/22 28.48 kg/m²   05/06/21 33.91 kg/m²   09/21/20 31.62 kg/m²   09/16/20 26.78 kg/m²   07/24/20 31.05 kg/m²   06/17/20 31.24 kg/m²   06/05/20 26.18 kg/m²   04/08/20 27.12 kg/m²      72.6 kg (160 lb) (4/19/2024  3:34 PM)      Physical Exam  Physical Exam     Labs:  CBC:  Recent Labs     04/22/24  0845 01/09/24  0403 01/08/24  0454   WBC 7.1 3.8* 4.4   HGB 11.3* 12.1 12.4   HCT 37.7 42.0 37.2    161 243   MCV 90 97 86     CMP:  Recent Labs     04/22/24  0845 01/09/24  0403 01/08/24  1428 01/08/24  0454    140  --  138   K 4.3 3.5 3.3* 2.6*   * 108*  --  102   CO2 22 24  --  23   ANIONGAP 11 12  --  16   BUN 54* 13  --  11   CREATININE 1.79* 0.84  --  0.72   EGFR 30* 75  --  >90   GLUCOSE 103* 107*  --  75     Recent Labs     04/22/24  0845 01/06/24  1459 01/04/24  0600 12/07/22  0301 12/06/22  1408 11/28/19  0437 11/27/19  1330   ALBUMIN 3.9 3.6 3.2*   < > 5.0   < > 4.2   ALKPHOS 125 97 95   < > 137*   < > 241*   * 11 11   < > 29   < > 202*   AST 84* 18 16   < > 16   < > 1,000*   BILITOT 0.5 0.5 0.7   < > 0.5   < > 1.3*   LIPASE  --   --   --   --  3*  --  9    < > = values in this interval not displayed.     Calcium/Phos:   Lab Results   Component Value Date    CALCIUM 8.8 04/22/2024    PHOS 3.7 12/08/2022      COAG:   Recent Labs     01/06/24  1459 12/30/23  1034 12/06/22  1408   INR 1.1 1.0 1.0     CRP: No results found for: \"CRP\"   [unfilled]   ENDO:  Recent Labs     04/22/24  0845 12/30/23  1034 03/23/23  0608 06/21/22  1110 03/11/22  0645 06/03/20  0832   TSH 3.35  --  1.53 2.73  --  1.95   HGBA1C 5.8* 4.8  --  5.3 5.8*  --       CARDIAC:   Recent Labs     12/30/23  1808 " 12/30/23  1034 04/23/23  1800 04/23/23  0540 04/23/23  0450 07/12/22  1956 12/04/19  0612   TROPHS  --  23* CANCELED 15* 14*   < >  --    *  --   --   --  187*  --  133*    < > = values in this interval not displayed.     Recent Labs     12/30/23  1034 06/21/22  1110 01/20/20  0845   CHOL 153 182 142   LDLF  --  64 56   LDLCALC 62  --   --    HDL 79.3 96.9 73.0   TRIG 59 104 63     No data recorded    Current Medications:  Current Outpatient Medications   Medication Sig Dispense Refill    albuterol 90 mcg/actuation inhaler Inhale 1 puff every 6 hours if needed for wheezing. 18 g 1    amLODIPine (Norvasc) 10 mg tablet TAKE 1 TABLET BY MOUTH EVERY DAY 30 tablet 1    apixaban (Eliquis) 5 mg tablet Take 1 tablet (5 mg) by mouth every 12 hours. 180 tablet 0    atorvastatin (Lipitor) 80 mg tablet Take 1 tablet (80 mg) by mouth once daily at bedtime. 90 tablet 1    carvedilol (Coreg) 25 mg tablet TAKE 1 TABLET BY MOUTH TWICE A DAY 60 tablet 3    cyanocobalamin (Vitamin B-12) 1,000 mcg/mL injection Inject 1 mL (1,000 mcg) into the shoulder, thigh, or buttocks every 30 (thirty) days.      cyclobenzaprine (Flexeril) 5 mg tablet TAKE 1 TABLET BY MOUTH ONE TO TWO TIMES DAILY AS NEEDED      diphenhydrAMINE (Benadryl Allergy) 25 mg tablet Take by mouth every 4 hours.      ergocalciferol (Vitamin D-2) 1.25 MG (86683 UT) capsule Take 1 capsule (1,250 mcg) by mouth 1 (one) time per week. 4 capsule 1    fluticasone (Flonase) 50 mcg/actuation nasal spray Administer 1 spray into each nostril once daily. 16 g 1    hydrALAZINE (Apresoline) 10 mg tablet Take 1 tablet (10 mg) by mouth 2 times a day. 180 tablet 1    LACTULOSE ORAL Take by mouth.      levothyroxine (Synthroid, Levoxyl) 25 mcg tablet Take 1 tablet (25 mcg) by mouth once daily in the morning. Take before meals. 90 tablet 1    lisinopril 40 mg tablet TAKE 1 TABLET BY MOUTH EVERY DAY 30 tablet 1    montelukast (Singulair) 10 mg tablet TAKE 1 TABLET (10 MG) BY MOUTH ONCE  DAILY AT BEDTIME. 30 tablet 1    NIFEdipine ER (NIFEdipine CC) 30 mg 24 hr tablet Take 1 tablet (30 mg) by mouth once daily. 90 tablet 1    omeprazole (PriLOSEC) 40 mg DR capsule Take 1 capsule (40 mg) by mouth once daily in the morning. Take before meals. 90 capsule 1    spironolacton-hydrochlorothiaz (Aldactazide) 25-25 mg tablet TAKE 1 TABLET BY MOUTH EVERY DAY 30 tablet 1     No current facility-administered medications for this visit.       Assessment and Plan  Zo was seen today for follow-up.  Diagnoses and all orders for this visit:  Dehydration (Primary)  -     Comprehensive metabolic panel; Future       Discussed BW results in details with the daughter    NICHOLE  Stop Spironolactone/hydrochlorothiazide  Drink more fluids  Repeat CMP in 10 days    High LFTs  Stop Atorvastatin  Repeat CMP in 10 days    Low B12  Schedule monthly injections      Immunizations:  Immunization History   Administered Date(s) Administered    Influenza, seasonal, injectable 10/13/2016, 09/15/2017    Moderna SARS-CoV-2 Vaccination 11/06/2021, 12/12/2021, 04/20/2022    Pneumococcal conjugate vaccine, 13-valent (PREVNAR 13) 01/17/2020    Pneumococcal polysaccharide vaccine, 23-valent, age 2 years and older (PNEUMOVAX 23) 09/15/2017    Tdap vaccine, age 7 year and older (BOOSTRIX, ADACEL) 05/06/2011, 11/27/2019    Tetanus toxoid, adsorbed 04/08/2005    Zoster vaccine, recombinant, adult (SHINGRIX) 01/17/2020   .  An interactive audio and video telecommunication system which permits real time communications between the patient (at the originating site) and provider (at the distant site) was utilized to provide this telehealth service.    Verbal consent was requested and obtained from the patient on the day of encounter.  This is a virtual visit using HIPAA compliant video platform. It required patient-provider interaction for the medical decision making as documented.     I have communicated my name and active licensure. The patient's  identity and physical location were verified at the time of this visit.   Either the patient or their legal representative has been informed of the risks and benefits of -- and alternatives to -- treatment through a remote evaluation and consents to proceed with the evaluation remotely.     Total time spent was 15 minutes for this encounter, including chart review, discussion with the patient and addressing their concerns.

## 2024-05-08 ENCOUNTER — LAB (OUTPATIENT)
Dept: LAB | Facility: LAB | Age: 69
End: 2024-05-08
Payer: COMMERCIAL

## 2024-05-08 DIAGNOSIS — E53.8 VITAMIN B12 DEFICIENCY: ICD-10-CM

## 2024-05-08 DIAGNOSIS — E86.0 DEHYDRATION: ICD-10-CM

## 2024-05-08 DIAGNOSIS — E55.9 VITAMIN D DEFICIENCY: ICD-10-CM

## 2024-05-08 LAB
25(OH)D3 SERPL-MCNC: 37 NG/ML (ref 30–100)
ALBUMIN SERPL BCP-MCNC: 4 G/DL (ref 3.4–5)
ALP SERPL-CCNC: 116 U/L (ref 33–136)
ALT SERPL W P-5'-P-CCNC: 60 U/L (ref 7–45)
ANION GAP SERPL CALC-SCNC: 13 MMOL/L (ref 10–20)
AST SERPL W P-5'-P-CCNC: 40 U/L (ref 9–39)
BILIRUB SERPL-MCNC: 0.4 MG/DL (ref 0–1.2)
BUN SERPL-MCNC: 27 MG/DL (ref 6–23)
CALCIUM SERPL-MCNC: 9.2 MG/DL (ref 8.6–10.6)
CHLORIDE SERPL-SCNC: 108 MMOL/L (ref 98–107)
CO2 SERPL-SCNC: 26 MMOL/L (ref 21–32)
CREAT SERPL-MCNC: 1.18 MG/DL (ref 0.5–1.05)
EGFRCR SERPLBLD CKD-EPI 2021: 50 ML/MIN/1.73M*2
GLUCOSE SERPL-MCNC: 85 MG/DL (ref 74–99)
POTASSIUM SERPL-SCNC: 4.4 MMOL/L (ref 3.5–5.3)
PROT SERPL-MCNC: 6.4 G/DL (ref 6.4–8.2)
SODIUM SERPL-SCNC: 143 MMOL/L (ref 136–145)
VIT B12 SERPL-MCNC: 416 PG/ML (ref 211–911)

## 2024-05-08 PROCEDURE — 82306 VITAMIN D 25 HYDROXY: CPT

## 2024-05-08 PROCEDURE — 80053 COMPREHEN METABOLIC PANEL: CPT

## 2024-05-08 PROCEDURE — 82607 VITAMIN B-12: CPT

## 2024-05-16 DIAGNOSIS — J45.20 MILD INTERMITTENT ASTHMA WITHOUT COMPLICATION (HHS-HCC): ICD-10-CM

## 2024-05-16 RX ORDER — FLUTICASONE PROPIONATE 50 MCG
1 SPRAY, SUSPENSION (ML) NASAL DAILY
Qty: 16 ML | Refills: 1 | Status: SHIPPED | OUTPATIENT
Start: 2024-05-16 | End: 2024-07-15

## 2024-06-04 DIAGNOSIS — I63.9 CEREBROVASCULAR ACCIDENT (CVA), UNSPECIFIED MECHANISM (MULTI): ICD-10-CM

## 2024-06-05 RX ORDER — APIXABAN 5 MG/1
5 TABLET, FILM COATED ORAL EVERY 12 HOURS
Qty: 180 TABLET | Refills: 0 | Status: SHIPPED | OUTPATIENT
Start: 2024-06-05

## 2024-06-17 DIAGNOSIS — J45.20 MILD INTERMITTENT ASTHMA WITHOUT COMPLICATION (HHS-HCC): ICD-10-CM

## 2024-06-17 DIAGNOSIS — I10 ESSENTIAL HYPERTENSION: ICD-10-CM

## 2024-06-17 RX ORDER — MONTELUKAST SODIUM 10 MG/1
10 TABLET ORAL NIGHTLY
Qty: 30 TABLET | Refills: 1 | Status: SHIPPED | OUTPATIENT
Start: 2024-06-17 | End: 2024-08-16

## 2024-06-17 RX ORDER — SPIRONOLACTONE AND HYDROCHLOROTHIAZIDE 25; 25 MG/1; MG/1
1 TABLET ORAL DAILY
Qty: 30 TABLET | Refills: 1 | Status: SHIPPED | OUTPATIENT
Start: 2024-06-17

## 2024-07-14 DIAGNOSIS — I10 ESSENTIAL HYPERTENSION: ICD-10-CM

## 2024-07-15 RX ORDER — AMLODIPINE BESYLATE 10 MG/1
10 TABLET ORAL DAILY
Qty: 90 TABLET | Refills: 0 | Status: SHIPPED | OUTPATIENT
Start: 2024-07-15

## 2024-07-16 DIAGNOSIS — I63.9 CEREBROVASCULAR ACCIDENT (CVA), UNSPECIFIED MECHANISM (MULTI): ICD-10-CM

## 2024-07-16 DIAGNOSIS — E55.9 VITAMIN D DEFICIENCY: ICD-10-CM

## 2024-07-16 DIAGNOSIS — I10 ESSENTIAL HYPERTENSION: ICD-10-CM

## 2024-07-16 RX ORDER — APIXABAN 5 MG/1
5 TABLET, FILM COATED ORAL EVERY 12 HOURS
Qty: 180 TABLET | Refills: 0 | Status: SHIPPED | OUTPATIENT
Start: 2024-07-16

## 2024-07-17 RX ORDER — ERGOCALCIFEROL 1.25 MG/1
1.25 CAPSULE ORAL
Qty: 4 CAPSULE | Refills: 1 | Status: SHIPPED | OUTPATIENT
Start: 2024-07-21 | End: 2024-09-19

## 2024-07-17 RX ORDER — CARVEDILOL 25 MG/1
25 TABLET ORAL 2 TIMES DAILY
Qty: 180 TABLET | Refills: 0 | Status: SHIPPED | OUTPATIENT
Start: 2024-07-17

## 2024-08-02 ENCOUNTER — LAB (OUTPATIENT)
Dept: LAB | Facility: LAB | Age: 69
End: 2024-08-02
Payer: COMMERCIAL

## 2024-08-02 ENCOUNTER — OFFICE VISIT (OUTPATIENT)
Dept: PRIMARY CARE | Facility: CLINIC | Age: 69
End: 2024-08-02
Payer: COMMERCIAL

## 2024-08-02 VITALS — BODY MASS INDEX: 26.78 KG/M2 | SYSTOLIC BLOOD PRESSURE: 120 MMHG | DIASTOLIC BLOOD PRESSURE: 62 MMHG | WEIGHT: 156 LBS

## 2024-08-02 DIAGNOSIS — E78.5 HYPERLIPIDEMIA, UNSPECIFIED HYPERLIPIDEMIA TYPE: ICD-10-CM

## 2024-08-02 DIAGNOSIS — R74.01 TRANSAMINITIS: ICD-10-CM

## 2024-08-02 DIAGNOSIS — I63.9 CEREBROVASCULAR ACCIDENT (CVA), UNSPECIFIED MECHANISM (MULTI): ICD-10-CM

## 2024-08-02 DIAGNOSIS — H61.22 IMPACTED CERUMEN OF LEFT EAR: Primary | ICD-10-CM

## 2024-08-02 DIAGNOSIS — E53.8 VITAMIN B12 DEFICIENCY: ICD-10-CM

## 2024-08-02 DIAGNOSIS — I10 ESSENTIAL HYPERTENSION: ICD-10-CM

## 2024-08-02 DIAGNOSIS — N17.9 AKI (ACUTE KIDNEY INJURY) (CMS-HCC): ICD-10-CM

## 2024-08-02 LAB
CREAT UR-MCNC: 86.8 MG/DL (ref 20–320)
MICROALBUMIN UR-MCNC: <7 MG/L
MICROALBUMIN/CREAT UR: NORMAL MG/G{CREAT}

## 2024-08-02 PROCEDURE — 80061 LIPID PANEL: CPT

## 2024-08-02 PROCEDURE — 82043 UR ALBUMIN QUANTITATIVE: CPT

## 2024-08-02 PROCEDURE — 82570 ASSAY OF URINE CREATININE: CPT

## 2024-08-02 PROCEDURE — 80053 COMPREHEN METABOLIC PANEL: CPT

## 2024-08-02 RX ORDER — CYANOCOBALAMIN 1000 UG/ML
1000 INJECTION, SOLUTION INTRAMUSCULAR; SUBCUTANEOUS ONCE
Status: COMPLETED | OUTPATIENT
Start: 2024-08-02 | End: 2024-08-02

## 2024-08-02 NOTE — PROGRESS NOTES
Primary Care Visit Note    Patient: Zo Nunez, Age: 69 y.o., SEX: female , MRN:[id number],   PCP: Caridad Gonzalez MD        Resident:  Shabbir Rose MD   Preferred Pharmacy:   [facility]  [address], [address]  [facility] [address]  Phone: [phone] Fax: [phone]    [id number] - [place], [place] - [address]  [address]  [facility] [address]  Phone: [phone] Fax: [phone]    [id number] - [place], [place] - [address] [facility]  [id number] [place] [address]  Phone: [phone] Fax: [phone]          Chief Complaint     Patient: Zo Nunez is a 69 y.o. female who presented to the clinic for No chief complaint on file.       Subjective      HPI    Zo Nunez is a 69 y.o. 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 Patient stated she had sick contact with one of her fitness club friends who stated she had covid. Stated its ringing, no pain. There are times she is losing sleep from it.     Was also requesting she be allowed to drive. She was told not to drive.     Currently under a lot of stress. She has her son living with her after she had stroke. Her son is an addict, comes in late at night, uses her car for these activities, brings in cousin to facilitate his addiction habits. She states she is in the process of moving out. His addiction habits might make her fall back into addiction too. Which is why she does understand moving out is the only option for her. Denies smoking, drinking, recreational drug use.     Patient stated she is not currently taking atorvastatin and spirinolactone-hydrochlorothiazide 25-25 mg 2/2 transaminitis  and NICHOLE respectively. Not taking monthly B12 shots that were previosuly recommended for her to get.    Transthoracic Echo 12/30/2023:  1. Left ventricular systolic function is normal with a 60-65% estimated ejection fraction.  2. The left atrium is moderately dilated.    Past History     PMHx:    has a past medical history of Acute upper respiratory infection, unspecified, Alcohol dependence, in remission (Multi) (12/26/2019), Alcohol dependence, in remission (Multi) (12/26/2019), Constipation, unspecified (12/26/2019), Essential (primary) hypertension (12/26/2019), Other malaise (12/26/2019), Personal history of other diseases of the circulatory system (12/26/2019), Personal history of other diseases of the musculoskeletal system and connective tissue (12/26/2019), Personal history of other specified conditions (12/26/2019), Pressure ulcer of sacral region, stage 2 (Multi) (12/26/2019), and Trichomonal vulvovaginitis.     Allergies:   Allergies   Allergen Reactions    Amiloride-Hydrochlorothiazide Unknown     pt states that she cannot tolerate  hctz by itself ; pt states that she does take a combination med with hctz and tolerates that fine.spasms in legs, neck and back    Milk GI Upset    Hydrochlorothiazide Unknown      Surgical Hx:   Past Surgical History:   Procedure Laterality Date    OTHER SURGICAL HISTORY  12/26/2019    Decubitus ulcer debridement      Social HX:   Social History     Tobacco Use    Smoking status: Never    Smokeless tobacco: Never   Substance Use Topics    Alcohol use: Not Currently    Drug use: Never      MEDS:   Current Outpatient Medications   Medication Instructions    albuterol 90 mcg/actuation inhaler 1 puff, inhalation, Every 6 hours PRN    amLODIPine (NORVASC) 10 mg, oral, Daily    atorvastatin (LIPITOR) 80 mg, oral, Nightly    carvedilol (COREG) 25 mg, oral, 2 times daily    cyanocobalamin (VITAMIN B-12) 1,000 mcg, intramuscular, Every 30 days    cyclobenzaprine (Flexeril) 5 mg  tablet TAKE 1 TABLET BY MOUTH ONE TO TWO TIMES DAILY AS NEEDED    diphenhydrAMINE (Benadryl Allergy) 25 mg tablet oral, Every 4 hours RT    Eliquis 5 mg, oral, Every 12 hours    ergocalciferol (VITAMIN D-2) 1,250 mcg, oral, Once Weekly    fluticasone (Flonase) 50 mcg/actuation nasal spray 1 spray, Each Nostril, Daily    hydrALAZINE (APRESOLINE) 10 mg, oral, 2 times daily    LACTULOSE ORAL oral    levothyroxine (SYNTHROID, LEVOXYL) 25 mcg, oral, Daily before breakfast    lisinopril 40 mg, oral, Daily    montelukast (SINGULAIR) 10 mg, oral, Nightly    NIFEdipine ER (ADALAT CC) 30 mg, oral, Daily    omeprazole (PRILOSEC) 40 mg, oral, Daily before breakfast    spironolacton-hydrochlorothiaz (Aldactazide) 25-25 mg tablet 25 mg, oral, Daily      Objective      Visit Vitals  /62      BMI Readings from Last 15 Encounters:   08/02/24 26.78 kg/m²   04/19/24 27.46 kg/m²   02/27/24 26.61 kg/m²   01/24/24 24.89 kg/m²   01/17/24 24.89 kg/m²   01/10/24 25.23 kg/m²   01/06/24 28.06 kg/m²   12/30/23 27.96 kg/m²   06/21/22 28.48 kg/m²   05/06/21 33.91 kg/m²   09/21/20 31.62 kg/m²   09/16/20 26.78 kg/m²   07/24/20 31.05 kg/m²   06/17/20 31.24 kg/m²   06/05/20 26.18 kg/m²      Lab Results   Component Value Date    HGBA1C 5.8 (H) 04/22/2024      Lab Results   Component Value Date    HGBA1C 5.8 (H) 04/22/2024    HGBA1C 4.8 12/30/2023    HGBA1C 5.3 06/21/2022     Lab Results   Component Value Date    LDLCALC 62 12/30/2023    CREATININE 1.18 (H) 05/08/2024      Lab Results   Component Value Date    WBC 7.1 04/22/2024    HGB 11.3 (L) 04/22/2024    HCT 37.7 04/22/2024    MCV 90 04/22/2024     04/22/2024        Chemistry    Lab Results   Component Value Date/Time     05/08/2024 1535    K 4.4 05/08/2024 1535     (H) 05/08/2024 1535    CO2 26 05/08/2024 1535    BUN 27 (H) 05/08/2024 1535    CREATININE 1.18 (H) 05/08/2024 1535    Lab Results   Component Value Date/Time    CALCIUM 9.2 05/08/2024 1535    ALKPHOS 116  05/08/2024 1535    AST 40 (H) 05/08/2024 1535    ALT 60 (H) 05/08/2024 1535    BILITOT 0.4 05/08/2024 1535             Physical Exam  Physical Exam  Constitutional:       Appearance: Normal appearance.   HENT:      Head: Normocephalic and atraumatic.      Mouth/Throat:      Mouth: Mucous membranes are moist.   Cardiovascular:      Rate and Rhythm: Normal rate and regular rhythm.      Heart sounds: No murmur heard.     No friction rub. No gallop.   Pulmonary:      Breath sounds: Normal breath sounds. No stridor. No wheezing or rhonchi.   Abdominal:      General: Abdomen is flat. Bowel sounds are normal.      Palpations: Abdomen is soft. There is no mass.      Tenderness: There is no abdominal tenderness. There is no guarding.   Musculoskeletal:         General: No swelling or tenderness. Normal range of motion.      Cervical back: Normal range of motion.      Right lower leg: Edema present.      Left lower leg: Edema present.   Neurological:      General: No focal deficit present.      Mental Status: She is alert and oriented to person, place, and time.   Psychiatric:         Mood and Affect: Mood normal.         Behavior: Behavior normal.        Assessment and Plan     Assessment/Plan    The following medical issues were discussed during this encounter  :     Diagnoses and all orders for this visit:    # Impacted cerumen of left ear  -     Ear cleaning with lavage done    # Essential hypertension  -     Blood pressure appropriately managed on current antihypertensive medication.  BP at this visit 120/62 mmHg  -     Patient was requested to stop spironolactone-hydrochlorothiazide 25-25 mg every day - continue to hold  -     Continue lisinopril 40 mg every day, carvedilol 25 mg twice daily, amlodipine 10 mg every day, hydralazine 10 mg twice daily, nifedipine ER 30 mg every day  -     Ordered Albumin-Creatinine Ratio, Urine Random; Future    # Hyperlipidemia, unspecified hyperlipidemia type  -     Given that patient  was previously asked to stop taking her atorvastatin 80 mg nightly ordered Lipid panel; Future  -     Review of lipid panel from 12/30/2023 showed normal cholesterol at 153, HDL 79.3, LDL 62, triglycerides 59    # Transaminitis  -     Review of labs from last 2 visits showed elevated AST and ALT.  AST from 04/22 at 84--> from 05/08 at 40.  ALT from 04/22 at 133 --> from 05/08 at 60  -     It was recommended last visit on 04/24/2024 that patient stopped taking her atorvastatin 80 mg nightly.  Patient stated that she has not taken her statin medication since then  -     Ordered comprehensive metabolic panel; Future    # NICHOLE (acute kidney injury) (CMS-HCC)  -     Review of labs from last 2 visits showed BUN and creatinine elevated.  BUN from 04/22 at 54 --> from 05/08 at 27.  Creatinine from 04/22 at 1.79 --> from 05/08 at 1.18  -     Last visit on 04/24/2024 patient was recommended to stop taking spironolactone-hydrochlorothiazide 25-25 mg every day.  Patient stated that she has not taken her water pill since then  -     Ordered repeat comprehensive metabolic panel; Future today to monitor for improvement    # Vitamin B12 deficiency  -     It was recommended that patient gets monthly vitamin B12 injections  -     Patient has been noncompliant with coming every month to get her B12 injection  -     Reviewed vitamin B12 from 05/08/2024: 416 (WNL)  -     Patient was given cyanocobalamin (Vitamin B-12) injection 1,000 mcg IM    # Cerebrovascular accident (CVA), unspecified mechanism (Multi)  -     Patient has been going on walks, also goes to fitness club  -     Has significant improvement in weakness, dysphagia and speech  -     Was requesting she be allowed to drive again.  -     Given that history of CVA was 6 months ago and she has improved, permission granted for her to drive again.  Did recommend the patient get her driving test free done prior to driving again on the    Health maintenance  -     Comprehensive  metabolic panel; Future  -     Lipid panel; Future  -     Reviewed lab from 04/22/2024: CBC with low hemoglobin 11.3, CMP with elevated BUN 54 and creatinine elevated at 1.79, TSH normal at 2.35, vitamin D normal at 36, folate normal at 16.4, vitamin B12 normal at 388, hemoglobin A1c at 5.8.  -     Reviewed labs from 05/08/2024:  CMP with BUN 27 and creatinine 1.18  -     Reviewed DEXA bone scan from 04/24/2024: Normal.  Repeat due in 2 years I.e 04/2026  -     Mammogram was ordered 04/19/2024: Patient needs to schedule it  -     Cologuard ordered 04/19/2024 --> patient states its at house. Her daughter has a lot of work to do. She will try to get it done today.    Immunizations: UTD     Please return on August 23 1:00 PM or if symptoms worsen     Shabbir Rose MD  Internal Medicine, PGY- 2  08/02/24 at 12:22 PM

## 2024-08-02 NOTE — PATIENT INSTRUCTIONS
- Currently you have minimal deficit from the CVA. You are good to drive. Please take driving test prior to getting back on the road

## 2024-08-02 NOTE — PROGRESS NOTES
I reviewed the resident/fellow's documentation and discussed the patient with the resident/fellow. I agree with the resident/fellow's medical decision making as documented in the note.    Caridad Gonzalez MD

## 2024-08-03 LAB
ALBUMIN SERPL BCP-MCNC: 4.1 G/DL (ref 3.4–5)
ALP SERPL-CCNC: 131 U/L (ref 33–136)
ALT SERPL W P-5'-P-CCNC: 50 U/L (ref 7–45)
ANION GAP SERPL CALC-SCNC: 14 MMOL/L (ref 10–20)
AST SERPL W P-5'-P-CCNC: 30 U/L (ref 9–39)
BILIRUB SERPL-MCNC: 0.4 MG/DL (ref 0–1.2)
BUN SERPL-MCNC: 21 MG/DL (ref 6–23)
CALCIUM SERPL-MCNC: 9.3 MG/DL (ref 8.6–10.6)
CHLORIDE SERPL-SCNC: 108 MMOL/L (ref 98–107)
CHOLEST SERPL-MCNC: 146 MG/DL (ref 0–199)
CHOLESTEROL/HDL RATIO: 2.5
CO2 SERPL-SCNC: 22 MMOL/L (ref 21–32)
CREAT SERPL-MCNC: 1.08 MG/DL (ref 0.5–1.05)
EGFRCR SERPLBLD CKD-EPI 2021: 55 ML/MIN/1.73M*2
GLUCOSE SERPL-MCNC: 130 MG/DL (ref 74–99)
HDLC SERPL-MCNC: 58 MG/DL
LDLC SERPL CALC-MCNC: 74 MG/DL
NON HDL CHOLESTEROL: 88 MG/DL (ref 0–149)
POTASSIUM SERPL-SCNC: 3.9 MMOL/L (ref 3.5–5.3)
PROT SERPL-MCNC: 6.3 G/DL (ref 6.4–8.2)
SODIUM SERPL-SCNC: 140 MMOL/L (ref 136–145)
TRIGL SERPL-MCNC: 72 MG/DL (ref 0–149)
VLDL: 14 MG/DL (ref 0–40)

## 2024-08-12 DIAGNOSIS — I10 ESSENTIAL HYPERTENSION: ICD-10-CM

## 2024-08-12 RX ORDER — NIFEDIPINE 30 MG/1
30 TABLET, EXTENDED RELEASE ORAL DAILY
Qty: 30 TABLET | Refills: 1 | Status: SHIPPED | OUTPATIENT
Start: 2024-08-12

## 2024-08-20 DIAGNOSIS — J45.20 MILD INTERMITTENT ASTHMA WITHOUT COMPLICATION (HHS-HCC): ICD-10-CM

## 2024-08-20 RX ORDER — MONTELUKAST SODIUM 10 MG/1
10 TABLET ORAL NIGHTLY
Qty: 30 TABLET | Refills: 2 | Status: SHIPPED | OUTPATIENT
Start: 2024-08-20 | End: 2024-11-18

## 2024-08-21 DIAGNOSIS — I10 ESSENTIAL HYPERTENSION: ICD-10-CM

## 2024-08-21 RX ORDER — NIFEDIPINE 30 MG/1
30 TABLET, EXTENDED RELEASE ORAL DAILY
Qty: 90 TABLET | Refills: 0 | Status: SHIPPED | OUTPATIENT
Start: 2024-08-21

## 2024-08-23 ENCOUNTER — APPOINTMENT (OUTPATIENT)
Dept: PRIMARY CARE | Facility: CLINIC | Age: 69
End: 2024-08-23
Payer: COMMERCIAL

## 2024-08-23 DIAGNOSIS — I10 ESSENTIAL HYPERTENSION: Primary | ICD-10-CM

## 2024-08-23 DIAGNOSIS — E53.8 VITAMIN B12 DEFICIENCY: ICD-10-CM

## 2024-08-23 DIAGNOSIS — H61.23 BILATERAL HEARING LOSS DUE TO CERUMEN IMPACTION: ICD-10-CM

## 2024-08-23 DIAGNOSIS — I63.9 CEREBROVASCULAR ACCIDENT (CVA), UNSPECIFIED MECHANISM (MULTI): ICD-10-CM

## 2024-08-23 PROCEDURE — 1160F RVW MEDS BY RX/DR IN RCRD: CPT | Performed by: INTERNAL MEDICINE

## 2024-08-23 PROCEDURE — G2211 COMPLEX E/M VISIT ADD ON: HCPCS | Performed by: INTERNAL MEDICINE

## 2024-08-23 PROCEDURE — 1159F MED LIST DOCD IN RCRD: CPT | Performed by: INTERNAL MEDICINE

## 2024-08-23 PROCEDURE — 1123F ACP DISCUSS/DSCN MKR DOCD: CPT | Performed by: INTERNAL MEDICINE

## 2024-08-23 PROCEDURE — 99214 OFFICE O/P EST MOD 30 MIN: CPT | Performed by: INTERNAL MEDICINE

## 2024-08-23 PROCEDURE — 1157F ADVNC CARE PLAN IN RCRD: CPT | Performed by: INTERNAL MEDICINE

## 2024-08-23 ASSESSMENT — ENCOUNTER SYMPTOMS
WEAKNESS: 1
PALPITATIONS: 0
FATIGUE: 0
NERVOUS/ANXIOUS: 0
DIARRHEA: 0
CHEST TIGHTNESS: 0
ABDOMINAL PAIN: 0
DIFFICULTY URINATING: 0
ARTHRALGIAS: 0
SHORTNESS OF BREATH: 0
LIGHT-HEADEDNESS: 0
CONSTIPATION: 0
MYALGIAS: 0
SLEEP DISTURBANCE: 0
SPEECH DIFFICULTY: 1
ABDOMINAL DISTENTION: 0
FEVER: 0
COUGH: 0
JOINT SWELLING: 0
DYSPHORIC MOOD: 0

## 2024-08-23 NOTE — PROGRESS NOTES
Subjective   Patient ID: Zo Nunez is a 69 y.o. female who presents for No chief complaint on file..    Pt unclear what medicine she is taking, her daughter supervises her meds since the stroke.     Left ear pain, new since the last time she was in the office. She thinks it's because if ear wax, she got ear drops to help clear the wax out but has not been able to use it yet.    She reports she is eating much better, is exercising. She reports her that though she does still feel she has some weakness/word finding/slurring problems, these have continued to improve since the last time she was here.    She would like documentation saying she can try to learn how to drive for a program she wants to participate in that's for learning how to drive again    Daughter checks BP every day, she is not sure what it runs.    She is doing well, says she has been able to see the positive side and be appreciative for everything she is still able to do. She does still live w/ her son and is trying to figure out how to move as he suffers from addiction issues and she doesn't want to fall back into her old addiction habits.        Review of Systems   Constitutional:  Negative for fatigue and fever.   Respiratory:  Negative for cough, chest tightness and shortness of breath.    Cardiovascular:  Negative for chest pain, palpitations and leg swelling.   Gastrointestinal:  Negative for abdominal distention, abdominal pain, constipation and diarrhea.   Genitourinary:  Negative for difficulty urinating.   Musculoskeletal:  Negative for arthralgias, joint swelling and myalgias.   Neurological:  Positive for speech difficulty and weakness. Negative for syncope and light-headedness.   Psychiatric/Behavioral:  Negative for dysphoric mood and sleep disturbance. The patient is not nervous/anxious.        Objective   There were no vitals taken for this visit.    Physical Exam  Constitutional:       General: She is not in acute distress.  HENT:       Right Ear: Tympanic membrane normal. There is impacted cerumen.      Left Ear: Tympanic membrane normal. There is no impacted cerumen.      Ears:      Comments: Cerumen present bilaterally  Cardiovascular:      Rate and Rhythm: Normal rate and regular rhythm.      Pulses: Normal pulses.      Heart sounds: No murmur heard.  Pulmonary:      Effort: Pulmonary effort is normal. No respiratory distress.      Breath sounds: No wheezing, rhonchi or rales.   Abdominal:      General: Abdomen is flat. There is no distension.      Palpations: Abdomen is soft.      Tenderness: There is no abdominal tenderness.   Musculoskeletal:      Right lower leg: No edema.      Left lower leg: No edema.   Neurological:      Mental Status: She is alert and oriented to person, place, and time.      Comments: Mild speech slurring, some word-finding difficulties noted         Assessment/Plan   Essential hypertension  - Will stop spironolactone/hydrochlorothiazide permanently  - Blood pressure she thinks has been fine w/o this medication, given previous NICHOLE and lack of swelling on PE, will not restart  Bilateral hearing loss due to cerumen impaction  - Ear cleaning w/ lavage today  - She will trial OTC ear wax drops to help soften  - Was cautioned against using anything to scrape out her ears at home, encouraged to return if she needs them cleaned out again  Vitamin B12 deficiency  - Continue monthly vitamin B12 injections  Cerebrovascular accident (CVA), unspecified mechanism (Multi)  - Discontinued atorvastatin given transaminitis  - We will recheck lipids in 6 months, if >70 LDL will consider beginning a different medication  - Given CVA was over 6 months ago and she has continued to improve, provided letter to driving program that she is ok to work on relearning driving at this time  Transaminitis  - Improving on repeat labs done last visit, however will still discontinue atorvastatin  Health maintenance  - Mammogram - she will bring  paper to daughter and try to schedule, printed our requisition for her  - Cologuard - will do, daughter has been busy but she has the kit at home    Erica Blackman, DO  Internal Medicine PGY-1

## 2024-08-23 NOTE — LETTER
August 23, 2024     Patient: Zo Nunez   YOB: 1955   Date of Visit: 8/23/2024       To Whom It May Concern:    Zo Nunez was seen in my clinic on 8/23/2024 at 1:00 pm. From medical standpoint, she is cleared to relearn how to drive.    If you have any questions or concerns, please don't hesitate to call.         Sincerely,         Caridad Gonzalez MD        CC: No Recipients

## 2024-09-04 DIAGNOSIS — K21.00 GASTROESOPHAGEAL REFLUX DISEASE WITH ESOPHAGITIS WITHOUT HEMORRHAGE: ICD-10-CM

## 2024-09-04 DIAGNOSIS — I10 ESSENTIAL HYPERTENSION: ICD-10-CM

## 2024-09-04 DIAGNOSIS — E03.9 HYPOTHYROIDISM, UNSPECIFIED TYPE: ICD-10-CM

## 2024-09-04 RX ORDER — OMEPRAZOLE 40 MG/1
40 CAPSULE, DELAYED RELEASE ORAL
Qty: 90 CAPSULE | Refills: 1 | Status: SHIPPED | OUTPATIENT
Start: 2024-09-04 | End: 2025-03-03

## 2024-09-04 RX ORDER — HYDRALAZINE HYDROCHLORIDE 10 MG/1
10 TABLET, FILM COATED ORAL 2 TIMES DAILY
Qty: 180 TABLET | Refills: 1 | Status: SHIPPED | OUTPATIENT
Start: 2024-09-04

## 2024-09-04 RX ORDER — LEVOTHYROXINE SODIUM 25 UG/1
25 TABLET ORAL
Qty: 90 TABLET | Refills: 1 | Status: SHIPPED | OUTPATIENT
Start: 2024-09-04 | End: 2025-03-03

## 2024-09-25 ENCOUNTER — CLINICAL SUPPORT (OUTPATIENT)
Dept: PRIMARY CARE | Facility: CLINIC | Age: 69
End: 2024-09-25
Payer: COMMERCIAL

## 2024-09-25 DIAGNOSIS — E53.8 VITAMIN B12 DEFICIENCY: ICD-10-CM

## 2024-09-25 PROCEDURE — 96372 THER/PROPH/DIAG INJ SC/IM: CPT | Performed by: INTERNAL MEDICINE

## 2024-09-25 RX ORDER — CYANOCOBALAMIN 1000 UG/ML
1000 INJECTION, SOLUTION INTRAMUSCULAR; SUBCUTANEOUS ONCE
Status: COMPLETED | OUTPATIENT
Start: 2024-09-25 | End: 2024-09-25

## 2024-10-11 DIAGNOSIS — I10 ESSENTIAL HYPERTENSION: ICD-10-CM

## 2024-10-11 RX ORDER — LISINOPRIL 40 MG/1
40 TABLET ORAL DAILY
Qty: 90 TABLET | Refills: 0 | Status: SHIPPED | OUTPATIENT
Start: 2024-10-11

## 2024-10-12 DIAGNOSIS — I10 ESSENTIAL HYPERTENSION: ICD-10-CM

## 2024-10-14 RX ORDER — CARVEDILOL 25 MG/1
25 TABLET ORAL 2 TIMES DAILY
Qty: 180 TABLET | Refills: 1 | Status: SHIPPED | OUTPATIENT
Start: 2024-10-14

## 2024-10-16 ENCOUNTER — APPOINTMENT (OUTPATIENT)
Dept: PRIMARY CARE | Facility: CLINIC | Age: 69
End: 2024-10-16
Payer: COMMERCIAL

## 2024-10-23 ENCOUNTER — APPOINTMENT (OUTPATIENT)
Dept: PRIMARY CARE | Facility: CLINIC | Age: 69
End: 2024-10-23
Payer: COMMERCIAL

## 2024-10-23 VITALS
SYSTOLIC BLOOD PRESSURE: 136 MMHG | WEIGHT: 150.9 LBS | DIASTOLIC BLOOD PRESSURE: 60 MMHG | BODY MASS INDEX: 25.9 KG/M2 | HEART RATE: 60 BPM | OXYGEN SATURATION: 98 %

## 2024-10-23 DIAGNOSIS — I63.9 CEREBROVASCULAR ACCIDENT (CVA), UNSPECIFIED MECHANISM (MULTI): Primary | ICD-10-CM

## 2024-10-23 DIAGNOSIS — I10 PRIMARY HYPERTENSION: Primary | ICD-10-CM

## 2024-10-23 DIAGNOSIS — E53.8 VITAMIN B12 DEFICIENCY: ICD-10-CM

## 2024-10-23 DIAGNOSIS — I69.351 HEMIPLEGIA AND HEMIPARESIS FOLLOWING CEREBRAL INFARCTION AFFECTING RIGHT DOMINANT SIDE (MULTI): ICD-10-CM

## 2024-10-23 DIAGNOSIS — I63.9 CEREBROVASCULAR ACCIDENT (CVA), UNSPECIFIED MECHANISM (MULTI): ICD-10-CM

## 2024-10-23 DIAGNOSIS — I10 ESSENTIAL HYPERTENSION: ICD-10-CM

## 2024-10-23 DIAGNOSIS — H61.23 BILATERAL IMPACTED CERUMEN: ICD-10-CM

## 2024-10-23 DIAGNOSIS — R73.9 HYPERGLYCEMIA: ICD-10-CM

## 2024-10-23 PROCEDURE — 3078F DIAST BP <80 MM HG: CPT | Performed by: INTERNAL MEDICINE

## 2024-10-23 PROCEDURE — 1036F TOBACCO NON-USER: CPT | Performed by: INTERNAL MEDICINE

## 2024-10-23 PROCEDURE — 1157F ADVNC CARE PLAN IN RCRD: CPT | Performed by: INTERNAL MEDICINE

## 2024-10-23 PROCEDURE — 1123F ACP DISCUSS/DSCN MKR DOCD: CPT | Performed by: INTERNAL MEDICINE

## 2024-10-23 PROCEDURE — 96372 THER/PROPH/DIAG INJ SC/IM: CPT | Performed by: INTERNAL MEDICINE

## 2024-10-23 PROCEDURE — 99214 OFFICE O/P EST MOD 30 MIN: CPT | Performed by: INTERNAL MEDICINE

## 2024-10-23 PROCEDURE — 1159F MED LIST DOCD IN RCRD: CPT | Performed by: INTERNAL MEDICINE

## 2024-10-23 PROCEDURE — 3075F SYST BP GE 130 - 139MM HG: CPT | Performed by: INTERNAL MEDICINE

## 2024-10-23 RX ORDER — AMLODIPINE BESYLATE 10 MG/1
10 TABLET ORAL DAILY
Qty: 90 TABLET | Refills: 1 | Status: SHIPPED | OUTPATIENT
Start: 2024-10-23

## 2024-10-23 RX ORDER — CYANOCOBALAMIN 1000 UG/ML
1000 INJECTION, SOLUTION INTRAMUSCULAR; SUBCUTANEOUS ONCE
Status: COMPLETED | OUTPATIENT
Start: 2024-10-23 | End: 2024-10-23

## 2024-10-23 ASSESSMENT — ENCOUNTER SYMPTOMS
FEVER: 0
COUGH: 0
CHILLS: 0
SHORTNESS OF BREATH: 0
SPEECH DIFFICULTY: 1
ABDOMINAL PAIN: 0

## 2024-10-23 NOTE — PATIENT INSTRUCTIONS
You can get the RSV vaccine at your pharmacy.   ~  Please ask your daughter to give you the Cologuard colon cancer screening test so you can send it in. Thanks     Please ask your daughter to schedule a mammogram.   ~  You may use Debrox for earwax at home. Follow the instructions on the packet.   ~  Follow up with a neurologist for your stroke.

## 2024-10-23 NOTE — PROGRESS NOTES
I reviewed the resident/fellow's documentation and discussed the patient with the resident/fellow. I agree with the resident/fellow's medical decision making as documented in the note.  As the attending physician, I certify that I personally reviewed the patient's history and personally examined the patient to confirm the physical findings described above, and that I reviewed the relevant imaging studies and available reports. I also discussed the differential diagnosis and all of the proposed management plans with the patient and individuals accompanying the patient to this visit. They had the opportunity to ask questions about the proposed management plans and to have those questions answered.     Caridad Gonzalez MD

## 2024-10-23 NOTE — ASSESSMENT & PLAN NOTE
S/p ear cleaning by SONIA Rosen (thanks)   Recheck looks improved  Can use debrox at home, dicussed with pt

## 2024-10-23 NOTE — PROGRESS NOTES
Subjective   Patient ID: Zo Nunez is a 69 y.o. female who presents for Abdominal Pain.    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   She is here for b12 shots which she gets once a month. Also needs a letter saying she is able to work with her hx of stroke. Prior records reviewed, pt has residual mild aphasia from stroke much improved from before. 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   164/69 bp this visit. 136/60 on recheck.   Also would like her ears cleaned.   Daughter helps her with her medications.   Is not following neurology for stoke, and no follow up appointments found.   Other ROS negative           Review of Systems   Constitutional:  Negative for chills and fever.   Respiratory:  Negative for cough and shortness of breath.    Cardiovascular:  Negative for chest pain and leg swelling.   Gastrointestinal:  Negative for abdominal pain.   Neurological:  Positive for speech difficulty.   All other systems reviewed and are negative.      Objective   /60 (BP Location: Left arm, Patient Position: Sitting)   Pulse 60   Wt 68.4 kg (150 lb 14.4 oz)   SpO2 98%   BMI 25.90 kg/m²     Physical Exam  Vitals reviewed.   Constitutional:       General: She is not in acute distress.  HENT:      Head: Normocephalic and atraumatic.      Comments: Mild cerumen impaction bl      Right Ear: There is impacted cerumen.      Left Ear: There is impacted cerumen.   Eyes:      Extraocular Movements: Extraocular movements intact.      Conjunctiva/sclera: Conjunctivae normal.      Pupils: Pupils are equal, round, and reactive to light.   Cardiovascular:      Rate and Rhythm: Normal rate and regular rhythm.      Heart sounds: No murmur heard.     No friction rub. No gallop.   Pulmonary:      Effort: Pulmonary effort is normal.      Breath sounds: Normal breath sounds. No  wheezing, rhonchi or rales.   Abdominal:      General: Bowel sounds are normal.      Palpations: Abdomen is soft. There is no mass.      Tenderness: There is no abdominal tenderness. There is no guarding or rebound.   Musculoskeletal:         General: No tenderness.      Cervical back: Neck supple.      Right lower leg: No edema.      Left lower leg: No edema.   Skin:     General: Skin is warm and dry.      Findings: No bruising or rash.   Neurological:      Mental Status: She is alert. Mental status is at baseline.      Sensory: No sensory deficit.      Motor: No weakness.      Comments: Answering questions, following commands. Moving all extremities.   5/5 strengths to all extremities  Ambulatory with a steady gait  Mild aphasia with difficulty getting words out. Otherwise other cranial nerves wnl    Psychiatric:         Mood and Affect: Mood and affect normal.         Assessment/Plan   Problem List Items Addressed This Visit       Cerebrovascular accident (CVA), unspecified mechanism (Multi)     Referral to neurology put in          Primary hypertension - Primary     164/69 initially, 136/60 on recheck  Continue amlodipine 10, carvedilol 25 bid, hydralazine 10 bid, lisinopril 40.   Will try amlodipine over nifedipine currently (both were ordered, nifedipine cancelled).            Relevant Medications    amLODIPine (Norvasc) 10 mg tablet    Other Relevant Orders    Comprehensive metabolic panel    Hyperglycemia    Relevant Orders    Hemoglobin A1c    Vitamin B12 deficiency    Relevant Medications    cyanocobalamin (Vitamin B-12) injection 1,000 mcg (Completed)    Bilateral impacted cerumen     S/p ear cleaning by SONIA Rosen (thanks)   Recheck looks improved  Can use debrox at home, dicussed with pt            #health Maintenance  Colonoscopy - none found, pt has Cologuard kit at home, no result yet   Mammogram - has been ordered, none found   DEXA - 4/2024 no osteoporosis   PAP: none found, aged out   Labs: get labs     Immunizations: Shingles vaccine 1/2020, pneumonia vaccine conjugate 1/2020, flu shot can get will defer, tetanus vaccine 11/2019

## 2024-10-24 ENCOUNTER — APPOINTMENT (OUTPATIENT)
Dept: PRIMARY CARE | Facility: CLINIC | Age: 69
End: 2024-10-24
Payer: COMMERCIAL

## 2024-11-15 ENCOUNTER — APPOINTMENT (OUTPATIENT)
Dept: PRIMARY CARE | Facility: CLINIC | Age: 69
End: 2024-11-15
Payer: COMMERCIAL

## 2024-11-15 DIAGNOSIS — I48.11 LONGSTANDING PERSISTENT ATRIAL FIBRILLATION (MULTI): ICD-10-CM

## 2024-11-15 RX ORDER — ATORVASTATIN CALCIUM 80 MG/1
80 TABLET, FILM COATED ORAL NIGHTLY
Qty: 90 TABLET | Refills: 1 | Status: SHIPPED | OUTPATIENT
Start: 2024-11-15 | End: 2025-05-14

## 2024-11-19 ENCOUNTER — LAB (OUTPATIENT)
Dept: LAB | Facility: LAB | Age: 69
End: 2024-11-19
Payer: COMMERCIAL

## 2024-11-19 ENCOUNTER — APPOINTMENT (OUTPATIENT)
Dept: PRIMARY CARE | Facility: CLINIC | Age: 69
End: 2024-11-19
Payer: COMMERCIAL

## 2024-11-19 VITALS
WEIGHT: 153 LBS | HEIGHT: 64 IN | DIASTOLIC BLOOD PRESSURE: 68 MMHG | HEART RATE: 62 BPM | SYSTOLIC BLOOD PRESSURE: 131 MMHG | BODY MASS INDEX: 26.12 KG/M2

## 2024-11-19 DIAGNOSIS — R73.9 HYPERGLYCEMIA: ICD-10-CM

## 2024-11-19 DIAGNOSIS — E53.8 VITAMIN B12 DEFICIENCY: ICD-10-CM

## 2024-11-19 DIAGNOSIS — R74.01 TRANSAMINITIS: ICD-10-CM

## 2024-11-19 DIAGNOSIS — I10 PRIMARY HYPERTENSION: Primary | ICD-10-CM

## 2024-11-19 DIAGNOSIS — J45.20 MILD INTERMITTENT ASTHMA WITHOUT COMPLICATION (HHS-HCC): ICD-10-CM

## 2024-11-19 DIAGNOSIS — I63.9 CEREBROVASCULAR ACCIDENT (CVA), UNSPECIFIED MECHANISM (MULTI): ICD-10-CM

## 2024-11-19 DIAGNOSIS — Z23 FLU VACCINE NEED: ICD-10-CM

## 2024-11-19 DIAGNOSIS — I10 PRIMARY HYPERTENSION: ICD-10-CM

## 2024-11-19 LAB
ALBUMIN SERPL BCP-MCNC: 4.2 G/DL (ref 3.4–5)
ALP SERPL-CCNC: 163 U/L (ref 33–136)
ALT SERPL W P-5'-P-CCNC: 52 U/L (ref 7–45)
ANION GAP SERPL CALC-SCNC: 12 MMOL/L (ref 10–20)
AST SERPL W P-5'-P-CCNC: 39 U/L (ref 9–39)
BILIRUB SERPL-MCNC: 0.5 MG/DL (ref 0–1.2)
BUN SERPL-MCNC: 25 MG/DL (ref 6–23)
CALCIUM SERPL-MCNC: 9 MG/DL (ref 8.6–10.6)
CHLORIDE SERPL-SCNC: 108 MMOL/L (ref 98–107)
CO2 SERPL-SCNC: 26 MMOL/L (ref 21–32)
CREAT SERPL-MCNC: 1.05 MG/DL (ref 0.5–1.05)
EGFRCR SERPLBLD CKD-EPI 2021: 57 ML/MIN/1.73M*2
EST. AVERAGE GLUCOSE BLD GHB EST-MCNC: 111 MG/DL
GLUCOSE SERPL-MCNC: 106 MG/DL (ref 74–99)
HBA1C MFR BLD: 5.5 %
POTASSIUM SERPL-SCNC: 4.2 MMOL/L (ref 3.5–5.3)
PROT SERPL-MCNC: 6.6 G/DL (ref 6.4–8.2)
SODIUM SERPL-SCNC: 142 MMOL/L (ref 136–145)

## 2024-11-19 PROCEDURE — 3008F BODY MASS INDEX DOCD: CPT | Performed by: INTERNAL MEDICINE

## 2024-11-19 PROCEDURE — 80053 COMPREHEN METABOLIC PANEL: CPT

## 2024-11-19 PROCEDURE — 1036F TOBACCO NON-USER: CPT | Performed by: INTERNAL MEDICINE

## 2024-11-19 PROCEDURE — 1157F ADVNC CARE PLAN IN RCRD: CPT | Performed by: INTERNAL MEDICINE

## 2024-11-19 PROCEDURE — 96372 THER/PROPH/DIAG INJ SC/IM: CPT | Performed by: INTERNAL MEDICINE

## 2024-11-19 PROCEDURE — 90471 IMMUNIZATION ADMIN: CPT | Performed by: INTERNAL MEDICINE

## 2024-11-19 PROCEDURE — 90662 IIV NO PRSV INCREASED AG IM: CPT | Performed by: INTERNAL MEDICINE

## 2024-11-19 PROCEDURE — 3078F DIAST BP <80 MM HG: CPT | Performed by: INTERNAL MEDICINE

## 2024-11-19 PROCEDURE — 3075F SYST BP GE 130 - 139MM HG: CPT | Performed by: INTERNAL MEDICINE

## 2024-11-19 PROCEDURE — 83036 HEMOGLOBIN GLYCOSYLATED A1C: CPT

## 2024-11-19 PROCEDURE — 99214 OFFICE O/P EST MOD 30 MIN: CPT | Performed by: INTERNAL MEDICINE

## 2024-11-19 PROCEDURE — 1123F ACP DISCUSS/DSCN MKR DOCD: CPT | Performed by: INTERNAL MEDICINE

## 2024-11-19 RX ORDER — CYANOCOBALAMIN 1000 UG/ML
1000 INJECTION, SOLUTION INTRAMUSCULAR; SUBCUTANEOUS ONCE
Status: COMPLETED | OUTPATIENT
Start: 2024-11-19 | End: 2024-11-19

## 2024-11-19 RX ORDER — ALBUTEROL SULFATE 90 UG/1
1 INHALANT RESPIRATORY (INHALATION) EVERY 6 HOURS PRN
Qty: 18 G | Refills: 3 | Status: SHIPPED | OUTPATIENT
Start: 2024-11-19

## 2024-11-19 ASSESSMENT — LIFESTYLE VARIABLES
HOW MANY STANDARD DRINKS CONTAINING ALCOHOL DO YOU HAVE ON A TYPICAL DAY: PATIENT DOES NOT DRINK
HOW OFTEN DO YOU HAVE A DRINK CONTAINING ALCOHOL: NEVER
SKIP TO QUESTIONS 9-10: 1
HOW OFTEN DO YOU HAVE SIX OR MORE DRINKS ON ONE OCCASION: NEVER
AUDIT-C TOTAL SCORE: 0

## 2024-11-19 ASSESSMENT — ENCOUNTER SYMPTOMS
NEUROLOGICAL NEGATIVE: 1
CARDIOVASCULAR NEGATIVE: 1
RESPIRATORY NEGATIVE: 1
CONSTITUTIONAL NEGATIVE: 1
GASTROINTESTINAL NEGATIVE: 1

## 2024-11-19 ASSESSMENT — PATIENT HEALTH QUESTIONNAIRE - PHQ9
SUM OF ALL RESPONSES TO PHQ9 QUESTIONS 1 AND 2: 0
2. FEELING DOWN, DEPRESSED OR HOPELESS: NOT AT ALL
1. LITTLE INTEREST OR PLEASURE IN DOING THINGS: NOT AT ALL

## 2024-11-19 NOTE — PROGRESS NOTES
Primary Care Visit Note    Patient: Zo Nunez, Age: 69 y.o., SEX: female , MRN:[id number],   PCP: Caridad Gonzalez MD        Resident:  Shabbir Rose MD   Preferred Pharmacy:   [id number] [address]  [address] [facility] [address]  Phone: [phone] Fax: [phone]     [facility]  [address], [address]  [facility] [address]  Phone: [phone] Fax: [phone]    [id number] - [facility] [address]  [address]  [facility] [address]  Phone: [phone] Fax: [phone]          Chief Complaint     Patient: Zo Nunez is a 69 y.o. female who presented to the clinic for   Chief Complaint   Patient presents with    Follow-up     Flu vaccine / A1C      Subjective      HPI    Zo Nunez is a 69 y.o. year old female patient with a PMH significant for CAD, atrial fibrillation (AC with Eliquis), HTN, hypothyroidism, hypovitaminosis, GERD, h/o CVA in left posterior frontal parietal region, dysphagia  presents to the clinic for follow up    Her earache is significantly improved with once a week use of debrox.  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  She was taken off spironolactone-hydrochlorothiazide secondary to NICHOLE and she states her blood pressure has been doing well at home though she has not been taking any reads at home.  Was also requesting that she be given vitamin B12 shot.    ROS   Review of Systems   Constitutional: Negative.    Respiratory: Negative.     Cardiovascular: Negative.    Gastrointestinal: Negative.    Neurological: Negative.       Past History     PMHx:    has a past medical history of Acute upper respiratory infection, unspecified, Alcohol dependence, in remission (12/26/2019), Alcohol dependence, in remission (12/26/2019), Constipation, unspecified (12/26/2019), Essential (primary) hypertension (12/26/2019),  Other malaise (12/26/2019), Personal history of other diseases of the circulatory system (12/26/2019), Personal history of other diseases of the musculoskeletal system and connective tissue (12/26/2019), Personal history of other specified conditions (12/26/2019), Pressure ulcer of sacral region, stage 2 (Multi) (12/26/2019), and Trichomonal vulvovaginitis.   Allergies:   Allergies   Allergen Reactions    Amiloride-Hydrochlorothiazide Unknown     pt states that she cannot tolerate  hctz by itself ; pt states that she does take a combination med with hctz and tolerates that fine.spasms in legs, neck and back    Milk GI Upset    Hydrochlorothiazide Unknown    Statins-Hmg-Coa Reductase Inhibitors Other     Elevated LFTs      Surgical Hx:   Past Surgical History:   Procedure Laterality Date    OTHER SURGICAL HISTORY  12/26/2019    Decubitus ulcer debridement      Social HX:   Social History     Tobacco Use    Smoking status: Never     Passive exposure: Never    Smokeless tobacco: Never   Substance Use Topics    Alcohol use: Not Currently    Drug use: Never      MEDS:   Current Outpatient Medications   Medication Instructions    albuterol 90 mcg/actuation inhaler 1 puff, inhalation, Every 6 hours PRN    amLODIPine (NORVASC) 10 mg, oral, Daily    atorvastatin (LIPITOR) 80 mg, oral, Nightly    carvedilol (COREG) 25 mg, oral, 2 times daily    cyanocobalamin (VITAMIN B-12) 1,000 mcg, Every 30 days    cyclobenzaprine (Flexeril) 5 mg tablet TAKE 1 TABLET BY MOUTH ONE TO TWO TIMES DAILY AS NEEDED    diphenhydrAMINE (Benadryl Allergy) 25 mg tablet Every 4 hours RT    Eliquis 5 mg, oral, Every 12 hours    fluticasone (Flonase) 50 mcg/actuation nasal spray 1 spray, Each Nostril, Daily    hydrALAZINE (APRESOLINE) 10 mg, oral, 2 times daily    LACTULOSE ORAL Take by mouth.    levothyroxine (SYNTHROID, LEVOXYL) 25 mcg, oral, Daily before breakfast    lisinopril 40 mg, oral, Daily    montelukast (SINGULAIR) 10 mg, oral, Nightly     omeprazole (PRILOSEC) 40 mg, oral, Daily before breakfast      Objective      Visit Vitals  /68 (BP Location: Left arm, Patient Position: Sitting, BP Cuff Size: Adult)   Pulse 62      BMI Readings from Last 15 Encounters:   11/19/24 26.26 kg/m²   10/23/24 25.90 kg/m²   08/02/24 26.78 kg/m²   04/19/24 27.46 kg/m²   02/27/24 26.61 kg/m²   01/24/24 24.89 kg/m²   01/17/24 24.89 kg/m²   01/10/24 25.23 kg/m²   01/06/24 28.06 kg/m²   12/30/23 27.96 kg/m²   06/21/22 28.48 kg/m²   05/06/21 33.91 kg/m²   09/21/20 31.62 kg/m²   09/16/20 26.78 kg/m²   07/24/20 31.05 kg/m²      Lab Results   Component Value Date    HGBA1C 5.8 (H) 04/22/2024      Lab Results   Component Value Date    HGBA1C 5.8 (H) 04/22/2024    HGBA1C 4.8 12/30/2023    HGBA1C 5.3 06/21/2022     Lab Results   Component Value Date    LDLCALC 74 08/02/2024    CREATININE 1.08 (H) 08/02/2024      Lab Results   Component Value Date    WBC 7.1 04/22/2024    HGB 11.3 (L) 04/22/2024    HCT 37.7 04/22/2024    MCV 90 04/22/2024     04/22/2024        Chemistry    Lab Results   Component Value Date/Time     08/02/2024 1151    K 3.9 08/02/2024 1151     (H) 08/02/2024 1151    CO2 22 08/02/2024 1151    BUN 21 08/02/2024 1151    CREATININE 1.08 (H) 08/02/2024 1151    Lab Results   Component Value Date/Time    CALCIUM 9.3 08/02/2024 1151    ALKPHOS 131 08/02/2024 1151    AST 30 08/02/2024 1151    ALT 50 (H) 08/02/2024 1151    BILITOT 0.4 08/02/2024 1151             Physical Exam  Physical Exam  Constitutional:       Appearance: Normal appearance.   HENT:      Head: Normocephalic and atraumatic.      Mouth/Throat:      Mouth: Mucous membranes are moist.   Cardiovascular:      Rate and Rhythm: Normal rate and regular rhythm.      Heart sounds: No murmur heard.     No friction rub. No gallop.   Pulmonary:      Breath sounds: Normal breath sounds. No stridor. No wheezing or rhonchi.   Abdominal:      General: Abdomen is flat. Bowel sounds are normal.       Palpations: Abdomen is soft. There is no mass.      Tenderness: There is no abdominal tenderness. There is no guarding.   Musculoskeletal:         General: No tenderness. Normal range of motion.      Cervical back: Normal range of motion.      Right lower leg: Edema present.      Left lower leg: Edema present.   Neurological:      General: No focal deficit present.      Mental Status: She is alert and oriented to person, place, and time.   Psychiatric:         Mood and Affect: Mood normal.         Behavior: Behavior normal.        Assessment and Plan     Assessment/Plan    The following medical issues were discussed during this encounter  : Zo was seen today for follow-up.    Diagnoses and all orders for this visit:    # Primary hypertension (Primary)  -     Blood pressure continues to remain well-controlled on current regimen and off hydrochlorothiazide-spironolactone.  Blood pressure this visit was 131/68 mmHg  - Current regimen for antihypertensive medications amlodipine 10 mg, carvedilol 25 mg twice a day, hydralazine 10 mg twice a day and lisinopril 40 mg every day  - Patient was however seen with bilateral lower extremity edema.  Recommended that next visit we will order labs for the patient and if her creatinine remains at baseline, can add 1 diuretic    # Mild intermittent asthma without complication ([facility])  -     Refilled albuterol 90 mcg/actuation inhaler; Inhale 1 puff every 6 hours if needed for wheezing.    # Vitamin B12 deficiency  -     Patient received cyanocobalamin (Vitamin B-12) injection 1,000 mcg    # Transaminitis  -     Previously patient was asked to stop taking atorvastatin 80 mg 2/2 transaminitis  - Repeat CMP has been ordered for the patient, patient has to get the labs done    # Cerebrovascular accident (CVA), unspecified mechanism (Multi)  -     Patient's weakness has significantly improved, currently she is looking for jobs, reads in her free time.  - Patient continues to not  drive currently.  She has an eye exam in February and states after that will start driving    Health maintenance  The following screening tests were ordered:  Reviewed CBC from 04/22/2024: Hemoglobin 11.3.  Repeat due 04/2025  Reviewed CMP from 08/03/2024: Glucose 130, chloride 108, creatinine 1.08, ALT 50.  Repeat has been ordered for the patient  Hemoglobin A1c 04/22/2024 5.8.  Repeat has been ordered for the patient  Reviewed lipid panel from 08/02/2024: Cholesterol 146, HDL 58, LDL 74, VLDL 14, TGs 72.  Repeat due 02/2025  Reviewed vitamin D from 05/08/2024: 37.  Repeat due 05/2025  Reviewed TSH from 04/22/2024: 3.35 (WNL.  Repeat due 04/2025-Reviewed urine creatinine ratio from 08/02/2024: Albumin less than 7.  Repeat due 02/2025  Patient still has not gotten her Cologuard done  Mammogram was ordered for the patient on 04/19/2024.  Patient has it scheduled for  December 31 11:15 AM  Reviewed DEXA bone scan from 04/24/2024: Normal.  Repeat due in 2 years I.e 04/2026    Immunizations:  -     Flu vaccine, trivalent, preservative free, HIGH-DOSE, age 65y+ (Fluzone)    Please return on 2/12/2025 or if symptoms worsen     Shabbir Rose MD  Internal Medicine, PGY- 2  11/19/24 at 12:03 PM

## 2024-11-19 NOTE — PATIENT INSTRUCTIONS
- Please go to lab downstairs to get your CMP and HbA1c done today  - [facility] shot. You can space the twox vaccines out by 1 week  - [facility]  - [facility] the latest Covid vaccine

## 2024-11-20 DIAGNOSIS — J45.20 MILD INTERMITTENT ASTHMA WITHOUT COMPLICATION (HHS-HCC): ICD-10-CM

## 2024-11-20 RX ORDER — MONTELUKAST SODIUM 10 MG/1
10 TABLET ORAL NIGHTLY
Qty: 90 TABLET | Refills: 0 | Status: SHIPPED | OUTPATIENT
Start: 2024-11-20

## 2024-11-20 NOTE — RESULT ENCOUNTER NOTE
Results reviewed.  There are some minor abnormalities, which are not concerning.  No changes in medications are needed at this time.  Please continue with current treatment.    Best,  Dr. Mclean

## 2024-12-03 DIAGNOSIS — I63.9 CEREBROVASCULAR ACCIDENT (CVA), UNSPECIFIED MECHANISM (MULTI): ICD-10-CM

## 2024-12-03 RX ORDER — APIXABAN 5 MG/1
5 TABLET, FILM COATED ORAL EVERY 12 HOURS
Qty: 180 TABLET | Refills: 0 | Status: SHIPPED | OUTPATIENT
Start: 2024-12-03

## 2024-12-31 ENCOUNTER — APPOINTMENT (OUTPATIENT)
Dept: RADIOLOGY | Facility: CLINIC | Age: 69
End: 2024-12-31
Payer: COMMERCIAL

## 2025-01-09 DIAGNOSIS — I10 ESSENTIAL HYPERTENSION: ICD-10-CM

## 2025-01-09 RX ORDER — LISINOPRIL 40 MG/1
40 TABLET ORAL DAILY
Qty: 90 TABLET | Refills: 0 | Status: SHIPPED | OUTPATIENT
Start: 2025-01-09

## 2025-01-27 ENCOUNTER — OFFICE VISIT (OUTPATIENT)
Dept: NEUROLOGY | Facility: CLINIC | Age: 70
End: 2025-01-27
Payer: COMMERCIAL

## 2025-01-27 VITALS — SYSTOLIC BLOOD PRESSURE: 173 MMHG | HEART RATE: 66 BPM | DIASTOLIC BLOOD PRESSURE: 85 MMHG

## 2025-01-27 DIAGNOSIS — I48.11 LONGSTANDING PERSISTENT ATRIAL FIBRILLATION (MULTI): ICD-10-CM

## 2025-01-27 DIAGNOSIS — I69.319 COGNITIVE DEFICIT AS LATE EFFECT OF CEREBROVASCULAR ACCIDENT (CVA): ICD-10-CM

## 2025-01-27 DIAGNOSIS — I69.320 APHASIA AS LATE EFFECT OF STROKE: Primary | ICD-10-CM

## 2025-01-27 DIAGNOSIS — Z86.73 HISTORY OF EMBOLIC STROKE: ICD-10-CM

## 2025-01-27 DIAGNOSIS — I10 PRIMARY HYPERTENSION: ICD-10-CM

## 2025-01-27 PROCEDURE — 99215 OFFICE O/P EST HI 40 MIN: CPT | Performed by: PSYCHIATRY & NEUROLOGY

## 2025-01-27 PROCEDURE — 1157F ADVNC CARE PLAN IN RCRD: CPT | Performed by: PSYCHIATRY & NEUROLOGY

## 2025-01-27 PROCEDURE — 3079F DIAST BP 80-89 MM HG: CPT | Performed by: PSYCHIATRY & NEUROLOGY

## 2025-01-27 PROCEDURE — 1123F ACP DISCUSS/DSCN MKR DOCD: CPT | Performed by: PSYCHIATRY & NEUROLOGY

## 2025-01-27 PROCEDURE — 3077F SYST BP >= 140 MM HG: CPT | Performed by: PSYCHIATRY & NEUROLOGY

## 2025-01-27 ASSESSMENT — ACTIVITIES OF DAILY LIVING (ADL)
TOILET_USE: INDEPENDENT (ON AND OFF, DRESSING, WIPING)
STAIRS: INDEPENDENT
BED_TO_CHAIR_AND_BACK: INDEPENDENT
BOWELS: INDEPENDENT (INCLUDING BUTTONS, ZIPS, LACES, ETC.)
TOTAL_SCORE: 100
MOBILITY_LEVEL_SURFACES: INDEPENDENT (BUT MAY USE ANY AID FOR EXAMPLE, STICK) > 50 YARDS
GROOMING: INDEPENDENT FACE/HAIR/TEETH.SHAVING (IMPLEMENTS PROVIDED)
DRESSING: INDEPENDENT (INCLUDING BUTTONS, ZIPS, LACES,ETC.)
BLADDER: CONTINENT
BATHING: INDEPENDENT (OR IN SHOWER)
FEEDING: INDEPENDENT

## 2025-01-27 ASSESSMENT — MINI MENTAL STATE EXAM
SAY:  READ THE WORDS ON THE PAGE AND THEN DO WHAT IT SAYS.  THEN HAND THE PERSON THE SHEET WITH CLOSE YOUR EYES ON IT.  IF THE SUBJECT READS AND DOES NOT CLOSE THEIR EYES, REPEAT UP TO THREE TIMES.  SCORE ONLY IF SUBJECT CLOSES EYES.: 3 CORRECT
PLEASE COPY THIS PICTURE (NOTE ALL 10 ANGLES MUST BE PRESENT AND TWO MUST INTERSECT): 1 CORRECT
SHOW: PENCIL [OBJECT] ASK: WHAT IS THIS CALLED?: 2 CORRECT
SPELL THE WORD WORLD FORWARD AND BACKWARDS OR SERIAL 7S: 4 CORRECT
HAND THE PERSON A PENCIL AND PAPER. SAY:  WRITE ANY COMPLETE SENTENCE ON THAT PIECE OF PAPER. (NOTE: THE SENTENCE MUST MAKE SENSE.  IGNORE SPELLING ERRORS): 1 CORRECT
RECALL THE 3 OBJECTS FROM ABOVE (APPLE, TABLE, PENNY) OR (BALL, TREE, FLAG): 3 CORRECT
WHAT IS THE YEAR, SEASON, DATE, DAY, AND MONTH: 4 CORRECT
PLACE DESIGN, ERASER AND PENCIL IN FRONT OF THE PERSON.  SAY:  COPY THIS DESIGN PLEASE.  SHOW: DESIGN. ALLOW: MULTIPLE TRIES. WAIT UNTIL PERSON IS FINISHED AND HANDS IT BACK. SCORE: ONLY FOR DIAGRAM WITH 4-SIDED FIGURE BETWEEN TWO 5-SIDED FIGURES: 1 CORRECT
WHAT STATE, COUNTRY, CITY, HOSPITAL, FLOOR: 5 CORRECT
NAME OR REPEAT 3 OBJECTS - (APPLE, TABLE, PENNY) OR (BALL, TREE, FLAG): 3 CORRECT

## 2025-01-27 NOTE — PROGRESS NOTES
Neurological [facility]   Zo Nunez is a 69 y.o. year old female presenting for neurologic evaluation.   Referred by: Caridad Gonzalez, *  PCP: Caridad Gonzalez MD    2019- Neurology ([facility])- inpatient, found down after a fall, dx with rhabdomyolysis, alcohol use with neuropathy, hypothyroidism.  Also endorsed several years of cognitive decline with microvascular changes on CT.     12/2023- presented to [facility], found down with aphasia, RHP.   BP >200, Afib not on anticoagulation, nonadherent to medical therapies. NIHSS 16. CT/ MRI- multiflocal embolism with Afib, recommended AC     01/27/25- Consult for stroke.  No new events but residual deficits.    She states that a [facility] coordinator has suggested a program that trains individuals with disabilities to resume work. 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  She is not interested in going to the program in [place] but she is interested in resuming driving.    Brought to visit by daughter, did not take any of her medications for concern she would need to go to the bathroom.  No new events. Feels speech and comprehension have improved.  Memory poor and misplaces personal items, has left credit card at store but has a system to remember medications.   Vascular risk factors- Afib, HTN, pre-DM A1c 5.8% , HPL- statins stopped due to elevated LFTs, former smoker.     Extensive review of notes in EMR, labs, tests, Interpretation of neuroimaging  As outlined- no recent imaging.      Lab Results   Component Value Date    CHOL 146 08/02/2024    TRIG 72 08/02/2024    HDL 58.0 08/02/2024    CHHDL 2.5 08/02/2024    LDLF 64 06/21/2022    VLDL 14 08/02/2024    NHDL 88 08/02/2024     Lab Results   Component Value Date     (H) 12/30/2023    HGBA1C 5.5 11/19/2024     Lab Results   Component Value Date    GLUCOSE 106 (H) 11/19/2024     11/19/2024    K 4.2  11/19/2024     (H) 11/19/2024    CO2 26 11/19/2024    ANIONGAP 12 11/19/2024    BUN 25 (H) 11/19/2024    CREATININE 1.05 11/19/2024    CALCIUM 9.0 11/19/2024    PHOS 3.7 12/08/2022    ALBUMIN 4.2 11/19/2024     Lab Results   Component Value Date    CALCIUM 9.0 11/19/2024    PHOS 3.7 12/08/2022    PROT 6.6 11/19/2024    ALBUMIN 4.2 11/19/2024    AST 39 11/19/2024    ALT 52 (H) 11/19/2024    ALKPHOS 163 (H) 11/19/2024    BILITOT 0.5 11/19/2024     Lab Results   Component Value Date    WBC 7.1 04/22/2024    HGB 11.3 (L) 04/22/2024    HCT 37.7 04/22/2024    MCV 90 04/22/2024     04/22/2024     INR   Date Value Ref Range Status   01/06/2024 1.1 0.9 - 1.1 Final     Lab Results   Component Value Date    TSH 3.35 04/22/2024       Relevant ROS, Problem list, Past Medical/ Surgical/ Family/ Social history- reviewed and pertinent details noted in history.     Objective     Visit Vitals  /85   Pulse 66   OB Status Postmenopausal   Smoking Status Never       Neuro Scores/ Scales:       Barthel Index  Feeding: independent  Bathing: independent (or in shower)  Grooming: independent face/hair/teeth.shaving (implements provided)  Dressing : independent (including buttons, zips, laces,etc.)  Bowels: independent (including buttons, zips, laces, etc.)  Bladder: continent  Toilet Use : independent (on and off, dressing, wiping)  Transfers (Bed to chair and back) : independent  Mobility (On level surfaces): independent (but may use any aid for example, stick) > 50 yards  Stairs: independent  Total (0-100): 100   NIHSS: NIH Stroke Scale 1A. Level of Consciousness: Alert, Keenly Responsive, 1B. Ask Month and Age: Both Questions Right, 1C. Blink Eyes & Squeeze Hands: Performs Both Tasks, 2. Best Gaze: Normal, 3. Visual: No Visual Loss, 4. Facial Palsy: Normal Symmetrical Movements, 5A. Motor - Left Arm: No Drift, 5B. Motor - Right Arm: No Drift, 6A. Motor - Left Leg: No Drift, 6B. Motor - Right Leg: No Drift, 7. Limb  "Ataxia: Absent, 8. Sensory Loss: Normal, 9. Best Language: No Aphasia, 10. Dysarthria: Mild-to-Moderate Dysarthria, 11. Extinction and Inattention: No Abnormality, NIH Stroke Scale: 1     Dysarthric, hesitant speech with intact language.    EOM and VF full.  Motor 5/5   MMSE:   Very slow in calculations but accurate.  Presidents x4- Esteban, Vishal, Ralph, Misael  Able to draw the 2 intersecting pentagons but unable to draw the clock- has difficulty placing numbers on clock- puts them all on the L, knows it is incorrect but uncertain how to correct it.     Assessment/Plan   1. Aphasia as late effect of stroke    2. History of embolic stroke    3. Cognitive deficit as late effect of cerebrovascular accident (CVA)    4. Longstanding persistent atrial fibrillation (Multi)    5. Primary hypertension      PLAN   Exam shows difficulties with spatial perception that would be a hazard to driving.   Recommend occupational therapy consult to work with this and if improved, we can review the driving concern again.     Goals for STROKE PREVENTION- recommendations to reduce the risk of vascular events and promote brain health include monitoring and management of vascular risk factors and lifestyle choices to goal values.     Cardiovascular disease- Goal is monitoring and management of conditions that increase stroke risk.   Antithrombotic \"blood thinner\" medication- Stroke related to atrial fibrillation.   Discussed importance of eliquis therapy taken properly twice daily- as best medication to prevent another stroke.    Blood pressure- Normal BP is <120/80 mmHg.  Blood Pressure : Goal is less than 130/80 mmHg Blood pressure was very elevated, improved after taking your medicines right now but still not at target.   Reviewed this is very important to keep blood pressure controlled.    Cholesterol- Ideal lipid profile is an LDL-cholesterol <70 mg/dl, total cholesterol <200 mg/dl, fasting triglycerides < 150 mg/dl and HDL-cholesterol " ">55 mg/dl.   Blood sugar- Blood Sugar : Goal is normal fasting sugar between  mg/dl   Healthy physical activity- Goal is a moderate level of exercise at least 30 minutes/day, most days of the week.   Healthy weight- Goal is an ideal weight with a waistline <40\" for men or <35\" for women, and BMI of 18.5-25.   Healthy diet- is rich in vegetables, fruits, whole grains, legumes and fish, low in salt, and avoids red meats and processed/ refined foods.   Healthy sleep- is restorative, ~7 hours/night, with identification and treatment of obstructive/ central sleep apnea that increases the risk of stroke and heart disease.   Smoking- Goal is to stop smoking any tobacco product and avoid second-hand smoke. For help to quit all forms of tobacco, call 6-671-QUITNOW (1-594.478.7603) to speak with a specialist at the [place] [facility].    Alcohol- Goal is moderation; no more than 2 servings for men and 1 serving for non-pregnant women.   Quit drinking after stroke- congratulations on this accomplishment.   Drug use- Goal is avoidance of illicit drugs that can cause blood pressure spikes and damage to blood vessels.   Stroke Warning Signs- know the symptoms of stroke and the importance of calling 911/EMS to access the quickest treatment.     Orders Placed This Encounter   Procedures    Referral to Occupational Therapy                       "

## 2025-01-27 NOTE — PATIENT INSTRUCTIONS
"  Assessment/Plan   1. Aphasia as late effect of stroke    2. History of embolic stroke    3. Cognitive deficit as late effect of cerebrovascular accident (CVA)    4. Longstanding persistent atrial fibrillation (Multi)    5. Primary hypertension      PLAN   Exam shows difficulties with spatial perception that would be a hazard to driving.   Recommend occupational therapy consult to work with this and if improved, we can review the driving concern again.     Goals for STROKE PREVENTION- recommendations to reduce the risk of vascular events and promote brain health include monitoring and management of vascular risk factors and lifestyle choices to goal values.     Cardiovascular disease- Goal is monitoring and management of conditions that increase stroke risk.   Antithrombotic \"blood thinner\" medication- Stroke related to atrial fibrillation.   Discussed importance of eliquis therapy taken properly twice daily- as best medication to prevent another stroke.    Blood pressure- Normal BP is <120/80 mmHg.  Blood Pressure : Goal is less than 130/80 mmHg Blood pressure was very elevated, improved after taking your medicines right now but still not at target.   Reviewed this is very important to keep blood pressure controlled.    Cholesterol- Ideal lipid profile is an LDL-cholesterol <70 mg/dl, total cholesterol <200 mg/dl, fasting triglycerides < 150 mg/dl and HDL-cholesterol >55 mg/dl.   Blood sugar- Blood Sugar : Goal is normal fasting sugar between  mg/dl   Healthy physical activity- Goal is a moderate level of exercise at least 30 minutes/day, most days of the week.   Healthy weight- Goal is an ideal weight with a waistline <40\" for men or <35\" for women, and BMI of 18.5-25.   Healthy diet- is rich in vegetables, fruits, whole grains, legumes and fish, low in salt, and avoids red meats and processed/ refined foods.   Healthy sleep- is restorative, ~7 hours/night, with identification and treatment of obstructive/ " central sleep apnea that increases the risk of stroke and heart disease.   Smoking- Goal is to stop smoking any tobacco product and avoid second-hand smoke. For help to quit all forms of tobacco, call 2-853-QUIT-NOW (1-811.177.9977) to speak with a specialist at the [place] [facility].    Alcohol- Goal is moderation; no more than 2 servings for men and 1 serving for non-pregnant women.   Quit drinking after stroke- congratulations on this accomplishment.   Drug use- Goal is avoidance of illicit drugs that can cause blood pressure spikes and damage to blood vessels.   Stroke Warning Signs- know the symptoms of stroke and the importance of calling 911/EMS to access the quickest treatment.     Thank you for choosing the [facility] for your healthcare.    It was a pleasure to see you in clinic today and be able to participate in your care. I hope your questions were fully answered but if there are questions or concerns in the future, please feel free to contact my office through [employer] messaging or call [phone].

## 2025-01-30 ENCOUNTER — TELEPHONE (OUTPATIENT)
Dept: NEUROLOGY | Facility: CLINIC | Age: 70
End: 2025-01-30
Payer: COMMERCIAL

## 2025-01-30 DIAGNOSIS — I48.0 PAROXYSMAL ATRIAL FIBRILLATION (MULTI): ICD-10-CM

## 2025-01-30 DIAGNOSIS — I63.9 CEREBROVASCULAR ACCIDENT (CVA), UNSPECIFIED MECHANISM (MULTI): Primary | ICD-10-CM

## 2025-01-30 DIAGNOSIS — I63.9 CEREBROVASCULAR ACCIDENT (CVA), UNSPECIFIED MECHANISM (MULTI): ICD-10-CM

## 2025-01-30 DIAGNOSIS — I48.91 ATRIAL FIBRILLATION, UNSPECIFIED TYPE (MULTI): ICD-10-CM

## 2025-01-30 DIAGNOSIS — J45.20 MILD INTERMITTENT ASTHMA WITHOUT COMPLICATION (HHS-HCC): ICD-10-CM

## 2025-01-30 NOTE — TELEPHONE ENCOUNTER
Zo left a message with the answering service stating she needs paperwork so she can get handicap placard.    Zo: [phone]

## 2025-02-09 DIAGNOSIS — I10 ESSENTIAL HYPERTENSION: ICD-10-CM

## 2025-02-09 RX ORDER — NIFEDIPINE 30 MG/1
30 TABLET, EXTENDED RELEASE ORAL DAILY
Qty: 90 TABLET | Refills: 0 | Status: SHIPPED | OUTPATIENT
Start: 2025-02-09

## 2025-02-12 ENCOUNTER — APPOINTMENT (OUTPATIENT)
Dept: PRIMARY CARE | Facility: CLINIC | Age: 70
End: 2025-02-12
Payer: COMMERCIAL

## 2025-02-19 DIAGNOSIS — J45.20 MILD INTERMITTENT ASTHMA WITHOUT COMPLICATION (HHS-HCC): ICD-10-CM

## 2025-02-19 RX ORDER — MONTELUKAST SODIUM 10 MG/1
10 TABLET ORAL NIGHTLY
Qty: 90 TABLET | Refills: 0 | Status: SHIPPED | OUTPATIENT
Start: 2025-02-19

## 2025-02-26 ENCOUNTER — TELEPHONE (OUTPATIENT)
Dept: PRIMARY CARE | Facility: CLINIC | Age: 70
End: 2025-02-26
Payer: COMMERCIAL

## 2025-02-26 DIAGNOSIS — I63.9 CEREBROVASCULAR ACCIDENT (CVA), UNSPECIFIED MECHANISM (MULTI): Primary | ICD-10-CM

## 2025-02-26 NOTE — TELEPHONE ENCOUNTER
Patient called and left voicemail stating she is taking a english course and would like a referral to speech language therapy.

## 2025-03-02 DIAGNOSIS — K21.00 GASTROESOPHAGEAL REFLUX DISEASE WITH ESOPHAGITIS WITHOUT HEMORRHAGE: ICD-10-CM

## 2025-03-02 DIAGNOSIS — E03.9 HYPOTHYROIDISM, UNSPECIFIED TYPE: ICD-10-CM

## 2025-03-02 DIAGNOSIS — I63.9 CEREBROVASCULAR ACCIDENT (CVA), UNSPECIFIED MECHANISM (MULTI): ICD-10-CM

## 2025-03-02 DIAGNOSIS — I10 ESSENTIAL HYPERTENSION: ICD-10-CM

## 2025-03-02 RX ORDER — HYDRALAZINE HYDROCHLORIDE 10 MG/1
10 TABLET, FILM COATED ORAL 2 TIMES DAILY
Qty: 180 TABLET | Refills: 1 | Status: SHIPPED | OUTPATIENT
Start: 2025-03-02

## 2025-03-02 RX ORDER — OMEPRAZOLE 40 MG/1
40 CAPSULE, DELAYED RELEASE ORAL
Qty: 90 CAPSULE | Refills: 1 | Status: SHIPPED | OUTPATIENT
Start: 2025-03-02 | End: 2025-08-29

## 2025-03-02 RX ORDER — LEVOTHYROXINE SODIUM 25 UG/1
25 TABLET ORAL
Qty: 90 TABLET | Refills: 1 | Status: SHIPPED | OUTPATIENT
Start: 2025-03-02 | End: 2025-08-29

## 2025-03-03 DIAGNOSIS — I48.11 LONGSTANDING PERSISTENT ATRIAL FIBRILLATION (MULTI): ICD-10-CM

## 2025-03-03 RX ORDER — ATORVASTATIN CALCIUM 80 MG/1
80 TABLET, FILM COATED ORAL NIGHTLY
Qty: 90 TABLET | Refills: 1 | Status: SHIPPED | OUTPATIENT
Start: 2025-03-03 | End: 2025-08-30

## 2025-03-03 RX ORDER — APIXABAN 5 MG/1
5 TABLET, FILM COATED ORAL EVERY 12 HOURS
Qty: 180 TABLET | Refills: 0 | Status: SHIPPED | OUTPATIENT
Start: 2025-03-03

## 2025-03-11 ENCOUNTER — APPOINTMENT (OUTPATIENT)
Dept: PRIMARY CARE | Facility: CLINIC | Age: 70
End: 2025-03-11
Payer: COMMERCIAL

## 2025-03-11 ENCOUNTER — OFFICE VISIT (OUTPATIENT)
Dept: PRIMARY CARE | Facility: CLINIC | Age: 70
End: 2025-03-11
Payer: COMMERCIAL

## 2025-03-11 VITALS
HEIGHT: 64 IN | WEIGHT: 162.2 LBS | HEART RATE: 76 BPM | DIASTOLIC BLOOD PRESSURE: 77 MMHG | SYSTOLIC BLOOD PRESSURE: 149 MMHG | BODY MASS INDEX: 27.69 KG/M2

## 2025-03-11 DIAGNOSIS — F10.21 HISTORY OF ALCOHOLISM (MULTI): ICD-10-CM

## 2025-03-11 DIAGNOSIS — R74.01 TRANSAMINITIS: ICD-10-CM

## 2025-03-11 DIAGNOSIS — E78.5 HYPERLIPIDEMIA, UNSPECIFIED HYPERLIPIDEMIA TYPE: ICD-10-CM

## 2025-03-11 DIAGNOSIS — I69.351 HEMIPLEGIA AND HEMIPARESIS FOLLOWING CEREBRAL INFARCTION AFFECTING RIGHT DOMINANT SIDE (MULTI): ICD-10-CM

## 2025-03-11 DIAGNOSIS — E53.8 VITAMIN B12 DEFICIENCY: ICD-10-CM

## 2025-03-11 DIAGNOSIS — I10 PRIMARY HYPERTENSION: ICD-10-CM

## 2025-03-11 DIAGNOSIS — I63.9 CEREBROVASCULAR ACCIDENT (CVA), UNSPECIFIED MECHANISM (MULTI): ICD-10-CM

## 2025-03-11 DIAGNOSIS — I48.0 PAROXYSMAL ATRIAL FIBRILLATION (MULTI): Primary | ICD-10-CM

## 2025-03-11 DIAGNOSIS — Z12.31 SCREENING MAMMOGRAM FOR BREAST CANCER: ICD-10-CM

## 2025-03-11 DIAGNOSIS — Z13.9 SCREENING DUE: ICD-10-CM

## 2025-03-11 DIAGNOSIS — E55.9 VITAMIN D DEFICIENCY: ICD-10-CM

## 2025-03-11 DIAGNOSIS — R73.03 PREDIABETES: ICD-10-CM

## 2025-03-11 DIAGNOSIS — S37.009D INJURY OF KIDNEY, UNSPECIFIED LATERALITY, SUBSEQUENT ENCOUNTER: ICD-10-CM

## 2025-03-11 DIAGNOSIS — J45.909 ASTHMA, UNSPECIFIED ASTHMA SEVERITY, UNSPECIFIED WHETHER COMPLICATED, UNSPECIFIED WHETHER PERSISTENT (HHS-HCC): ICD-10-CM

## 2025-03-11 PROCEDURE — 99214 OFFICE O/P EST MOD 30 MIN: CPT | Performed by: INTERNAL MEDICINE

## 2025-03-11 PROCEDURE — 1123F ACP DISCUSS/DSCN MKR DOCD: CPT | Performed by: INTERNAL MEDICINE

## 2025-03-11 PROCEDURE — 1159F MED LIST DOCD IN RCRD: CPT | Performed by: INTERNAL MEDICINE

## 2025-03-11 PROCEDURE — 3078F DIAST BP <80 MM HG: CPT | Performed by: INTERNAL MEDICINE

## 2025-03-11 PROCEDURE — 1036F TOBACCO NON-USER: CPT | Performed by: INTERNAL MEDICINE

## 2025-03-11 PROCEDURE — 1157F ADVNC CARE PLAN IN RCRD: CPT | Performed by: INTERNAL MEDICINE

## 2025-03-11 PROCEDURE — 3008F BODY MASS INDEX DOCD: CPT | Performed by: INTERNAL MEDICINE

## 2025-03-11 PROCEDURE — 3077F SYST BP >= 140 MM HG: CPT | Performed by: INTERNAL MEDICINE

## 2025-03-11 ASSESSMENT — PATIENT HEALTH QUESTIONNAIRE - PHQ9
1. LITTLE INTEREST OR PLEASURE IN DOING THINGS: NOT AT ALL
2. FEELING DOWN, DEPRESSED OR HOPELESS: NOT AT ALL
SUM OF ALL RESPONSES TO PHQ9 QUESTIONS 1 AND 2: 0

## 2025-03-11 ASSESSMENT — ENCOUNTER SYMPTOMS
LOSS OF SENSATION IN FEET: 0
OCCASIONAL FEELINGS OF UNSTEADINESS: 0
DEPRESSION: 0

## 2025-03-11 NOTE — PROGRESS NOTES
[facility]  Primary Care Visit Note    Patient: Zo Nunez, Age: 70 y.o., SEX: female , MRN:[id number],   PCP: Caridad Gonzalez MD        Resident:  Shabbir Rose MD   Preferred Pharmacy:   [id number] [address] [facility] AT [address] OF [facility]  [address] [facility] [address]  Phone: [phone] Fax: [phone]     [facility]  [address], [address]  [facility] [address]  Phone: [phone] Fax: [phone]    [id number] - [place], [place] - [address]  [address] [facility] [address]  Phone: [phone] Fax: [phone]        Chief Complaint     Patient: Zo Nunez is a 70 y.o. female who presented to the clinic for   Chief Complaint   Patient presents with    Follow-up     No Concerns      Subjective    Subjective      HPI    Zo Nunez is a 70 y.o. year old female patient with a PMH significant for  CAD, atrial fibrillation (AC with Eliquis), HTN, hypothyroidism, hypovitaminosis, GERD, h/o CVA in left posterior frontal parietal region, dysphagia with residual deficit presents to the clinic for follow up     Recently went to neurology on 01/27/2025 for appointment to restart driving.  Her exam showed difficulties with spatial perception that would be a hazard to driving.  She still uses passes to ambulate around.  Occupational therapy was recommended for her in order for her to resume her driving.  Patient also saw speech therapy once.  Was stating that she has been reading a lot to improve her dysarthria.  Previously patient could not read numbers on the clock and now can draw clock.  Patient also demonstrated good paper reading.  Recommended that she continue to see speech therapy.    Besides that, last visit we recommended that patient get RSV and shingles shot.  Patient stated she got the shots and was feeling very inflamed.  She took turmeric for  inflammation and left inflammation subsequently resolved after 2-month.    ROS   Review of Systems   The patient denies headaches, lightheadedness, changes in speech/vision, photophobia, dysphagia, diaphoresis, Chest pain, dyspnea, Abdominal pain, recent falls or trauma, and changes in bowel/urinary habits.    Past History     PMHx:    has a past medical history of Acute upper respiratory infection, unspecified, Alcohol dependence, in remission (12/26/2019), Alcohol dependence, in remission (12/26/2019), Constipation, unspecified (12/26/2019), Essential (primary) hypertension (12/26/2019), Other malaise (12/26/2019), Personal history of other diseases of the circulatory system (12/26/2019), Personal history of other diseases of the musculoskeletal system and connective tissue (12/26/2019), Personal history of other specified conditions (12/26/2019), Pressure ulcer of sacral region, stage 2 (Multi) (12/26/2019), and Trichomonal vulvovaginitis.     Allergies:   Allergies   Allergen Reactions    Amiloride-Hydrochlorothiazide Unknown     pt states that she cannot tolerate  hctz by itself ; pt states that she does take a combination med with hctz and tolerates that fine.spasms in legs, neck and back    Milk GI Upset    Hydrochlorothiazide Unknown    Statins-Hmg-Coa Reductase Inhibitors Other     Elevated LFTs        Surgical Hx:   Past Surgical History:   Procedure Laterality Date    OTHER SURGICAL HISTORY  12/26/2019    Decubitus ulcer debridement      Social HX:   Social History     Tobacco Use    Smoking status: Never     Passive exposure: Never    Smokeless tobacco: Never   Substance Use Topics    Alcohol use: Not Currently    Drug use: Never      Meds:   Current Outpatient Medications   Medication Instructions    albuterol 90 mcg/actuation inhaler 1 puff, inhalation, Every 6 hours PRN    amLODIPine (NORVASC) 10 mg, oral, Daily    atorvastatin (LIPITOR) 80 mg, oral, Nightly    carvedilol (COREG) 25 mg, oral, 2 times  daily    cyanocobalamin (VITAMIN B-12) 1,000 mcg, Every 30 days    cyclobenzaprine (Flexeril) 5 mg tablet TAKE 1 TABLET BY MOUTH ONE TO TWO TIMES DAILY AS NEEDED    diphenhydrAMINE (Benadryl Allergy) 25 mg tablet Every 4 hours RT    Eliquis 5 mg, oral, Every 12 hours    fluticasone (Flonase) 50 mcg/actuation nasal spray 1 spray, Each Nostril, Daily    hydrALAZINE (APRESOLINE) 10 mg, oral, 2 times daily    LACTULOSE ORAL Take by mouth.    levothyroxine (SYNTHROID, LEVOXYL) 25 mcg, oral, Daily before breakfast    lisinopril 40 mg, oral, Daily    montelukast (SINGULAIR) 10 mg, oral, Nightly    NIFEdipine ER (ADALAT CC) 30 mg, oral, Daily    omeprazole (PRILOSEC) 40 mg, oral, Daily before breakfast        Objective    Objective      Visit Vitals  /77 (BP Location: Right arm, Patient Position: Sitting) Comment: 2nd reading-144/76   Pulse 76      BMI Readings from Last 15 Encounters:   03/11/25 27.84 kg/m²   11/19/24 26.26 kg/m²   10/23/24 25.90 kg/m²   08/02/24 26.78 kg/m²   04/19/24 27.46 kg/m²   02/27/24 26.61 kg/m²   01/24/24 24.89 kg/m²   01/17/24 24.89 kg/m²   01/10/24 25.23 kg/m²   01/06/24 28.06 kg/m²   12/30/23 27.96 kg/m²   06/21/22 28.48 kg/m²   05/06/21 33.91 kg/m²   09/21/20 31.62 kg/m²   09/16/20 26.78 kg/m²      Lab Results   Component Value Date    HGBA1C 5.5 11/19/2024      Lab Results   Component Value Date    HGBA1C 5.5 11/19/2024    HGBA1C 5.8 (H) 04/22/2024    HGBA1C 4.8 12/30/2023     Lab Results   Component Value Date    LDLCALC 74 08/02/2024    CREATININE 1.05 11/19/2024      Lab Results   Component Value Date    WBC 7.1 04/22/2024    HGB 11.3 (L) 04/22/2024    HCT 37.7 04/22/2024    MCV 90 04/22/2024     04/22/2024        Chemistry    Lab Results   Component Value Date/Time     11/19/2024 1024    K 4.2 11/19/2024 1024     (H) 11/19/2024 1024    CO2 26 11/19/2024 1024    BUN 25 (H) 11/19/2024 1024    CREATININE 1.05 11/19/2024 1024    Lab Results   Component Value Date/Time     CALCIUM 9.0 11/19/2024 1024    ALKPHOS 163 (H) 11/19/2024 1024    AST 39 11/19/2024 1024    ALT 52 (H) 11/19/2024 1024    BILITOT 0.5 11/19/2024 1024           Physical Exam  Physical Exam  Constitutional:       Appearance: Normal appearance.   HENT:      Head: Normocephalic and atraumatic.      Mouth/Throat:      Mouth: Mucous membranes are moist.   Cardiovascular:      Rate and Rhythm: Normal rate and regular rhythm.      Heart sounds: No murmur heard.     No friction rub. No gallop.   Pulmonary:      Breath sounds: Normal breath sounds. No stridor. No wheezing or rhonchi.   Abdominal:      General: Abdomen is flat. Bowel sounds are normal.      Palpations: Abdomen is soft. There is no mass.      Tenderness: There is no abdominal tenderness. There is no guarding.   Musculoskeletal:         General: Swelling present. No tenderness. Normal range of motion.      Cervical back: Normal range of motion.      Right lower leg: Edema present.      Left lower leg: Edema present.   Neurological:      General: No focal deficit present.      Mental Status: She is alert and oriented to person, place, and time.   Psychiatric:         Mood and Affect: Mood normal.         Behavior: Behavior normal.          Assessment    Assessment and Plan     The following medical issues were discussed during this encounter  : Zo was seen today for follow-up.    Diagnoses and all orders for this visit:    # Primary hypertension  -     Blood pressure seem to be elevated at this visit.  Blood pressure was 149/77 mmHg.  Repeat at 140/76 mmHg.  Taken by the examiner blood pressure 150/70 mmHg  -     Current regimen for antihypertensive medications amlodipine 10 mg, carvedilol 25 mg twice a day, hydralazine 10 mg twice a day and lisinopril 40 mg every day   -     Comprehensive metabolic panel  -     Magnesium  -     Phosphorus  -     CBC and Auto Differential  -     Albumin-Creatinine Ratio, Urine Random  -  Recommended that she start taking  blood pressure twice a day and bring the reads at next visit    # Injury of kidney, unspecified laterality, subsequent encounter  -     Patient was taken off hydrochlorothiazide-spironolactone for the NICHOLE.  Currently CMP shows stable BUN/creatinine at 25/1.05.  - Given that patient continues to have bilateral lower extremity edema, will be getting new labs.  Plan is to restart patient on low-dose diuretic therapy with Jardiance 10 mg to help address the prediabetes as well as edema  -     Magnesium; Future  -     Phosphorus; Future  -     CBC and Auto Differential; Future  -     Comprehensive metabolic panel    # Transaminitis  -     Comprehensive metabolic panel from 11/19/2024 shows alk phos elevated at 163.  AST normal at 39, ALT elevated at 52, T. bili at 0.5  -     Patient continues to remain off atorvastatin 80 mg  -     Comprehensive metabolic panel    # Vitamin D deficiency  -     Reviewed vitamin D from 05/08/2024: 37   -     Ordered Vitamin D 25-Hydroxy,Total (for eval of Vitamin D levels)    # Vitamin B12 deficiency  -     Patient gets vitamin B12 shots every so often  -     Ordered Vitamin B12; Future    # Hyperlipidemia, unspecified hyperlipidemia type  -     Reviewed lipid panel from 08/02/2024: Cholesterol 146, HDL 58, LDL 74, VLDL 14, TGs 72.   -     Lipid panel; Future  -     Patient was previously on statin therapy with atorvastatin 80 mg nightly which was stopped secondary to transaminitis  -     Will order the lipid panel again, plan is to start the patient on low-dose of rosuvastatin given the history of CVA    # Cerebrovascular accident (CVA), unspecified mechanism (Multi)  -    Recently saw neurology who recommended that patient continue to not drive  - Ordered referral to Speech Therapy; Future    # Prediabetes  -     Reviewed HbA1c from 11/19/2024: 5.5 (WNL)  -  Hemoglobin A1c; Future    # Screening mammogram for breast cancer  -     Ordered BI mammo bilateral screening  [facility]  The following screening tests were ordered:  -     Comprehensive metabolic panel; Future  -     Tsh With Reflex To Free T4 If Abnormal; Future  -     CBC and Auto Differential; Future  -     Vitamin D 25-Hydroxy,Total (for eval of Vitamin D levels); Future  -     Lipid panel  -     Magnesium  -     Phosphorus  -     Albumin-Creatinine Ratio, Urine Random; Future  -     Hemoglobin A1c  Mammogram was ordered for the patient on 04/19/2024. Repeat ordered today  Reviewed DEXA bone scan from 04/24/2024: Normal.  Repeat due in 2 years I.e 04/2026  Patient still has not gotten her Cologuard done    Immunizations:   UTD     Please return on May 6 2:20 PM or if symptoms worsen     Shabbir Rose MD  Internal Medicine, PGY- 2  03/11/25 at 5:22 PM

## 2025-03-12 ENCOUNTER — APPOINTMENT (OUTPATIENT)
Dept: PRIMARY CARE | Facility: CLINIC | Age: 70
End: 2025-03-12
Payer: COMMERCIAL

## 2025-03-12 LAB
25(OH)D3+25(OH)D2 SERPL-MCNC: 19 NG/ML (ref 30–100)
ALBUMIN SERPL-MCNC: 4 G/DL (ref 3.6–5.1)
ALBUMIN/CREAT UR: 10 MG/G CREAT
ALP SERPL-CCNC: 150 U/L (ref 37–153)
ALT SERPL-CCNC: 56 U/L (ref 6–29)
ANION GAP SERPL CALCULATED.4IONS-SCNC: 8 MMOL/L (CALC) (ref 7–17)
AST SERPL-CCNC: 50 U/L (ref 10–35)
BASOPHILS # BLD AUTO: 22 CELLS/UL (ref 0–200)
BASOPHILS NFR BLD AUTO: 0.4 %
BILIRUB SERPL-MCNC: 0.4 MG/DL (ref 0.2–1.2)
BUN SERPL-MCNC: 25 MG/DL (ref 7–25)
CALCIUM SERPL-MCNC: 8.8 MG/DL (ref 8.6–10.4)
CHLORIDE SERPL-SCNC: 112 MMOL/L (ref 98–110)
CHOLEST SERPL-MCNC: 164 MG/DL
CHOLEST/HDLC SERPL: 2.3 (CALC)
CO2 SERPL-SCNC: 24 MMOL/L (ref 20–32)
CREAT SERPL-MCNC: 1.1 MG/DL (ref 0.6–1)
CREAT UR-MCNC: 145 MG/DL (ref 20–275)
EGFRCR SERPLBLD CKD-EPI 2021: 54 ML/MIN/1.73M2
EOSINOPHIL # BLD AUTO: 168 CELLS/UL (ref 15–500)
EOSINOPHIL NFR BLD AUTO: 3 %
ERYTHROCYTE [DISTWIDTH] IN BLOOD BY AUTOMATED COUNT: 13.6 % (ref 11–15)
GLUCOSE SERPL-MCNC: 96 MG/DL (ref 65–139)
HBA1C MFR BLD: 5.6 % OF TOTAL HGB
HCT VFR BLD AUTO: 36 % (ref 35–45)
HDLC SERPL-MCNC: 70 MG/DL
HGB BLD-MCNC: 11.1 G/DL (ref 11.7–15.5)
LDLC SERPL CALC-MCNC: 76 MG/DL (CALC)
LYMPHOCYTES # BLD AUTO: 1786 CELLS/UL (ref 850–3900)
LYMPHOCYTES NFR BLD AUTO: 31.9 %
MAGNESIUM SERPL-MCNC: 2.1 MG/DL (ref 1.5–2.5)
MCH RBC QN AUTO: 27.3 PG (ref 27–33)
MCHC RBC AUTO-ENTMCNC: 30.8 G/DL (ref 32–36)
MCV RBC AUTO: 88.7 FL (ref 80–100)
MICROALBUMIN UR-MCNC: 1.4 MG/DL
MONOCYTES # BLD AUTO: 414 CELLS/UL (ref 200–950)
MONOCYTES NFR BLD AUTO: 7.4 %
NEUTROPHILS # BLD AUTO: 3209 CELLS/UL (ref 1500–7800)
NEUTROPHILS NFR BLD AUTO: 57.3 %
NONHDLC SERPL-MCNC: 94 MG/DL (CALC)
PHOSPHATE SERPL-MCNC: 4.2 MG/DL (ref 2.1–4.3)
PLATELET # BLD AUTO: 214 THOUSAND/UL (ref 140–400)
PMV BLD REES-ECKER: 12.1 FL (ref 7.5–12.5)
POTASSIUM SERPL-SCNC: 4.6 MMOL/L (ref 3.5–5.3)
PROT SERPL-MCNC: 6.4 G/DL (ref 6.1–8.1)
RBC # BLD AUTO: 4.06 MILLION/UL (ref 3.8–5.1)
SODIUM SERPL-SCNC: 144 MMOL/L (ref 135–146)
TRIGL SERPL-MCNC: 92 MG/DL
TSH SERPL-ACNC: 2.41 MIU/L (ref 0.4–4.5)
VIT B12 SERPL-MCNC: 558 PG/ML (ref 200–1100)
WBC # BLD AUTO: 5.6 THOUSAND/UL (ref 3.8–10.8)

## 2025-03-13 DIAGNOSIS — N18.31 STAGE 3A CHRONIC KIDNEY DISEASE (MULTI): ICD-10-CM

## 2025-03-13 DIAGNOSIS — E55.9 VITAMIN D DEFICIENCY: Primary | ICD-10-CM

## 2025-03-13 RX ORDER — ERGOCALCIFEROL 1.25 MG/1
50000 CAPSULE ORAL WEEKLY
Qty: 12 CAPSULE | Refills: 0 | Status: SHIPPED | OUTPATIENT
Start: 2025-03-13 | End: 2025-06-05

## 2025-04-10 DIAGNOSIS — I10 ESSENTIAL HYPERTENSION: ICD-10-CM

## 2025-04-10 RX ORDER — LISINOPRIL 40 MG/1
40 TABLET ORAL DAILY
Qty: 90 TABLET | Refills: 0 | Status: SHIPPED | OUTPATIENT
Start: 2025-04-10

## 2025-04-14 ENCOUNTER — TELEPHONE (OUTPATIENT)
Dept: PRIMARY CARE | Facility: CLINIC | Age: 70
End: 2025-04-14
Payer: COMMERCIAL

## 2025-05-06 ENCOUNTER — APPOINTMENT (OUTPATIENT)
Dept: PRIMARY CARE | Facility: CLINIC | Age: 70
End: 2025-05-06
Payer: COMMERCIAL
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
## 2025-05-06 PROCEDURE — 99213 OFFICE O/P EST LOW 20 MIN: CPT | Performed by: INTERNAL MEDICINE
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
## 2025-05-07 ENCOUNTER — TELEPHONE (OUTPATIENT)
Dept: PRIMARY CARE | Facility: CLINIC | Age: 70
End: 2025-05-07
Payer: COMMERCIAL

## 2025-05-09 DIAGNOSIS — I10 ESSENTIAL HYPERTENSION: ICD-10-CM

## 2025-05-09 RX ORDER — NIFEDIPINE 30 MG/1
30 TABLET, EXTENDED RELEASE ORAL DAILY
Qty: 90 TABLET | Refills: 0 | Status: SHIPPED | OUTPATIENT
Start: 2025-05-09

## 2025-05-12 ENCOUNTER — APPOINTMENT (OUTPATIENT)
Dept: PRIMARY CARE | Facility: CLINIC | Age: 70
End: 2025-05-12
Payer: COMMERCIAL

## 2025-05-13 ENCOUNTER — APPOINTMENT (OUTPATIENT)
Dept: PRIMARY CARE | Facility: CLINIC | Age: 70
End: 2025-05-13
Payer: COMMERCIAL

## 2025-05-14 DIAGNOSIS — I10 ESSENTIAL HYPERTENSION: ICD-10-CM

## 2025-05-14 RX ORDER — AMLODIPINE BESYLATE 10 MG/1
10 TABLET ORAL DAILY
Qty: 90 TABLET | Refills: 1 | Status: SHIPPED | OUTPATIENT
Start: 2025-05-14

## 2025-05-20 DIAGNOSIS — J45.20 MILD INTERMITTENT ASTHMA WITHOUT COMPLICATION (HHS-HCC): ICD-10-CM

## 2025-05-20 RX ORDER — MONTELUKAST SODIUM 10 MG/1
10 TABLET ORAL NIGHTLY
Qty: 90 TABLET | Refills: 0 | Status: SHIPPED | OUTPATIENT
Start: 2025-05-20

## 2025-05-27 DIAGNOSIS — M19.90 ARTHRITIS: ICD-10-CM

## 2025-05-27 DIAGNOSIS — I63.9 CEREBROVASCULAR ACCIDENT (CVA), UNSPECIFIED MECHANISM (MULTI): ICD-10-CM

## 2025-06-02 DIAGNOSIS — E55.9 VITAMIN D DEFICIENCY: ICD-10-CM

## 2025-06-03 RX ORDER — ERGOCALCIFEROL 1.25 MG/1
50000 CAPSULE ORAL
Qty: 12 CAPSULE | Refills: 0 | Status: SHIPPED | OUTPATIENT
Start: 2025-06-08

## 2025-06-11 DIAGNOSIS — I63.9 CEREBROVASCULAR ACCIDENT (CVA), UNSPECIFIED MECHANISM (MULTI): ICD-10-CM

## 2025-06-11 RX ORDER — APIXABAN 5 MG/1
5 TABLET, FILM COATED ORAL EVERY 12 HOURS
Qty: 180 TABLET | Refills: 0 | Status: SHIPPED | OUTPATIENT
Start: 2025-06-11

## 2025-06-27 DIAGNOSIS — I10 ESSENTIAL HYPERTENSION: ICD-10-CM

## 2025-06-28 RX ORDER — CARVEDILOL 25 MG/1
25 TABLET ORAL 2 TIMES DAILY
Qty: 180 TABLET | Refills: 1 | Status: SHIPPED | OUTPATIENT
Start: 2025-06-28

## 2025-07-11 DIAGNOSIS — I10 ESSENTIAL HYPERTENSION: ICD-10-CM

## 2025-07-11 RX ORDER — LISINOPRIL 40 MG/1
40 TABLET ORAL DAILY
Qty: 90 TABLET | Refills: 0 | Status: SHIPPED | OUTPATIENT
Start: 2025-07-11

## 2025-07-18 ENCOUNTER — APPOINTMENT (OUTPATIENT)
Dept: NEUROLOGY | Facility: CLINIC | Age: 70
End: 2025-07-18
Payer: COMMERCIAL

## 2025-08-06 DIAGNOSIS — I10 ESSENTIAL HYPERTENSION: ICD-10-CM

## 2025-08-06 RX ORDER — NIFEDIPINE 30 MG/1
30 TABLET, EXTENDED RELEASE ORAL DAILY
Qty: 90 TABLET | Refills: 0 | Status: SHIPPED | OUTPATIENT
Start: 2025-08-06

## 2025-08-17 DIAGNOSIS — J45.20 MILD INTERMITTENT ASTHMA WITHOUT COMPLICATION (HHS-HCC): ICD-10-CM

## 2025-08-17 RX ORDER — MONTELUKAST SODIUM 10 MG/1
10 TABLET ORAL NIGHTLY
Qty: 90 TABLET | Refills: 0 | Status: SHIPPED | OUTPATIENT
Start: 2025-08-17

## 2025-08-27 DIAGNOSIS — I10 ESSENTIAL HYPERTENSION: ICD-10-CM

## 2025-08-27 DIAGNOSIS — E03.9 HYPOTHYROIDISM, UNSPECIFIED TYPE: ICD-10-CM

## 2025-08-27 DIAGNOSIS — K21.00 GASTROESOPHAGEAL REFLUX DISEASE WITH ESOPHAGITIS WITHOUT HEMORRHAGE: ICD-10-CM

## 2025-08-27 RX ORDER — LEVOTHYROXINE SODIUM 25 UG/1
25 TABLET ORAL
Qty: 90 TABLET | Refills: 1 | Status: SHIPPED | OUTPATIENT
Start: 2025-08-27 | End: 2026-02-23

## 2025-08-27 RX ORDER — HYDRALAZINE HYDROCHLORIDE 10 MG/1
10 TABLET, FILM COATED ORAL 2 TIMES DAILY
Qty: 180 TABLET | Refills: 1 | Status: SHIPPED | OUTPATIENT
Start: 2025-08-27 | End: 2025-08-29 | Stop reason: SDUPTHER

## 2025-08-27 RX ORDER — OMEPRAZOLE 40 MG/1
40 CAPSULE, DELAYED RELEASE ORAL
Qty: 90 CAPSULE | Refills: 1 | Status: SHIPPED | OUTPATIENT
Start: 2025-08-27 | End: 2026-02-23

## 2025-08-29 ENCOUNTER — APPOINTMENT (OUTPATIENT)
Dept: PRIMARY CARE | Facility: CLINIC | Age: 70
End: 2025-08-29
Payer: COMMERCIAL

## 2025-08-29 VITALS — SYSTOLIC BLOOD PRESSURE: 178 MMHG | DIASTOLIC BLOOD PRESSURE: 80 MMHG | HEART RATE: 77 BPM

## 2025-08-29 DIAGNOSIS — I10 ESSENTIAL HYPERTENSION: ICD-10-CM

## 2025-08-29 DIAGNOSIS — I63.9 CEREBRAL INFARCTION, UNSPECIFIED MECHANISM (MULTI): ICD-10-CM

## 2025-08-29 DIAGNOSIS — I69.351 HEMIPLEGIA AND HEMIPARESIS FOLLOWING CEREBRAL INFARCTION AFFECTING RIGHT DOMINANT SIDE (MULTI): Primary | ICD-10-CM

## 2025-08-29 PROCEDURE — 1036F TOBACCO NON-USER: CPT | Performed by: INTERNAL MEDICINE

## 2025-08-29 PROCEDURE — 3077F SYST BP >= 140 MM HG: CPT | Performed by: INTERNAL MEDICINE

## 2025-08-29 PROCEDURE — 1159F MED LIST DOCD IN RCRD: CPT | Performed by: INTERNAL MEDICINE

## 2025-08-29 PROCEDURE — 3079F DIAST BP 80-89 MM HG: CPT | Performed by: INTERNAL MEDICINE

## 2025-08-29 PROCEDURE — 99214 OFFICE O/P EST MOD 30 MIN: CPT | Performed by: INTERNAL MEDICINE

## 2025-08-29 RX ORDER — METHOCARBAMOL 750 MG/1
750 TABLET, FILM COATED ORAL 3 TIMES DAILY PRN
COMMUNITY
Start: 2025-05-10 | End: 2025-08-29 | Stop reason: SDUPTHER

## 2025-08-29 RX ORDER — ACETAMINOPHEN 500 MG
TABLET ORAL
Qty: 1 KIT | Refills: 0 | Status: SHIPPED | OUTPATIENT
Start: 2025-08-29

## 2025-08-29 RX ORDER — AMLODIPINE BESYLATE 5 MG/1
5 TABLET ORAL NIGHTLY
Qty: 90 TABLET | Refills: 1 | Status: SHIPPED | OUTPATIENT
Start: 2025-08-29 | End: 2026-02-25

## 2025-08-29 RX ORDER — HYDRALAZINE HYDROCHLORIDE 10 MG/1
10 TABLET, FILM COATED ORAL 2 TIMES DAILY PRN
Qty: 180 TABLET | Refills: 1 | Status: SHIPPED | OUTPATIENT
Start: 2025-08-29

## 2025-08-29 RX ORDER — METHOCARBAMOL 500 MG/1
750 TABLET, FILM COATED ORAL 3 TIMES DAILY PRN
Qty: 90 TABLET | Refills: 1 | Status: SHIPPED | OUTPATIENT
Start: 2025-08-29 | End: 2025-08-29

## 2025-08-29 RX ORDER — AMLODIPINE BESYLATE 10 MG/1
10 TABLET ORAL DAILY
Qty: 90 TABLET | Refills: 1 | Status: SHIPPED | OUTPATIENT
Start: 2025-08-29 | End: 2025-08-29 | Stop reason: SINTOL

## 2025-08-29 RX ORDER — TELMISARTAN 80 MG/1
80 TABLET ORAL DAILY
Qty: 90 TABLET | Refills: 1 | Status: SHIPPED | OUTPATIENT
Start: 2025-08-29 | End: 2026-08-29

## 2025-08-29 RX ORDER — LISINOPRIL 40 MG/1
40 TABLET ORAL DAILY
Qty: 90 TABLET | Refills: 1 | Status: SHIPPED | OUTPATIENT
Start: 2025-08-29 | End: 2025-08-29 | Stop reason: WASHOUT

## 2025-08-29 RX ORDER — METHOCARBAMOL 500 MG/1
500 TABLET, FILM COATED ORAL DAILY PRN
Qty: 90 TABLET | Refills: 1 | Status: SHIPPED | OUTPATIENT
Start: 2025-08-29

## 2025-08-29 ASSESSMENT — PATIENT HEALTH QUESTIONNAIRE - PHQ9
SUM OF ALL RESPONSES TO PHQ9 QUESTIONS 1 AND 2: 0
1. LITTLE INTEREST OR PLEASURE IN DOING THINGS: NOT AT ALL
2. FEELING DOWN, DEPRESSED OR HOPELESS: NOT AT ALL

## 2025-08-29 ASSESSMENT — LIFESTYLE VARIABLES
HOW OFTEN DO YOU HAVE A DRINK CONTAINING ALCOHOL: NEVER
AUDIT-C TOTAL SCORE: 0
HOW MANY STANDARD DRINKS CONTAINING ALCOHOL DO YOU HAVE ON A TYPICAL DAY: PATIENT DOES NOT DRINK
SKIP TO QUESTIONS 9-10: 1
HOW OFTEN DO YOU HAVE SIX OR MORE DRINKS ON ONE OCCASION: NEVER

## 2025-09-03 DIAGNOSIS — E55.9 VITAMIN D DEFICIENCY: ICD-10-CM

## 2025-09-03 RX ORDER — ERGOCALCIFEROL 1.25 MG/1
1.25 CAPSULE ORAL
Qty: 12 CAPSULE | Refills: 0 | Status: SHIPPED | OUTPATIENT
Start: 2025-09-03

## 2025-09-06 DIAGNOSIS — N18.31 STAGE 3A CHRONIC KIDNEY DISEASE (MULTI): ICD-10-CM

## 2025-09-06 RX ORDER — EMPAGLIFLOZIN 10 MG/1
10 TABLET, FILM COATED ORAL DAILY
Qty: 90 TABLET | Refills: 1 | Status: SHIPPED | OUTPATIENT
Start: 2025-09-06

## 2025-10-15 ENCOUNTER — APPOINTMENT (OUTPATIENT)
Dept: PRIMARY CARE | Facility: CLINIC | Age: 70
End: 2025-10-15
Payer: COMMERCIAL